# Patient Record
Sex: FEMALE | Race: WHITE | NOT HISPANIC OR LATINO | Employment: OTHER | URBAN - METROPOLITAN AREA
[De-identification: names, ages, dates, MRNs, and addresses within clinical notes are randomized per-mention and may not be internally consistent; named-entity substitution may affect disease eponyms.]

---

## 2018-05-07 ENCOUNTER — HOSPITAL ENCOUNTER (EMERGENCY)
Facility: HOSPITAL | Age: 63
Discharge: HOME | End: 2018-05-07
Attending: EMERGENCY MEDICINE | Admitting: EMERGENCY MEDICINE
Payer: COMMERCIAL

## 2018-05-07 VITALS
RESPIRATION RATE: 18 BRPM | HEART RATE: 88 BPM | DIASTOLIC BLOOD PRESSURE: 83 MMHG | TEMPERATURE: 98.7 F | SYSTOLIC BLOOD PRESSURE: 166 MMHG | WEIGHT: 145 LBS | OXYGEN SATURATION: 98 %
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
## 2018-05-07 DIAGNOSIS — L03.119 CELLULITIS OF WRIST: ICD-10-CM

## 2018-05-07 DIAGNOSIS — S61.551A CAT BITE OF RIGHT WRIST, INITIAL ENCOUNTER: Primary | ICD-10-CM

## 2018-05-07 LAB
ANION GAP SERPL CALCULATED.3IONS-SCNC: 9 MMOL/L (ref 4–13)
BASOPHILS # BLD AUTO: 0 THOUSANDS/ΜL (ref 0–0.1)
BASOPHILS NFR BLD AUTO: 1 % (ref 0–1)
BUN SERPL-MCNC: 15 MG/DL (ref 5–25)
CALCIUM SERPL-MCNC: 9.1 MG/DL (ref 8.3–10.1)
CHLORIDE SERPL-SCNC: 103 MMOL/L (ref 100–108)
CO2 SERPL-SCNC: 28 MMOL/L (ref 21–32)
CREAT SERPL-MCNC: 0.8 MG/DL (ref 0.6–1.3)
EOSINOPHIL # BLD AUTO: 0.2 THOUSAND/ΜL (ref 0–0.61)
EOSINOPHIL NFR BLD AUTO: 3 % (ref 0–6)
ERYTHROCYTE [DISTWIDTH] IN BLOOD BY AUTOMATED COUNT: 13 % (ref 11.6–15.1)
GFR SERPL CREATININE-BSD FRML MDRD: 79 ML/MIN/1.73SQ M
GLUCOSE SERPL-MCNC: 112 MG/DL (ref 65–140)
HCT VFR BLD AUTO: 41.6 % (ref 37–47)
HGB BLD-MCNC: 14.2 G/DL (ref 12–16)
LYMPHOCYTES # BLD AUTO: 1.3 THOUSANDS/ΜL (ref 0.6–4.47)
LYMPHOCYTES NFR BLD AUTO: 22 % (ref 14–44)
MCH RBC QN AUTO: 31 PG (ref 27–31)
MCHC RBC AUTO-ENTMCNC: 34 G/DL (ref 31.4–37.4)
MCV RBC AUTO: 91 FL (ref 82–98)
MONOCYTES # BLD AUTO: 0.3 THOUSAND/ΜL (ref 0.17–1.22)
MONOCYTES NFR BLD AUTO: 5 % (ref 4–12)
NEUTROPHILS # BLD AUTO: 4 THOUSANDS/ΜL (ref 1.85–7.62)
NEUTS SEG NFR BLD AUTO: 70 % (ref 43–75)
NRBC BLD AUTO-RTO: 0 /100 WBCS
PLATELET # BLD AUTO: 221 THOUSANDS/UL (ref 130–400)
PMV BLD AUTO: 7.4 FL (ref 8.9–12.7)
POTASSIUM SERPL-SCNC: 4.1 MMOL/L (ref 3.5–5.3)
RBC # BLD AUTO: 4.57 MILLION/UL (ref 4.2–5.4)
SODIUM SERPL-SCNC: 140 MMOL/L (ref 136–145)
WBC # BLD AUTO: 5.8 THOUSAND/UL (ref 4.8–10.8)

## 2018-05-07 PROCEDURE — 85025 COMPLETE CBC W/AUTO DIFF WBC: CPT | Performed by: PHYSICIAN ASSISTANT

## 2018-05-07 PROCEDURE — 96375 TX/PRO/DX INJ NEW DRUG ADDON: CPT

## 2018-05-07 PROCEDURE — 87077 CULTURE AEROBIC IDENTIFY: CPT | Performed by: PHYSICIAN ASSISTANT

## 2018-05-07 PROCEDURE — 87205 SMEAR GRAM STAIN: CPT | Performed by: PHYSICIAN ASSISTANT

## 2018-05-07 PROCEDURE — 36415 COLL VENOUS BLD VENIPUNCTURE: CPT | Performed by: PHYSICIAN ASSISTANT

## 2018-05-07 PROCEDURE — 96365 THER/PROPH/DIAG IV INF INIT: CPT

## 2018-05-07 PROCEDURE — 99283 EMERGENCY DEPT VISIT LOW MDM: CPT

## 2018-05-07 PROCEDURE — 80048 BASIC METABOLIC PNL TOTAL CA: CPT | Performed by: PHYSICIAN ASSISTANT

## 2018-05-07 PROCEDURE — 87070 CULTURE OTHR SPECIMN AEROBIC: CPT | Performed by: PHYSICIAN ASSISTANT

## 2018-05-07 PROCEDURE — 87040 BLOOD CULTURE FOR BACTERIA: CPT | Performed by: PHYSICIAN ASSISTANT

## 2018-05-07 RX ORDER — KETOROLAC TROMETHAMINE 30 MG/ML
15 INJECTION, SOLUTION INTRAMUSCULAR; INTRAVENOUS ONCE
Status: COMPLETED | OUTPATIENT
Start: 2018-05-07 | End: 2018-05-07

## 2018-05-07 RX ORDER — NAPROXEN 500 MG/1
500 TABLET ORAL 2 TIMES DAILY WITH MEALS
Qty: 20 TABLET | Refills: 0 | Status: SHIPPED | OUTPATIENT
Start: 2018-05-07 | End: 2018-07-25

## 2018-05-07 RX ORDER — AMOXICILLIN AND CLAVULANATE POTASSIUM 875; 125 MG/1; MG/1
1 TABLET, FILM COATED ORAL EVERY 12 HOURS SCHEDULED
Qty: 20 TABLET | Refills: 0 | Status: SHIPPED | OUTPATIENT
Start: 2018-05-07 | End: 2018-05-17

## 2018-05-07 RX ADMIN — SODIUM CHLORIDE 3 G: 9 INJECTION, SOLUTION INTRAVENOUS at 09:51

## 2018-05-07 RX ADMIN — KETOROLAC TROMETHAMINE 15 MG: 30 INJECTION, SOLUTION INTRAMUSCULAR at 10:42

## 2018-05-07 NOTE — ED PROVIDER NOTES
History  Chief Complaint   Patient presents with    Cat Bite     was bit by her cat yesterday  right wrist is reddened , warm and painful     80-year-old female presenting today with a cat bite that occurred last evening and woke this morning with some redness  States that her own cat bit her on her right wrist, she noticed this 2 puncture wounds  States that she thoroughly cleaned out the wound with peroxide  Has had this issue before in the past for she needed antibiotics  Last tetanus vaccination was within 3 years ago  States that she has her own rabies vaccinations and the cat is up-to-date on vaccinations  States that she takes care lesly cats on a regular basis  Notes redness and warmth to the back of her wrist  Took a dose of her cats 500 mg of amoxicillin medication  Denies numbness, paresthesias, fevers  None       Past Medical History:   Diagnosis Date    Hypertension        Past Surgical History:   Procedure Laterality Date    BREAST LUMPECTOMY Right     TONSILLECTOMY         History reviewed  No pertinent family history  I have reviewed and agree with the history as documented  Social History   Substance Use Topics    Smoking status: Never Smoker    Smokeless tobacco: Never Used    Alcohol use 1 8 oz/week     3 Cans of beer per week        Review of Systems   Constitutional: Negative  Negative for activity change and appetite change  HENT: Negative  Eyes: Negative  Respiratory: Negative  Cardiovascular: Negative  Gastrointestinal: Negative  Genitourinary: Negative  Musculoskeletal: Negative  Skin: Positive for color change and wound  Negative for pallor and rash  Neurological: Negative  All other systems reviewed and are negative        Physical Exam  ED Triage Vitals [05/07/18 0905]   Temperature Pulse Respirations Blood Pressure SpO2   98 7 °F (37 1 °C) 88 18 166/83 98 %      Temp src Heart Rate Source Patient Position - Orthostatic VS BP Location FiO2 (%)   -- -- -- -- --      Pain Score       6           Orthostatic Vital Signs  Vitals:    05/07/18 0905   BP: 166/83   Pulse: 88       Physical Exam   Constitutional: She is oriented to person, place, and time  She appears well-developed and well-nourished  HENT:   Head: Normocephalic and atraumatic  Eyes: Conjunctivae and EOM are normal  Pupils are equal, round, and reactive to light  Neck: Normal range of motion  Neck supple  Cardiovascular: Normal rate  Pulmonary/Chest: Effort normal    spo2 is 98% indicating adequate oxygenation    Neurological: She is alert and oriented to person, place, and time  Skin: Skin is warm and dry  Capillary refill takes less than 2 seconds  Rash noted  There is erythema  Nursing note and vitals reviewed  ED Medications  Medications   ketorolac (TORADOL) injection 15 mg (not administered)   ampicillin-sulbactam (UNASYN) 3 g in sodium chloride 0 9 % 100 mL IVPB (3 g Intravenous New Bag 5/7/18 0951)       Diagnostic Studies  Results Reviewed     Procedure Component Value Units Date/Time    Basic metabolic panel [[id number]] Collected:  05/07/18 2495    Lab Status:  Final result Specimen:  Blood from Arm, Left Updated:  05/07/18 1001     Sodium 140 mmol/L      Potassium 4 1 mmol/L      Chloride 103 mmol/L      CO2 28 mmol/L      Anion Gap 9 mmol/L      BUN 15 mg/dL      Creatinine 0 80 mg/dL      Glucose 112 mg/dL      Calcium 9 1 mg/dL      eGFR 79 ml/min/1 73sq m     Narrative:         National Kidney Disease Education Program recommendations are as follows:  GFR calculation is accurate only with a steady state creatinine  Chronic Kidney disease less than 60 ml/min/1 73 sq  meters  Kidney failure less than 15 ml/min/1 73 sq  meters      CBC and differential [[id number]]  (Abnormal) Collected:  05/07/18 0938    Lab Status:  Final result Specimen:  Blood from Arm, Left Updated:  05/07/18 0954     WBC 5 80 Thousand/uL      RBC 4 57 Million/uL Hemoglobin 14 2 g/dL      Hematocrit 41 6 %      MCV 91 fL      MCH 31 0 pg      MCHC 34 0 g/dL      RDW 13 0 %      MPV 7 4 (L) fL      Platelets 326 Thousands/uL      nRBC 0 /100 WBCs      Neutrophils Relative 70 %      Lymphocytes Relative 22 %      Monocytes Relative 5 %      Eosinophils Relative 3 %      Basophils Relative 1 %      Neutrophils Absolute 4 00 Thousands/µL      Lymphocytes Absolute 1 30 Thousands/µL      Monocytes Absolute 0 30 Thousand/µL      Eosinophils Absolute 0 20 Thousand/µL      Basophils Absolute 0 00 Thousands/µL     Blood culture #1 [[id number]] Collected:  05/07/18 0938    Lab Status: In process Specimen:  Blood from Arm, Left Updated:  05/07/18 0949    Blood culture #2 [[id number]] Collected:  05/07/18 0943    Lab Status: In process Specimen:  Blood from Arm, Right Updated:  05/07/18 0949    Wound culture and Gram stain [[id number]] Collected:  05/07/18 6371    Lab Status: In process Specimen:  Wound from Hand, Right Updated:  05/07/18 0948                 No orders to display              Procedures  Procedures       Phone Contacts  ED Phone Contact    ED Course                               MDM  Number of Diagnoses or Management Options  Cat bite of right wrist, initial encounter:   Cellulitis of wrist:   Diagnosis management comments: Encouraged patient to not use her cats medication for personal use and to air out wound  Will prescribe augmentin  No collection noted under the cellulitis however minimal pus expressed  Area demarcated and patient was given very strict return precautions for worsening of symptoms such as fevers, spreading redness, pus like drainage  Patient is informed to return to the emergency department for worsening of symptoms and was given proper education regarding their diagnosis and symptoms  Otherwise the patient is informed to follow up with their primary care doctor for re-evaluation   The patient verbalizes understanding and agrees with above assessment and plan  All questions were answered  Please Note: Fluency Direct voice recognition software may have been used in the creation of this document  Wrong words or sound a like substitutions may have occurred due to the inherent limitations of the voice software  Amount and/or Complexity of Data Reviewed  Clinical lab tests: ordered and reviewed  Review and summarize past medical records: yes  Independent visualization of images, tracings, or specimens: yes      CritCare Time    Disposition  Final diagnoses:   Cat bite of right wrist, initial encounter   Cellulitis of wrist     Time reflects when diagnosis was documented in both MDM as applicable and the Disposition within this note     Time User Action Codes Description Comment    5/7/2018 10:25 AM Vicky Padron [U07 731L,  W55 [id number]] Cat bite of right wrist, initial encounter     5/7/2018 10:25 AM Vicky Padron [[id number]] Cellulitis of wrist       ED Disposition     ED Disposition Condition Comment    Discharge  Bob Ache discharge to home/self care      Condition at discharge: Good        Follow-up Information     Follow up With Specialties Details Why Contact Info Additional [address] Emergency Department Emergency Medicine Go to If symptoms worsen such as spreading redness, swelling, fevers, severe pain etc  [address] [place] [address]  [phone] [facility], [place], [place], [address]    Judd Ramires MD Family Medicine Schedule an appointment as soon as possible for a visit As needed [address]  [id number] [facility] [address] [phone]           Patient's Medications   Discharge Prescriptions    AMOXICILLIN-CLAVULANATE (AUGMENTIN) 875-125 MG PER TABLET    Take 1 tablet by mouth every 12 (twelve) hours for 10 days       Start Date: 5/7/2018  End Date: 5/17/2018       Order Dose: 1 tablet       Quantity: 20 tablet    Refills: 0    NAPROXEN (NAPROSYN) 500 MG TABLET    Take 1 tablet (500 mg total) by mouth 2 (two) times a day with meals for 10 days       Start Date: 5/7/2018  End Date: 5/17/2018       Order Dose: 500 mg       Quantity: 20 tablet    Refills: 0     No discharge procedures on file      ED Provider  Electronically Signed by           Ke Adrian PA-C  05/07/18 1026

## 2018-05-07 NOTE — DISCHARGE INSTRUCTIONS
Animal Bite   WHAT YOU NEED TO KNOW:   Animal bite injuries range from shallow cuts to deep, life-threatening wounds  An animal can cut or puncture the skin when it bites  Your skin may be torn from your body  Your skin may swell or bruise even if the bite does not break the skin  Animal bites occur more often on the hands, arms, legs, and face  Bites from dogs and cats are the most common injuries  DISCHARGE INSTRUCTIONS:   Return to the emergency department if:   · You have a fever  · Your wound is red, swollen, and draining pus  · You see red streaks on the skin around the wound  · You can no longer move the bitten area  · Your heartbeat and breathing are much faster than usual     · You feel dizzy and confused  Contact your healthcare provider if:   · Your pain does not get better, even after you take pain medicine  · You have nightmares or flashbacks about the animal bite  · You have questions or concerns about your condition or care  Medicines: You may need any of the following:  · Antibiotics  prevent or treat a bacterial infection  · Prescription pain medicine  may be given  Ask how to take this medicine safely  · A tetanus vaccine  may be needed to prevent tetanus  Tetanus is a life-threatening bacterial infection that affects the nerves and muscles  The bacteria can be spread through animal bites  · A rabies vaccine  may be needed to prevent rabies  Rabies is a life-threatening viral infection  The virus can be spread through animal bites  · Take your medicine as directed  Contact your healthcare provider if you think your medicine is not helping or if you have side effects  Tell him of her if you are allergic to any medicine  Keep a list of the medicines, vitamins, and herbs you take  Include the amounts, and when and why you take them  Bring the list or the pill bottles to follow-up visits  Carry your medicine list with you in case of an emergency    Follow up with your healthcare provider in 1 to 2 days: You may need to return to have your stitches removed  Write down your questions so you remember to ask them during your visits  Self-care:   · Apply antibiotic ointment as directed  This helps prevent infection in minor skin wounds  It is available without a doctor's order  · Keep the wound clean and covered  Wash the wound every day with soap and water or germ-killing cleanser  Ask your healthcare provider about the kinds of bandages to use  · Apply ice on your wound  Ice helps decrease swelling and pain  Ice may also help prevent tissue damage  Use an ice pack, or put crushed ice in a plastic bag  Cover it with a towel and place it on your wound for 15 to 20 minutes every hour or as directed  · Elevate the wound area  Raise your wound above the level of your heart as often as you can  This will help decrease swelling and pain  Prop your wound on pillows or blankets to keep it elevated comfortably  Prevent another animal bite:   · Learn to recognize the signs of a scared or angry pet  Avoid quick, sudden movements  · Do not step between animals that are fighting  · Do not leave a pet alone with a young child  · Do not disturb an animal while it eats, sleeps, or cares for its young  · Do not approach an animal you do not know, especially one that is tied up or caged  · Stay away from animals that seem sick or act strangely  · Do not feed or capture wild animals  © 2017 [address] Information is for End User's use only and may not be sold, redistributed or otherwise used for commercial purposes  All illustrations and images included in CareNotes® are the copyrighted property of [employer]  or Tremayne Pacheco  The above information is an  only  It is not intended as medical advice for individual conditions or treatments   Talk to your doctor, nurse or pharmacist before following any medical regimen to see if it is safe and effective for you  Cellulitis, Ambulatory Care   GENERAL INFORMATION:   Cellulitis  is a skin infection caused by bacteria  Common symptoms include the following:   · Fever    · A red, warm, swollen area on your skin    · Pain when the area is touched    · Bumps or blisters (abscess) that may drain pus    · Bumpy, raised skin that feels like an orange peel  Seek immediate care for the following symptoms:   · An increase in pain, redness, warmth, and size    · Red streaks coming from the infected area    · A thin, gray-brown discharge coming from your infected skin area    · A crackling under your skin when you touch it    · Purple dots or bumps on your skin, or bleeding under your skin    · New swelling and pain in your legs    · Sudden trouble breathing or chest pain  Treatment for cellulitis  may include medicines to treat the bacterial infection or decrease pain  The infection may need to be cleaned out  Damaged, dead, or infected tissue may need to be cut away to help your wound heal   Manage your symptoms:   · Elevate your wound above the level of your heart  as often as you can  This will help decrease swelling and pain  Prop your wound on pillows or blankets to keep it elevated comfortably  · Clean your wound as directed  You may need to wash the wound with soap and water  Look for signs of infection  · Wear pressure stockings as directed  The stockings are tight and put pressure on your legs  This improves blood flow and decreases swelling  Prevent cellulitis:   · Wash your hands often  Use soap and water  Wash your hands after you use the bathroom, change a child's diapers, or sneeze  Wash your hands before you prepare or eat food  Use lotion to prevent dry, cracked skin  · Do not share personal items, such as towels, clothing, and razors  · Clean exercise equipment  with germ-killing  before and after you use it    Follow up with your healthcare provider as directed:  Write down your questions so you remember to ask them during your visits  CARE AGREEMENT:   You have the right to help plan your care  Learn about your health condition and how it may be treated  Discuss treatment options with your caregivers to decide what care you want to receive  You always have the right to refuse treatment  The above information is an  only  It is not intended as medical advice for individual conditions or treatments  Talk to your doctor, nurse or pharmacist before following any medical regimen to see if it is safe and effective for you  © 2014 [address] is for End User's use only and may not be sold, redistributed or otherwise used for commercial purposes  All illustrations and images included in CareNotes® are the copyrighted property of A D A M [employer]  or Tremayne Pacheco

## 2018-05-12 LAB
BACTERIA BLD CULT: NORMAL
BACTERIA BLD CULT: NORMAL
BACTERIA WND AEROBE CULT: ABNORMAL
BACTERIA WND AEROBE CULT: ABNORMAL
GRAM STN SPEC: ABNORMAL

## 2018-05-22 ENCOUNTER — TRANSCRIBE ORDERS (OUTPATIENT)
Dept: ADMINISTRATIVE | Facility: HOSPITAL | Age: 63
End: 2018-05-22

## 2018-05-22 DIAGNOSIS — Z13.820 SCREENING FOR OSTEOPOROSIS: Primary | ICD-10-CM

## 2018-05-30 ENCOUNTER — HOSPITAL ENCOUNTER (OUTPATIENT)
Dept: RADIOLOGY | Facility: HOSPITAL | Age: 63
Discharge: HOME | End: 2018-05-30
Payer: COMMERCIAL

## 2018-05-30 DIAGNOSIS — Z13.820 SCREENING FOR OSTEOPOROSIS: ICD-10-CM

## 2018-05-30 PROCEDURE — 77080 DXA BONE DENSITY AXIAL: CPT

## 2018-07-25 ENCOUNTER — HOSPITAL ENCOUNTER (EMERGENCY)
Facility: HOSPITAL | Age: 63
Discharge: HOME | End: 2018-07-25
Attending: EMERGENCY MEDICINE
Payer: COMMERCIAL

## 2018-07-25 VITALS
WEIGHT: 145 LBS | TEMPERATURE: 97.8 F | DIASTOLIC BLOOD PRESSURE: 87 MMHG | SYSTOLIC BLOOD PRESSURE: 166 MMHG | BODY MASS INDEX: 24.16 KG/M2 | HEART RATE: 92 BPM | OXYGEN SATURATION: 96 % | HEIGHT: 65 IN | RESPIRATION RATE: 18 BRPM

## 2018-07-25 DIAGNOSIS — T63.441A BEE STING: Primary | ICD-10-CM

## 2018-07-25 PROCEDURE — 99282 EMERGENCY DEPT VISIT SF MDM: CPT

## 2018-07-25 RX ORDER — CARVEDILOL 6.25 MG/1
6.25 TABLET ORAL 2 TIMES DAILY WITH MEALS
COMMUNITY
End: 2020-07-15

## 2018-07-25 RX ORDER — VALSARTAN 160 MG/1
160 TABLET ORAL DAILY
COMMUNITY
End: 2020-08-13

## 2018-07-25 RX ORDER — PREDNISONE 20 MG/1
TABLET ORAL
Qty: 15 TABLET | Refills: 0 | Status: SHIPPED | OUTPATIENT
Start: 2018-07-25 | End: 2020-07-30 | Stop reason: ALTCHOICE

## 2018-07-25 NOTE — ED PROVIDER NOTES
History  Chief Complaint   Patient presents with   Jen Jordan Sting     patient states she was stung last Wednesday (x5) and Thursday (x1) - states the symptoms initially improved, but yesterday she began with increased swelling and itching at the sting sites  Concerned because her left ring finger is swollen and she can't get her wedding ring off  also swelling to right hand, right leg  Patient is a 22-year-old female  She was stung by bees in multiple locations last week  Tetanus vaccination is up-to-date  Patient is complaining of persistent swelling to these locations  The swollen areas are very itchy  Mostly the left leg into the left hand  She has no generalized symptoms  No urticaria  No throat swelling or itching  No facial swelling  No difficulty breathing  No prior history of bee sting allergies  She is mostly concerned because she gets stung in her left hand and her left ring finger is fairly swollen  She does have a ring there  She has been unable to get off  Prior to Admission Medications   Prescriptions Last Dose Informant Patient Reported? Taking?   carvedilol (COREG) 6 25 mg tablet   Yes Yes   Sig: Take 6 25 mg by mouth 2 (two) times a day with meals   timolol (BETIMOL) 0 5 % ophthalmic solution   Yes Yes   Sig: Administer 1 drop to both eyes daily   valsartan (DIOVAN) 160 mg tablet   Yes Yes   Sig: Take 160 mg by mouth daily      Facility-Administered Medications: None       Past Medical History:   Diagnosis Date    Hypertension        Past Surgical History:   Procedure Laterality Date    BREAST LUMPECTOMY Right     TONSILLECTOMY         History reviewed  No pertinent family history  I have reviewed and agree with the history as documented      Social History   Substance Use Topics    Smoking status: Never Smoker    Smokeless tobacco: Never Used    Alcohol use 1 8 oz/week     3 Cans of beer per week        Review of Systems   Constitutional: Negative for chills and fever    Skin: Negative for pallor and rash  All other systems reviewed and are negative  Physical Exam  Physical Exam   Constitutional: She is oriented to person, place, and time  She appears well-developed and well-nourished  No distress  HENT:   Head: Normocephalic and atraumatic  Mouth/Throat: Oropharynx is clear and moist    Eyes: Conjunctivae are normal  Right eye exhibits no discharge  Left eye exhibits no discharge  Neck: Normal range of motion  Neck supple  Cardiovascular: Normal rate, regular rhythm, normal heart sounds and intact distal pulses  Exam reveals no gallop and no friction rub  No murmur heard  Pulmonary/Chest: Effort normal and breath sounds normal  No stridor  No respiratory distress  She has no wheezes  She has no rales  Abdominal: Soft  Bowel sounds are normal  She exhibits no distension  There is no tenderness  Musculoskeletal: Normal range of motion  She exhibits no deformity  There are several areas of redness and swelling to the left leg into the left hand  The left ring finger is fairly swollen  There is a tight ring at the base  Neurological: She is alert and oriented to person, place, and time  No motor or sensory deficits  Skin: Skin is warm and dry  Capillary refill takes less than 2 seconds  No rash noted  Psychiatric: She has a normal mood and affect  Her behavior is normal    Vitals reviewed        Vital Signs  ED Triage Vitals [07/25/18 0849]   Temperature Pulse Respirations Blood Pressure SpO2   97 8 °F (36 6 °C) 92 18 166/87 96 %      Temp Source Heart Rate Source Patient Position - Orthostatic VS BP Location FiO2 (%)   Tympanic Monitor Sitting Right arm --      Pain Score       --           Vitals:    07/25/18 0849   BP: 166/87   Pulse: 92   Patient Position - Orthostatic VS: Sitting       Visual Acuity      ED Medications  Medications - No data to display    Diagnostic Studies  Results Reviewed     None                 No orders to display Procedures  Procedures       Phone Contacts  ED Phone Contact    ED Course                               [facility]  Number of Diagnoses or Management Options  Bee sting:   Diagnosis management comments: These are local reactions  There is no generalized reaction  No anaphylaxis  There is no cellulitis  The should resolve spontaneously  Since it has been weak all likely had steroids  Recommended Benadryl for the itching  The ring was removed by nursing  Appropriate for discharge and outpatient management    CritCare Time    Disposition  Final diagnoses:   Bee sting - Multiple     Time reflects when diagnosis was documented in both MDM as applicable and the Disposition within this note     Time User Action Codes Description Comment    7/25/2018  9:14 AM Maloma Bedoya Add [[id number]] Bee sting     7/25/2018  9:14 AM Maloma Bedoya Modify [R89 680A] Bee sting Multiple      ED Disposition     ED Disposition Condition Comment    Discharge  Onur Tabor discharge to home/self care  Condition at discharge: Good        Follow-up Information     Follow up With Specialties Details Why Contact Info    Bhavik Valente MD Family Medicine  As needed [address]  [address] [place] [address]  [phone]            Patient's Medications   Discharge Prescriptions    PREDNISONE 20 MG TABLET    60 mg p o  q day x5 days       Start Date: 7/25/2018 End Date: --       Order Dose: --       Quantity: 15 tablet    Refills: 0     No discharge procedures on file      ED Provider  Electronically Signed by           Princess Pineda MD  07/25/18 Balwinder Gardner MD  07/25/18 3439

## 2018-07-25 NOTE — ED NOTES
Ring on left 4th digit cut off and given to pt    Pt tolerated well     Gudelia Washington RN  07/25/18 0862

## 2018-07-25 NOTE — DISCHARGE INSTRUCTIONS
Insect Bite or Sting   WHAT YOU NEED TO KNOW:   Most insect bites and stings are not dangerous and go away without treatment  Your symptoms may be mild, or you may develop anaphylaxis  Anaphylaxis is a sudden, life-threatening reaction that needs immediate treatment  Common examples of insects that bite or sting are bees, ticks, mosquitoes, spiders, and ants  Insect bites or stings can lead to diseases such as malaria, West Nile virus, Lyme disease, or [place] Spotted Fever  DISCHARGE INSTRUCTIONS:   Call 911 for signs or symptoms of anaphylaxis,  such as trouble breathing, swelling in your mouth or throat, or wheezing  You may also have itching, a rash, hives, or feel like you are going to faint  Return to the emergency department if:   · You are stung on your tongue or in your throat  · A white area forms around the bite  · You are sweating badly or have body pain  · You think you were bitten or stung by a poisonous insect  Contact your healthcare provider if:   · You have a fever  · The area becomes red, warm, tender, and swollen beyond the area of the bite or sting  · You have questions or concerns about your condition or care  Medicines:   · Antihistamines  decrease itching and rash  · Epinephrine  is used to treat severe allergic reactions such as anaphylaxis  · Take your medicine as directed  Contact your healthcare provider if you think your medicine is not helping or if you have side effects  Tell him of her if you are allergic to any medicine  Keep a list of the medicines, vitamins, and herbs you take  Include the amounts, and when and why you take them  Bring the list or the pill bottles to follow-up visits  Carry your medicine list with you in case of an emergency  Steps to take for signs or symptoms of anaphylaxis:   · Immediately  give 1 shot of epinephrine only into the outer thigh muscle  · Leave the shot in place  as directed   Your healthcare provider may recommend you leave it in place for up to 10 seconds before you remove it  This helps make sure all of the epinephrine is delivered  · Call 911 and go to the emergency department,  even if the shot improved symptoms  Do not drive yourself  Bring the used epinephrine shot with you  Safety precautions to take if you are at risk for anaphylaxis:   · Keep 2 shots of epinephrine with you at all times  You may need a second shot, because epinephrine only works for about 20 minutes and symptoms may return  Your healthcare provider can show you and family members how to give the shot  Check the expiration date every month and replace it before it expires  · Create an action plan  Your healthcare provider can help you create a written plan that explains the allergy and an emergency plan to treat a reaction  The plan explains when to give a second epinephrine shot if symptoms return or do not improve after the first  Give copies of the action plan and emergency instructions to family members, work and school staff, and  providers  Show them how to give a shot of epinephrine  · Carry medical alert identification  Wear medical alert jewelry or carry a card that says you have an insect allergy  Ask your healthcare provider where to get these items  If an insect bites or stings you:   · Remove the stinger  Scrape the stinger out with your fingernail, edge of a credit card, or a knife blade  Do not squeeze the wound  Gently wash the area with soap and water  · Remove the tick  Ticks must be removed as soon as possible so you do not get diseases passed through tick bites  Ask your healthcare provider for more information on tick bites and how to remove ticks  Care for a bite or sting wound:   · Elevate the affected area  Prop the wound above the level of your heart, if possible  Elevate the area for 10 to 20 minutes each hour or as directed by your healthcare provider  · Use compresses    Soak a clean washcloth in cold water, wring it out, and put it on the bite or sting  Use the compress for 10 to 20 minutes each hour or as directed by your healthcare provider  After 24 to 48 hours, change to warm compresses  · Apply a paste  Add water to baking soda to make a thick paste  Put the paste on the area for 5 minutes  Rinse gently to remove the paste  Prevent another insect bite or sting:   · Do not wear bright-colored or flower-print clothing when you plan to spend time outdoors  Do not use hairspray, perfumes, or aftershave  · Do not leave food out  · Empty any standing water and wash container with soap and water every 2 days  · Put screens on all open windows and doors  · Put insect repellent that contains DEET on skin that is showing when you go outside  Put insect repellent at the top of your boots, bottom of pant legs, and sleeve cuffs  Wear long sleeves, pants, and shoes  · Use citronella candles outdoors to help keep mosquitoes away  Put a tick and flea collar on pets  Follow up with your healthcare provider as directed:  Write down your questions so you remember to ask them during your visits  © 2017 [address] [facility] Information is for End User's use only and may not be sold, redistributed or otherwise used for commercial purposes  All illustrations and images included in CareNotes® are the copyrighted property of [employer]  or Tremayne Pacheco  The above information is an  only  It is not intended as medical advice for individual conditions or treatments  Talk to your doctor, nurse or pharmacist before following any medical regimen to see if it is safe and effective for you        Over-the-counter Benadryl

## 2019-04-28 ENCOUNTER — APPOINTMENT (EMERGENCY)
Dept: RADIOLOGY | Facility: HOSPITAL | Age: 64
End: 2019-04-28
Payer: COMMERCIAL

## 2019-04-28 ENCOUNTER — HOSPITAL ENCOUNTER (EMERGENCY)
Facility: HOSPITAL | Age: 64
Discharge: HOME | End: 2019-04-28
Attending: EMERGENCY MEDICINE | Admitting: EMERGENCY MEDICINE
Payer: COMMERCIAL

## 2019-04-28 VITALS
DIASTOLIC BLOOD PRESSURE: 89 MMHG | SYSTOLIC BLOOD PRESSURE: 159 MMHG | OXYGEN SATURATION: 99 % | TEMPERATURE: 97.7 F | WEIGHT: 145 LBS | RESPIRATION RATE: 18 BRPM | BODY MASS INDEX: 24.5 KG/M2 | HEART RATE: 88 BPM

## 2019-04-28 DIAGNOSIS — S62.101A CLOSED FRACTURE OF RIGHT WRIST, INITIAL ENCOUNTER: Primary | ICD-10-CM

## 2019-04-28 PROCEDURE — 99283 EMERGENCY DEPT VISIT LOW MDM: CPT

## 2019-04-28 PROCEDURE — 73130 X-RAY EXAM OF HAND: CPT

## 2019-04-28 PROCEDURE — 73110 X-RAY EXAM OF WRIST: CPT

## 2019-04-28 RX ORDER — TRAMADOL HYDROCHLORIDE 50 MG/1
50 TABLET ORAL EVERY 8 HOURS PRN
Qty: 6 TABLET | Refills: 0 | Status: SHIPPED | OUTPATIENT
Start: 2019-04-28 | End: 2019-04-30

## 2019-04-28 RX ORDER — NAPROXEN 250 MG/1
250 TABLET ORAL ONCE
Status: DISCONTINUED | OUTPATIENT
Start: 2019-04-28 | End: 2019-04-28

## 2019-04-28 RX ORDER — NAPROXEN 500 MG/1
500 TABLET ORAL 2 TIMES DAILY WITH MEALS
Qty: 20 TABLET | Refills: 0 | Status: SHIPPED | OUTPATIENT
Start: 2019-04-28 | End: 2019-06-09

## 2019-04-28 RX ORDER — NAPROXEN 500 MG/1
500 TABLET ORAL ONCE
Status: COMPLETED | OUTPATIENT
Start: 2019-04-28 | End: 2019-04-28

## 2019-04-28 RX ADMIN — NAPROXEN 500 MG: 500 TABLET ORAL at 15:33

## 2019-05-02 ENCOUNTER — OFFICE VISIT (OUTPATIENT)
Dept: OBGYN CLINIC | Facility: CLINIC | Age: 64
End: 2019-05-02
Payer: COMMERCIAL

## 2019-05-02 VITALS
SYSTOLIC BLOOD PRESSURE: 128 MMHG | HEIGHT: 65 IN | BODY MASS INDEX: 24.16 KG/M2 | WEIGHT: 145 LBS | DIASTOLIC BLOOD PRESSURE: 84 MMHG | HEART RATE: 69 BPM

## 2019-05-02 DIAGNOSIS — S52.501A NONDISPLACED FRACTURE OF DISTAL END OF RIGHT RADIUS: Primary | ICD-10-CM

## 2019-05-02 PROCEDURE — 99203 OFFICE O/P NEW LOW 30 MIN: CPT | Performed by: ORTHOPAEDIC SURGERY

## 2019-05-02 PROCEDURE — 25600 CLTX DST RDL FX/EPHYS SEP WO: CPT | Performed by: ORTHOPAEDIC SURGERY

## 2019-05-03 ENCOUNTER — OFFICE VISIT (OUTPATIENT)
Dept: OBGYN CLINIC | Facility: CLINIC | Age: 64
End: 2019-05-03

## 2019-05-03 DIAGNOSIS — S52.501A NONDISPLACED FRACTURE OF DISTAL END OF RIGHT RADIUS: Primary | ICD-10-CM

## 2019-05-03 PROCEDURE — 99024 POSTOP FOLLOW-UP VISIT: CPT | Performed by: ORTHOPAEDIC SURGERY

## 2019-05-09 ENCOUNTER — OFFICE VISIT (OUTPATIENT)
Dept: OBGYN CLINIC | Facility: CLINIC | Age: 64
End: 2019-05-09

## 2019-05-09 ENCOUNTER — APPOINTMENT (OUTPATIENT)
Dept: RADIOLOGY | Facility: CLINIC | Age: 64
End: 2019-05-09
Payer: COMMERCIAL

## 2019-05-09 VITALS
HEART RATE: 69 BPM | BODY MASS INDEX: 24.75 KG/M2 | WEIGHT: 145 LBS | HEIGHT: 64 IN | SYSTOLIC BLOOD PRESSURE: 121 MMHG | DIASTOLIC BLOOD PRESSURE: 79 MMHG

## 2019-05-09 DIAGNOSIS — S52.501A NONDISPLACED FRACTURE OF DISTAL END OF RIGHT RADIUS: ICD-10-CM

## 2019-05-09 DIAGNOSIS — S52.501A NONDISPLACED FRACTURE OF DISTAL END OF RIGHT RADIUS: Primary | ICD-10-CM

## 2019-05-09 PROCEDURE — 99024 POSTOP FOLLOW-UP VISIT: CPT | Performed by: ORTHOPAEDIC SURGERY

## 2019-05-09 PROCEDURE — 73100 X-RAY EXAM OF WRIST: CPT

## 2019-05-30 ENCOUNTER — APPOINTMENT (OUTPATIENT)
Dept: RADIOLOGY | Facility: CLINIC | Age: 64
End: 2019-05-30
Payer: COMMERCIAL

## 2019-05-30 ENCOUNTER — OFFICE VISIT (OUTPATIENT)
Dept: OBGYN CLINIC | Facility: CLINIC | Age: 64
End: 2019-05-30

## 2019-05-30 VITALS
SYSTOLIC BLOOD PRESSURE: 135 MMHG | HEIGHT: 65 IN | WEIGHT: 145 LBS | DIASTOLIC BLOOD PRESSURE: 86 MMHG | BODY MASS INDEX: 24.16 KG/M2 | HEART RATE: 62 BPM

## 2019-05-30 DIAGNOSIS — S52.501A NONDISPLACED FRACTURE OF DISTAL END OF RIGHT RADIUS: Primary | ICD-10-CM

## 2019-05-30 DIAGNOSIS — S52.501A NONDISPLACED FRACTURE OF DISTAL END OF RIGHT RADIUS: ICD-10-CM

## 2019-05-30 PROCEDURE — 99024 POSTOP FOLLOW-UP VISIT: CPT | Performed by: ORTHOPAEDIC SURGERY

## 2019-05-30 PROCEDURE — 73100 X-RAY EXAM OF WRIST: CPT

## 2019-06-09 ENCOUNTER — APPOINTMENT (EMERGENCY)
Dept: RADIOLOGY | Facility: HOSPITAL | Age: 64
End: 2019-06-09
Payer: COMMERCIAL

## 2019-06-09 ENCOUNTER — HOSPITAL ENCOUNTER (EMERGENCY)
Facility: HOSPITAL | Age: 64
Discharge: HOME | End: 2019-06-09
Attending: EMERGENCY MEDICINE | Admitting: EMERGENCY MEDICINE
Payer: COMMERCIAL

## 2019-06-09 VITALS
RESPIRATION RATE: 18 BRPM | SYSTOLIC BLOOD PRESSURE: 163 MMHG | DIASTOLIC BLOOD PRESSURE: 82 MMHG | OXYGEN SATURATION: 98 % | TEMPERATURE: 97.2 F | HEART RATE: 94 BPM

## 2019-06-09 DIAGNOSIS — K57.92 DIVERTICULITIS: Primary | ICD-10-CM

## 2019-06-09 LAB
ALBUMIN SERPL BCP-MCNC: 3.5 G/DL (ref 3.5–5)
ALP SERPL-CCNC: 69 U/L (ref 46–116)
ALT SERPL W P-5'-P-CCNC: 13 U/L (ref 12–78)
ANION GAP SERPL CALCULATED.3IONS-SCNC: 7 MMOL/L (ref 4–13)
AST SERPL W P-5'-P-CCNC: 11 U/L (ref 5–45)
BACTERIA UR QL AUTO: ABNORMAL /HPF
BASOPHILS # BLD AUTO: 0.06 THOUSANDS/ΜL (ref 0–0.1)
BASOPHILS NFR BLD AUTO: 1 % (ref 0–1)
BILIRUB SERPL-MCNC: 0.8 MG/DL (ref 0.2–1)
BILIRUB UR QL STRIP: ABNORMAL
BUN SERPL-MCNC: 14 MG/DL (ref 5–25)
CALCIUM SERPL-MCNC: 8.6 MG/DL (ref 8.3–10.1)
CHLORIDE SERPL-SCNC: 103 MMOL/L (ref 100–108)
CLARITY UR: CLEAR
CO2 SERPL-SCNC: 29 MMOL/L (ref 21–32)
COLOR UR: YELLOW
CREAT SERPL-MCNC: 0.65 MG/DL (ref 0.6–1.3)
EOSINOPHIL # BLD AUTO: 0.11 THOUSAND/ΜL (ref 0–0.61)
EOSINOPHIL NFR BLD AUTO: 1 % (ref 0–6)
ERYTHROCYTE [DISTWIDTH] IN BLOOD BY AUTOMATED COUNT: 12.4 % (ref 11.6–15.1)
GFR SERPL CREATININE-BSD FRML MDRD: 95 ML/MIN/1.73SQ M
GLUCOSE SERPL-MCNC: 117 MG/DL (ref 65–140)
GLUCOSE UR STRIP-MCNC: NEGATIVE MG/DL
HCT VFR BLD AUTO: 40.7 % (ref 34.8–46.1)
HGB BLD-MCNC: 13.5 G/DL (ref 11.5–15.4)
HGB UR QL STRIP.AUTO: ABNORMAL
IMM GRANULOCYTES # BLD AUTO: 0.04 THOUSAND/UL (ref 0–0.2)
IMM GRANULOCYTES NFR BLD AUTO: 0 % (ref 0–2)
KETONES UR STRIP-MCNC: ABNORMAL MG/DL
LEUKOCYTE ESTERASE UR QL STRIP: NEGATIVE
LIPASE SERPL-CCNC: 58 U/L (ref 73–393)
LYMPHOCYTES # BLD AUTO: 1.18 THOUSANDS/ΜL (ref 0.6–4.47)
LYMPHOCYTES NFR BLD AUTO: 11 % (ref 14–44)
MCH RBC QN AUTO: 30.9 PG (ref 26.8–34.3)
MCHC RBC AUTO-ENTMCNC: 33.2 G/DL (ref 31.4–37.4)
MCV RBC AUTO: 93 FL (ref 82–98)
MONOCYTES # BLD AUTO: 0.82 THOUSAND/ΜL (ref 0.17–1.22)
MONOCYTES NFR BLD AUTO: 8 % (ref 4–12)
MUCOUS THREADS UR QL AUTO: ABNORMAL
NEUTROPHILS # BLD AUTO: 8.28 THOUSANDS/ΜL (ref 1.85–7.62)
NEUTS SEG NFR BLD AUTO: 79 % (ref 43–75)
NITRITE UR QL STRIP: NEGATIVE
NON-SQ EPI CELLS URNS QL MICRO: ABNORMAL /HPF
NRBC BLD AUTO-RTO: 0 /100 WBCS
PH UR STRIP.AUTO: 6 [PH]
PLATELET # BLD AUTO: 201 THOUSANDS/UL (ref 149–390)
PMV BLD AUTO: 9.1 FL (ref 8.9–12.7)
POTASSIUM SERPL-SCNC: 3.9 MMOL/L (ref 3.5–5.3)
PROT SERPL-MCNC: 7.6 G/DL (ref 6.4–8.2)
PROT UR STRIP-MCNC: NEGATIVE MG/DL
RBC # BLD AUTO: 4.37 MILLION/UL (ref 3.81–5.12)
RBC #/AREA URNS AUTO: ABNORMAL /HPF
SODIUM SERPL-SCNC: 139 MMOL/L (ref 136–145)
SP GR UR STRIP.AUTO: 1.02 (ref 1–1.03)
UROBILINOGEN UR QL STRIP.AUTO: 1 E.U./DL
WBC # BLD AUTO: 10.49 THOUSAND/UL (ref 4.31–10.16)
WBC #/AREA URNS AUTO: ABNORMAL /HPF

## 2019-06-09 PROCEDURE — 36415 COLL VENOUS BLD VENIPUNCTURE: CPT | Performed by: EMERGENCY MEDICINE

## 2019-06-09 PROCEDURE — 96361 HYDRATE IV INFUSION ADD-ON: CPT

## 2019-06-09 PROCEDURE — 99284 EMERGENCY DEPT VISIT MOD MDM: CPT

## 2019-06-09 PROCEDURE — 74177 CT ABD & PELVIS W/CONTRAST: CPT

## 2019-06-09 PROCEDURE — 85025 COMPLETE CBC W/AUTO DIFF WBC: CPT | Performed by: EMERGENCY MEDICINE

## 2019-06-09 PROCEDURE — 81001 URINALYSIS AUTO W/SCOPE: CPT | Performed by: EMERGENCY MEDICINE

## 2019-06-09 PROCEDURE — 80053 COMPREHEN METABOLIC PANEL: CPT | Performed by: EMERGENCY MEDICINE

## 2019-06-09 PROCEDURE — 83690 ASSAY OF LIPASE: CPT | Performed by: EMERGENCY MEDICINE

## 2019-06-09 PROCEDURE — 96374 THER/PROPH/DIAG INJ IV PUSH: CPT

## 2019-06-09 RX ORDER — KETOROLAC TROMETHAMINE 30 MG/ML
15 INJECTION, SOLUTION INTRAMUSCULAR; INTRAVENOUS ONCE
Status: COMPLETED | OUTPATIENT
Start: 2019-06-09 | End: 2019-06-09

## 2019-06-09 RX ORDER — CIPROFLOXACIN 500 MG/1
500 TABLET, FILM COATED ORAL ONCE
Status: COMPLETED | OUTPATIENT
Start: 2019-06-09 | End: 2019-06-09

## 2019-06-09 RX ORDER — TRAMADOL HYDROCHLORIDE 50 MG/1
50 TABLET ORAL EVERY 6 HOURS PRN
Qty: 20 TABLET | Refills: 0 | Status: SHIPPED | OUTPATIENT
Start: 2019-06-09 | End: 2019-06-19

## 2019-06-09 RX ORDER — METRONIDAZOLE 500 MG/1
500 TABLET ORAL EVERY 8 HOURS SCHEDULED
Qty: 21 TABLET | Refills: 0 | Status: SHIPPED | OUTPATIENT
Start: 2019-06-09 | End: 2019-06-16

## 2019-06-09 RX ORDER — CIPROFLOXACIN 500 MG/1
500 TABLET, FILM COATED ORAL 2 TIMES DAILY
Qty: 14 TABLET | Refills: 0 | Status: SHIPPED | OUTPATIENT
Start: 2019-06-09 | End: 2019-06-16

## 2019-06-09 RX ORDER — METRONIDAZOLE 500 MG/1
500 TABLET ORAL ONCE
Status: COMPLETED | OUTPATIENT
Start: 2019-06-09 | End: 2019-06-09

## 2019-06-09 RX ADMIN — METRONIDAZOLE 500 MG: 500 TABLET, FILM COATED ORAL at 14:49

## 2019-06-09 RX ADMIN — IOHEXOL 100 ML: 350 INJECTION, SOLUTION INTRAVENOUS at 14:34

## 2019-06-09 RX ADMIN — CIPROFLOXACIN HYDROCHLORIDE 500 MG: 500 TABLET, FILM COATED ORAL at 14:49

## 2019-06-09 RX ADMIN — KETOROLAC TROMETHAMINE 15 MG: 30 INJECTION, SOLUTION INTRAMUSCULAR at 13:59

## 2019-06-09 RX ADMIN — SODIUM CHLORIDE 1000 ML: 0.9 INJECTION, SOLUTION INTRAVENOUS at 13:57

## 2019-06-13 ENCOUNTER — APPOINTMENT (OUTPATIENT)
Dept: RADIOLOGY | Facility: CLINIC | Age: 64
End: 2019-06-13
Payer: COMMERCIAL

## 2019-06-13 ENCOUNTER — OFFICE VISIT (OUTPATIENT)
Dept: OBGYN CLINIC | Facility: CLINIC | Age: 64
End: 2019-06-13

## 2019-06-13 VITALS
HEIGHT: 65 IN | BODY MASS INDEX: 23.82 KG/M2 | DIASTOLIC BLOOD PRESSURE: 93 MMHG | HEART RATE: 68 BPM | WEIGHT: 143 LBS | SYSTOLIC BLOOD PRESSURE: 155 MMHG

## 2019-06-13 DIAGNOSIS — S52.501A NONDISPLACED FRACTURE OF DISTAL END OF RIGHT RADIUS: ICD-10-CM

## 2019-06-13 DIAGNOSIS — S52.501A NONDISPLACED FRACTURE OF DISTAL END OF RIGHT RADIUS: Primary | ICD-10-CM

## 2019-06-13 PROCEDURE — 73100 X-RAY EXAM OF WRIST: CPT

## 2019-06-13 PROCEDURE — 99024 POSTOP FOLLOW-UP VISIT: CPT | Performed by: ORTHOPAEDIC SURGERY

## 2019-06-18 ENCOUNTER — EVALUATION (OUTPATIENT)
Dept: PHYSICAL THERAPY | Facility: CLINIC | Age: 64
End: 2019-06-18
Payer: COMMERCIAL

## 2019-06-18 DIAGNOSIS — S52.501D CLOSED FRACTURE OF DISTAL END OF RIGHT RADIUS WITH ROUTINE HEALING, UNSPECIFIED FRACTURE MORPHOLOGY, SUBSEQUENT ENCOUNTER: Primary | ICD-10-CM

## 2019-06-18 DIAGNOSIS — S52.501A NONDISPLACED FRACTURE OF DISTAL END OF RIGHT RADIUS: ICD-10-CM

## 2019-06-18 PROCEDURE — 97140 MANUAL THERAPY 1/> REGIONS: CPT

## 2019-06-18 PROCEDURE — G8985 CARRY GOAL STATUS: HCPCS

## 2019-06-18 PROCEDURE — 97161 PT EVAL LOW COMPLEX 20 MIN: CPT

## 2019-06-18 PROCEDURE — G8984 CARRY CURRENT STATUS: HCPCS

## 2019-06-18 PROCEDURE — 97110 THERAPEUTIC EXERCISES: CPT

## 2019-06-20 ENCOUNTER — OFFICE VISIT (OUTPATIENT)
Dept: PHYSICAL THERAPY | Facility: CLINIC | Age: 64
End: 2019-06-20
Payer: COMMERCIAL

## 2019-06-20 DIAGNOSIS — S52.501D CLOSED FRACTURE OF DISTAL END OF RIGHT RADIUS WITH ROUTINE HEALING, UNSPECIFIED FRACTURE MORPHOLOGY, SUBSEQUENT ENCOUNTER: Primary | ICD-10-CM

## 2019-06-20 DIAGNOSIS — S52.501A NONDISPLACED FRACTURE OF DISTAL END OF RIGHT RADIUS: ICD-10-CM

## 2019-06-20 PROCEDURE — 97110 THERAPEUTIC EXERCISES: CPT

## 2019-06-20 PROCEDURE — 97140 MANUAL THERAPY 1/> REGIONS: CPT

## 2019-06-26 ENCOUNTER — OFFICE VISIT (OUTPATIENT)
Dept: PHYSICAL THERAPY | Facility: CLINIC | Age: 64
End: 2019-06-26
Payer: COMMERCIAL

## 2019-06-26 DIAGNOSIS — S52.501D CLOSED FRACTURE OF DISTAL END OF RIGHT RADIUS WITH ROUTINE HEALING, UNSPECIFIED FRACTURE MORPHOLOGY, SUBSEQUENT ENCOUNTER: Primary | ICD-10-CM

## 2019-06-26 DIAGNOSIS — S52.501A NONDISPLACED FRACTURE OF DISTAL END OF RIGHT RADIUS: ICD-10-CM

## 2019-06-26 PROCEDURE — 97110 THERAPEUTIC EXERCISES: CPT

## 2019-06-26 PROCEDURE — 97140 MANUAL THERAPY 1/> REGIONS: CPT

## 2019-06-28 ENCOUNTER — OFFICE VISIT (OUTPATIENT)
Dept: PHYSICAL THERAPY | Facility: CLINIC | Age: 64
End: 2019-06-28
Payer: COMMERCIAL

## 2019-06-28 DIAGNOSIS — S52.501A NONDISPLACED FRACTURE OF DISTAL END OF RIGHT RADIUS: ICD-10-CM

## 2019-06-28 DIAGNOSIS — S52.501D CLOSED FRACTURE OF DISTAL END OF RIGHT RADIUS WITH ROUTINE HEALING, UNSPECIFIED FRACTURE MORPHOLOGY, SUBSEQUENT ENCOUNTER: Primary | ICD-10-CM

## 2019-06-28 PROCEDURE — 97110 THERAPEUTIC EXERCISES: CPT

## 2019-06-28 PROCEDURE — 97140 MANUAL THERAPY 1/> REGIONS: CPT

## 2019-07-03 ENCOUNTER — OFFICE VISIT (OUTPATIENT)
Dept: PHYSICAL THERAPY | Facility: CLINIC | Age: 64
End: 2019-07-03
Payer: COMMERCIAL

## 2019-07-03 DIAGNOSIS — S52.501D CLOSED FRACTURE OF DISTAL END OF RIGHT RADIUS WITH ROUTINE HEALING, UNSPECIFIED FRACTURE MORPHOLOGY, SUBSEQUENT ENCOUNTER: Primary | ICD-10-CM

## 2019-07-03 DIAGNOSIS — S52.501A NONDISPLACED FRACTURE OF DISTAL END OF RIGHT RADIUS: ICD-10-CM

## 2019-07-03 PROCEDURE — 97140 MANUAL THERAPY 1/> REGIONS: CPT

## 2019-07-03 PROCEDURE — 97110 THERAPEUTIC EXERCISES: CPT

## 2019-07-03 NOTE — PROGRESS NOTES
Daily Note     Today's date: 7/3/2019  Patient name: Jaylyn Pretty  : 1955  MRN: [id number]  Referring provider: Deja Sharp DO  Dx:   Encounter Diagnosis     ICD-10-CM    1  Closed fracture of distal end of right radius with routine healing, unspecified fracture morphology, subsequent encounter S52 501D    2  Nondisplaced fracture of distal end of right radius S52 501A        Start Time: 1500  Stop Time: 1555  Total time in clinic (min): 55 minutes    Subjective: Patient reports no changes since last visit       Objective: See treatment diary below      Precautions:   Hypertension, diverticulitis      Manual  6/18/19 6/20/19 6/26/19 6/28/19 7/3/19        STM forearm musculature, hand intrinsics forearm musculature, hand intrinsics forearm musculature, hand intrinsics forearm musculature, hand intrinsics forearm musculature, hand intrinsics        Retrograde massage fingers, wrist, forearm fingers, wrist, forearm fingers, wrist, forearm fingers, wrist, forearm fingers, wrist, forearm        IASTM Hand intrinsics, digits Hand intrinsics, digits Hand intrinsics, digits Hand intrinsics, digits Hand intrinsics, digits        Joint Mobs  RC, MP, PIP, DIP Grade II-III RC, MP, PIP, DIP Grade II-III RC, MP, PIP, DIP Grade II-III RC, MP, PIP, DIP Grade II-III        Finger blocking  1 x 10 each digit( MP , PIP's DIP's)  1 x 10  thumb IP and MP                            Exercise Diary  6/18/19 6/20/19 6/26/19 6/28/19 7/3/19        Patient education Wrist anatomy, purpose of testing ,HEP, tubigrip            Wrist AROM Pain free all planes 2 x 10-HEP            Forearm AROM Pain free all planes 2 x 10-HEP            Towel scrunches  3'           Finger blocking 2 x 10 each joint-HEP            Tendon glides 2 x 10 each-HEP            Peg board  2x   2 boards        Hand exerciser  flexion/extension ROM  20x flexion/extension ROM with resistance  20x  flexion/extension ROM with resistance  20x Supination/pronation cone   20x           Wrist Eccentric Extension  15x  1# 20x  1# Red TB  2  X  10 2 x 10  2#        Wrist Eccentric Flexion  15x 1# 20x  1# Red TB  2  X  10 2 x 10  2#        Ball Roll flex/exten  20x   3" hold 20x   3" hold          Hammer supination/pronation  20x   Pain free 20x   Pain free 20x   Pain free 20x   Pain free        Rubber band finger intrinsics             Clip Board : Key pinch   1 board take off/put on  yellow,red,green 1 board take off/put on  yellow,red,green 1 board take off/put on  yellow,red,green        Wrist Rad / Ulnar deviation   1#  2  X  10 Red TB  2  X  10 2 x 10  2#        Putty grasp, roll & pinch   Yellow  3' Yellow  3' HEP        Putty pinch and pull   Yellow  3' Yellow  3' HEP        Hand gripper    Red  20x          Sup/Pro Ball  and put down   10x  20x          3 point pinch Clip Board   1 board put on/take off    Yellow, Red green 1 board put on/take off    Yellow, Red green         Rhythmic Stabilization    therabar red  20x  therabar red  20x         Putty Press    Yellow  3'         Digi Flex    Green 2 x 1 5         Finger web     3 x 10        Crate push and pull     20#  2"        Crate              Farmer's Carry     10# BL  4 laps                                       Modalities   6/20/19  6/26/19 6/28/19 7/3/19       MHP  6' pre tx- to R forearm skin intact pre and post  5' pre tx- to R forearm skin intact pre and post 5' pre tx- to R forearm skin intact pre and pos 5' pre tx- to R forearm skin intact pre and pos       CP      Post tx to R wrist 5' skin in tact pre and post                         Assessment: Tolerated treatment well  Continue to progress UE strengthening with no increase in pain  Plan: Continue per plan of care

## 2019-07-05 ENCOUNTER — OFFICE VISIT (OUTPATIENT)
Dept: PHYSICAL THERAPY | Facility: CLINIC | Age: 64
End: 2019-07-05
Payer: COMMERCIAL

## 2019-07-05 DIAGNOSIS — S52.501A NONDISPLACED FRACTURE OF DISTAL END OF RIGHT RADIUS: ICD-10-CM

## 2019-07-05 DIAGNOSIS — S52.501D CLOSED FRACTURE OF DISTAL END OF RIGHT RADIUS WITH ROUTINE HEALING, UNSPECIFIED FRACTURE MORPHOLOGY, SUBSEQUENT ENCOUNTER: Primary | ICD-10-CM

## 2019-07-05 PROCEDURE — 97110 THERAPEUTIC EXERCISES: CPT

## 2019-07-05 PROCEDURE — 97140 MANUAL THERAPY 1/> REGIONS: CPT

## 2019-07-05 NOTE — PROGRESS NOTES
Daily Note     Today's date: 2019  Patient name: Mckayla Pearl  : 1955  MRN: [id number]  Referring provider: Stephany Griffin DO  Dx:   Encounter Diagnosis     ICD-10-CM    1  Closed fracture of distal end of right radius with routine healing, unspecified fracture morphology, subsequent encounter S52 501D    2  Nondisplaced fracture of distal end of right radius S52 501A        Start Time: 1200  Stop Time: 1300  Total time in clinic (min): 60 minutes    Subjective: patient reports no changes since last visit        Objective: See treatment diary below      Precautions:   Hypertension, diverticulitis      Manual  6/18/19 6/20/19 6/26/19 6/28/19 7/3/19        STM forearm musculature, hand intrinsics forearm musculature, hand intrinsics forearm musculature, hand intrinsics forearm musculature, hand intrinsics forearm musculature, hand intrinsics        Retrograde massage fingers, wrist, forearm fingers, wrist, forearm fingers, wrist, forearm fingers, wrist, forearm fingers, wrist, forearm        IASTM Hand intrinsics, digits Hand intrinsics, digits Hand intrinsics, digits Hand intrinsics, digits Hand intrinsics, digits        Joint Mobs  RC, MP, PIP, DIP Grade II-III RC, MP, PIP, DIP Grade II-III RC, MP, PIP, DIP Grade II-III RC, MP, PIP, DIP Grade II-III        Finger blocking  1 x 10 each digit( MP , PIP's DIP's)  1 x 10  thumb IP and MP                            Exercise Diary  6/18/19 6/20/19 6/26/19 6/28/19 7/3/19 7/5/19       Patient education Wrist anatomy, purpose of testing ,HEP, tubigrip            Wrist AROM Pain free all planes 2 x 10-HEP            Forearm AROM Pain free all planes 2 x 10-HEP            Towel scrunches  3'           Finger blocking 2 x 10 each joint-HEP            Tendon glides 2 x 10 each-HEP            Peg board  2x   2 boards 2 boards       Hand exerciser  flexion/extension ROM  20x flexion/extension ROM with resistance  20x  flexion/extension ROM with resistance  20x Supination/pronation cone   20x           Wrist Eccentric Extension  15x  1# 20x  1# Red TB  2  X  10 2 x 10  2# 2 x 10  3#       Wrist Eccentric Flexion  15x 1# 20x  1# Red TB  2  X  10 2 x 10  2# 2 x 10  3#       Ball Roll flex/exten  20x   3" hold 20x   3" hold          Hammer supination/pronation  20x   Pain free 20x   Pain free 20x   Pain free 20x   Pain free 20x   Pain free       Rubber band finger intrinsics             Clip Board : Key pinch   1 board take off/put on  yellow,red,green 1 board take off/put on  yellow,red,green 1 board take off/put on  yellow,red,green 1 board take off/put on  yellow,red,green       Wrist Rad / Ulnar deviation   1#  2  X  10 Red TB  2  X  10 2 x 10  2# 2 x 10  3#       Putty grasp, roll & pinch   Yellow  3' Yellow  3' HEP        Putty pinch and pull   Yellow  3' Yellow  3' HEP        Hand gripper    Red  20x          Sup/Pro Ball  and put down   10x  20x          3 point pinch Clip Board   1 board put on/take off    Yellow, Red green 1 board put on/take off    Yellow, Red green  1 board put on/take off    Yellow, Red green       Rhythmic Stabilization    therabar red  20x  therabar red  20x         Putty Press    Yellow  3'         Digi Flex    Green 2 x 1 5         Finger web     3 x 10        Crate push and pull     20#  2" 20#  2"       Crate              Farmer's Carry     10# BL  4 laps  10# BL  4 laps                                      Modalities   6/20/19  6/26/19 6/28/19 7/3/19 7/5/19      MHP  6' pre tx- to R forearm skin intact pre and post  5' pre tx- to R forearm skin intact pre and post 5' pre tx- to R forearm skin intact pre and pos 5' pre tx- to R forearm skin intact pre and pos 5' pre tx- to R forearm skin intact pre and pos      CP      Post tx to R wrist 5' skin in tact pre and post  Post tx to R wrist 5' skin in tact pre and post                        Assessment: Tolerated treatment well   No increase in pain with progressions in UE strengthening exercises  Plan: Continue per plan of care

## 2019-07-08 ENCOUNTER — OFFICE VISIT (OUTPATIENT)
Dept: PHYSICAL THERAPY | Facility: CLINIC | Age: 64
End: 2019-07-08
Payer: COMMERCIAL

## 2019-07-08 DIAGNOSIS — S52.501A NONDISPLACED FRACTURE OF DISTAL END OF RIGHT RADIUS: Primary | ICD-10-CM

## 2019-07-08 DIAGNOSIS — S52.501D CLOSED FRACTURE OF DISTAL END OF RIGHT RADIUS WITH ROUTINE HEALING, UNSPECIFIED FRACTURE MORPHOLOGY, SUBSEQUENT ENCOUNTER: ICD-10-CM

## 2019-07-08 PROCEDURE — 97140 MANUAL THERAPY 1/> REGIONS: CPT

## 2019-07-08 NOTE — PROGRESS NOTES
Daily Note     Today's date: 2019  Patient name: Jaylyn Pretty  : 1955  MRN: [id number]  Referring provider: Deja Sharp DO  Dx:   Encounter Diagnosis     ICD-10-CM    1  Nondisplaced fracture of distal end of right radius S52 501A    2  Closed fracture of distal end of right radius with routine healing, unspecified fracture morphology, subsequent encounter S52 501D        Start Time: 1200  Stop Time: 1250  Total time in clinic (min): 50 minutes    Subjective: Patient reports that she has had soreness recently         Objective: See treatment diary below      Precautions:   Hypertension, diverticulitis      Manual  6/18/19 6/20/19 6/26/19 6/28/19 7/3/19 7/8/19       STM forearm musculature, hand intrinsics forearm musculature, hand intrinsics forearm musculature, hand intrinsics forearm musculature, hand intrinsics forearm musculature, hand intrinsics forearm musculature, hand intrinsics       Retrograde massage fingers, wrist, forearm fingers, wrist, forearm fingers, wrist, forearm fingers, wrist, forearm fingers, wrist, forearm fingers, wrist, forearm       IASTM Hand intrinsics, digits Hand intrinsics, digits Hand intrinsics, digits Hand intrinsics, digits Hand intrinsics, digits Hand intrinsics, digits       Joint Mobs  RC, MP, PIP, DIP Grade II-III RC, MP, PIP, DIP Grade II-III RC, MP, PIP, DIP Grade II-III RC, MP, PIP, DIP Grade II-III RC, MP, PIP, DIP Grade II-III       Finger blocking  1 x 10 each digit( MP , PIP's DIP's)  1 x 10  thumb IP and MP                            Exercise Diary  6/18/19 6/20/19 6/26/19 6/28/19 7/3/19 7/5/19 7/8/19      Patient education Wrist anatomy, purpose of testing ,HEP, tubigrip            Wrist AROM Pain free all planes 2 x 10-HEP            Forearm AROM Pain free all planes 2 x 10-HEP            Towel scrunches  3'           Finger blocking 2 x 10 each joint-HEP            Tendon glides 2 x 10 each-HEP            Peg board  2x   2 boards 2 boards Hand exerciser  flexion/extension ROM  20x flexion/extension ROM with resistance  20x  flexion/extension ROM with resistance  20x        Supination/pronation cone   20x           Wrist Eccentric Extension  15x  1# 20x  1# Red TB  2  X  10 2 x 10  2# 2 x 10  3#       Wrist Eccentric Flexion  15x 1# 20x  1# Red TB  2  X  10 2 x 10  2# 2 x 10  3#       Ball Roll flex/exten  20x   3" hold 20x   3" hold          Hammer supination/pronation  20x   Pain free 20x   Pain free 20x   Pain free 20x   Pain free 20x   Pain free       Rubber band finger intrinsics             Clip Board : Key pinch   1 board take off/put on  yellow,red,green 1 board take off/put on  yellow,red,green 1 board take off/put on  yellow,red,green 1 board take off/put on  yellow,red,green 2 board take off/put blue black & green      Wrist Rad / Ulnar deviation   1#  2  X  10 Red TB  2  X  10 2 x 10  2# 2 x 10  3#       Putty grasp, roll & pinch   Yellow  3' Yellow  3' HEP        Putty pinch and pull   Yellow  3' Yellow  3' HEP        Hand gripper    Red  20x          Sup/Pro Ball  and put down   10x  20x          3 point pinch Clip Board   1 board put on/take off    Yellow, Red green 1 board put on/take off    Yellow, Red green  1 board put on/take off    Yellow, Red green 2 board take off/put blue black & green      Rhythmic Stabilization    therabar red  20x  therabar red  20x         Putty Press    Yellow  3'         Digi Flex    Green 2 x 1 5         Finger web     3 x 10  3 x 10      Crate push and pull     20#  2" 20#  2" 20#  2'      Crate              Farmer's Carry     10# BL  4 laps  10# BL  4 laps  10# BL  4 laps       therabar         supination  Red                       Modalities   6/20/19  6/26/19 6/28/19 7/3/19 7/5/19 7/8/19     [facility]  6' pre tx- to R forearm skin intact pre and post  5' pre tx- to R forearm skin intact pre and post 5' pre tx- to R forearm skin intact pre and pos 5' pre tx- to R forearm skin intact pre and pos 5' pre tx- to R forearm skin intact pre and pos 5' pre tx- to R forearm skin intact pre and pos     CP      Post tx to R wrist 5' skin in tact pre and post  Post tx to R wrist 5' skin in tact pre and post                      Assessment: Tolerated treatment well  Patient would benefit from continued PT  Independently completed exercises today as she was doubled with another patient; required 5' of PT education for exercises  Plan: Progress note during next visit

## 2019-07-11 ENCOUNTER — APPOINTMENT (OUTPATIENT)
Dept: PHYSICAL THERAPY | Facility: CLINIC | Age: 64
End: 2019-07-11
Payer: COMMERCIAL

## 2019-07-12 ENCOUNTER — OFFICE VISIT (OUTPATIENT)
Dept: OBGYN CLINIC | Facility: CLINIC | Age: 64
End: 2019-07-12

## 2019-07-12 VITALS
SYSTOLIC BLOOD PRESSURE: 122 MMHG | BODY MASS INDEX: 23.66 KG/M2 | DIASTOLIC BLOOD PRESSURE: 79 MMHG | HEART RATE: 78 BPM | HEIGHT: 65 IN | WEIGHT: 142 LBS

## 2019-07-12 DIAGNOSIS — S52.501A NONDISPLACED FRACTURE OF DISTAL END OF RIGHT RADIUS: Primary | ICD-10-CM

## 2019-07-12 PROCEDURE — 99024 POSTOP FOLLOW-UP VISIT: CPT | Performed by: ORTHOPAEDIC SURGERY

## 2019-07-12 RX ORDER — NAPROXEN 250 MG/1
250 TABLET ORAL 2 TIMES DAILY WITH MEALS
COMMUNITY
End: 2020-07-30 | Stop reason: ALTCHOICE

## 2019-07-12 NOTE — PROGRESS NOTES
Assessment/Plan:  1  Nondisplaced fracture of distal end of right radius         Scribe Attestation    I,:   Radhika Ceja MA am acting as a scribe while in the presence of the attending physician :        I,:   Tom Capone, DO personally performed the services described in this documentation    as scribed in my presence :              Luz Elena Castellon is doing well  She is non tender over the fracture site on exam  She was instructed to discontinue with formal physical therapy and continue with HEP to work on range of motion  She has no restrictions at this time  She was instructed to discontinue the use of the cock-up wrist brace  She may take over the counter medication as needed for pain  She will follow up in 4 weeks for repeat evaluation  Subjective:   Shala Montalvo is a 61 y o  female who presents to the office today for follow up evaluation 10 weeks s/p closed treatment for a right distal radius fracture  Patient states she is doing well  Patient states she has been compliant with formal physical therapy  She notes some soreness after therapy as well as with supination  She denies any numbness or tingling  Review of Systems   Constitutional: Negative for chills and fever  HENT: Negative for drooling and sneezing  Eyes: Negative for redness  Respiratory: Negative for cough and wheezing  Gastrointestinal: Negative for nausea and vomiting  Musculoskeletal: Negative for arthralgias, joint swelling and myalgias  Neurological: Negative for weakness and numbness  Psychiatric/Behavioral: Negative for behavioral problems  The patient is not nervous/anxious            Past Medical History:   Diagnosis Date    Diverticulitis of colon     Hypertension        Past Surgical History:   Procedure Laterality Date    BREAST LUMPECTOMY Right     TONSILLECTOMY         Family History   Problem Relation Age of Onset    No Known Problems Mother     No Known Problems Father     No Known Problems Sister     No Known Problems Brother     No Known Problems Maternal Aunt     No Known Problems Maternal Uncle     No Known Problems Paternal Aunt     No Known Problems Paternal Uncle     No Known Problems Maternal Grandmother     No Known Problems Maternal Grandfather     No Known Problems Paternal Grandmother     No Known Problems Paternal Grandfather     ADD / ADHD Neg Hx     Anesthesia problems Neg Hx     Cancer Neg Hx     Clotting disorder Neg Hx     Collagen disease Neg Hx     Diabetes Neg Hx     Dislocations Neg Hx     Learning disabilities Neg Hx     Neurological problems Neg Hx     Osteoporosis Neg Hx     Rheumatologic disease Neg Hx     Scoliosis Neg Hx     Vascular Disease Neg Hx        Social History     Occupational History    Not on file   Tobacco Use    Smoking status: Never Smoker    Smokeless tobacco: Never Used   Substance and Sexual Activity    Alcohol use: Yes     Alcohol/week: 3 0 standard drinks     Types: 3 Cans of beer per week    Drug use: No    Sexual activity: Not on file         Current Outpatient Medications:     carvedilol (COREG) 6 25 mg tablet, Take 6 25 mg by mouth 2 (two) times a day with meals, Disp: , Rfl:     naproxen (NAPROSYN) 250 mg tablet, Take 250 mg by mouth 2 (two) times a day with meals, Disp: , Rfl:     predniSONE 20 mg tablet, 60 mg p o  q day x5 days, Disp: 15 tablet, Rfl: 0    timolol (BETIMOL) 0 5 % ophthalmic solution, Administer 1 drop to both eyes daily, Disp: , Rfl:     valsartan (DIOVAN) 160 mg tablet, Take 160 mg by mouth daily, Disp: , Rfl:     No Known Allergies    Objective: There were no vitals filed for this visit  Ortho Exam     Right wrist    80 supination  65 ext  55 flex  15 ulnar deviation  10 radial deviation   Compartments soft  NTTP fx site  Brisk capillary refill  Sensation intact median, radial, and ulnar nerve     Physical Exam   Constitutional: She is oriented to person, place, and time   She appears well-developed and well-nourished  HENT:   Head: Normocephalic and atraumatic  Eyes: Conjunctivae are normal  Right eye exhibits no discharge  Left eye exhibits no discharge  Neck: Normal range of motion  Neck supple  Cardiovascular: Normal rate and intact distal pulses  Pulmonary/Chest: Effort normal  No respiratory distress  Musculoskeletal:   As noted in HPI   Neurological: She is alert and oriented to person, place, and time  Skin: Skin is warm and dry  Psychiatric: She has a normal mood and affect   Her behavior is normal  Judgment and thought content normal

## 2019-07-16 ENCOUNTER — OFFICE VISIT (OUTPATIENT)
Dept: PHYSICAL THERAPY | Facility: CLINIC | Age: 64
End: 2019-07-16
Payer: COMMERCIAL

## 2019-07-16 DIAGNOSIS — S52.501A NONDISPLACED FRACTURE OF DISTAL END OF RIGHT RADIUS: Primary | ICD-10-CM

## 2019-07-16 DIAGNOSIS — S52.501D CLOSED FRACTURE OF DISTAL END OF RIGHT RADIUS WITH ROUTINE HEALING, UNSPECIFIED FRACTURE MORPHOLOGY, SUBSEQUENT ENCOUNTER: ICD-10-CM

## 2019-07-16 PROCEDURE — G8986 CARRY D/C STATUS: HCPCS

## 2019-07-16 PROCEDURE — 97110 THERAPEUTIC EXERCISES: CPT

## 2019-07-16 PROCEDURE — G8985 CARRY GOAL STATUS: HCPCS

## 2019-07-16 NOTE — PROGRESS NOTES
PT Evaluation     Today's date: 2019  Patient name: Jaylyn Pretty  : 1955  MRN: [id number]  Referring provider: Deja Sharp DO  Dx:   Encounter Diagnosis     ICD-10-CM    1  Nondisplaced fracture of distal end of right radius S52 501A    2  Closed fracture of distal end of right radius with routine healing, unspecified fracture morphology, subsequent encounter S52 501D        Start Time: 1200  Stop Time: 1245  Total time in clinic (min): 45 minutes    Assessment  Assessment details:  Patient presents today with non surgical  DRF which occurred on 19  Girth measurements indicate swelling present over wrist  AROM measurements indicate severe limitations in all planes  Strength testing on R UE held; /pinch measurements taken on L  Full composite fist and opposition BL  Moderate intrinsic tightness present in the R UE  Mild TTP present over the distal ulna  Patient was provided Tubi  to manage swelling in which she was thoroughly educated on wear time; to begin wearing for 1 hour a day and gradually increase wear time during the day  She was provided with HEP which included: wrist AROM, forearm AROM, intrinsic finger stretching, finger blocking, and tendon glides which were all completed pain free and demonstrated correctly  She verbalized understanding  Patient would benefit from skilled PT in order to reduce swelling, improve wrist/forearm AROM, begin /pinch strength once cleared by MD, improve composite fist, and reduce TTP so she can carry objects, complete ADL's and household chores, drive, sleep, and help take care of her elderly mother  19  After seeing MD last week, patient reports that she was cleared to discontinue formal PT  She would like to continue with comprehensive HEP which she was thoroughly educated on completing and verbalized understanding  Improvements achieved in wrist AROM to [facility] and non painful end ranges   Improvements in UE strength BL but still  strength limitations  She is nontender over the fracture site  As per patient request she will be DC from formal therapy today  Impairments: abnormal muscle tone, abnormal or restricted ROM, impaired physical strength, lacks appropriate home exercise program, pain with function and poor posture     Symptom irritability: lowUnderstanding of Dx/Px/POC: good   Prognosis: good    Goals  STG's (4-6 weeks)  1  Reduce TTP over fracture site to 0/10 80% of the time so she can complete household chores and 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 -met  2  Improve pain free AROM of R wrist  she can complete all ADLs and household chores as she lives alone -met  3  Improve  strength by 10 lbs to improve her ability to open jars and drive -met    LTGs (6-8 weeks )  1  Patient will improve  strength to WNL for woman in her age group (55 1  lb)  to safely ensure she can carry and  heavy food trays at work without risk of re-injury  - once cleared by MD protocol-not met  2  Patient will be able to open a jar pain free  - mostly met  3   Patient will be able to carry objects greater than 10lb and cut food with a knife symptom free  -mostly met    Plan  Plan details: DC to HEP   Patient would benefit from: skilled physical therapy and PT eval  Planned modality interventions: TENS, thermotherapy: hydrocollator packs and cryotherapy  Planned therapy interventions: joint mobilization, manual therapy, neuromuscular re-education, orthotic fitting/training, patient education, strengthening, therapeutic activities, stretching, fine motor coordination training, flexibility, therapeutic exercise, home exercise program, graded motor, functional ROM exercises and graded exercise  Frequency: 2x week  Duration in weeks: 8  Plan of Care beginning date: 6/11/2019  Plan of Care expiration date: 8/13/2019  Treatment plan discussed with: patient        Subjective Evaluation    History of Present Illness  Mechanism of injury: 19 patient was volunteering for town and [employer]  She was carrying a bag and slipped on a slope  She felt a snap and the next day she went to ER and did x rays  She saw Dr Sergei Neves on 19 and was put in a pre castro brace  Since then she has been DC from brace  She reports 2/10 pain today since she did not sleep with brace last night and she was pushing  Her mom in [facility]  Recently pain is at worst 2 5/10 if she tries to do too much with the hand  Pain located on the distal radius  No NT present  hx of carpal tunnel  19  Since beginning PT patient reports that she would like to be DC at this time  She saw MD who told her she can be DC from formal PT  She will continue with exercises at home  Pain is at worst if she turns her wrist up too quickly (supination) reaching 3/10  Pain  Current pain ratin  At best pain ratin  At worst pain rating: 3  Location: distal R radius   Quality: dull ache  Aggravating factors: lifting    Social Support  Steps to enter house: yes  Stairs in house: yes   Lives in: multiple-level home  Lives with: spouse    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 )  Hand dominance: left      Diagnostic Tests  X-ray: abnormal    FCE comments: X ray on 19:   Fracture healing wellTreatments  Current treatment: physical therapy  Patient Goals  Patient goals for therapy: increased strength, return to sport/leisure activities, independence with ADLs/IADLs, increased motion, decreased edema and decreased pain  Patient goal: help her mother get into/out of bed, computer work,  trapping animal rescue   Objective     Palpation     Additional Palpation Details  Non tender over distal radius on R, but mild TTP over distal ulna at styloid process      Active Range of Motion     Left Wrist   Wrist flexion: 60 degrees   Wrist extension: 65 degrees   Radial deviation: 20 degrees   Ulnar deviation: 35 degrees     Right Wrist   Wrist flexion: 60 degrees   Wrist extension: 60 degrees   Radial deviation: 20 degrees   Ulnar deviation: 30 degrees     Additional Active Range of Motion Details  Forearm AROM:  R Supination:75degrees  R Pronation: 85 degrees    L supination: 80 degrees   L Pronation: 75 degrees  Full composite fist BL    Opposition to each digit BL  Mild intrinsic tightness on the R      Passive Range of Motion     Left Wrist   Wrist flexion: 75 degrees   Wrist extension: 75 degrees   Radial deviation: 20 degrees   Ulnar deviation: 35 degrees     Right Wrist   Wrist flexion: 65 degrees   Wrist extension: 65 degrees     Strength/Myotome Testing     Left Wrist/Hand      (2nd hand position)     Trial 1: 60    Thumb Strength  Key/Lateral Pinch     [place] 1: 13  Palmar/Three-Point Pinch     Trial 1: 14    Right Wrist/Hand   Wrist extension: 4+  Wrist flexion: 4+     (2nd hand position)     Trial 1: 40    Thumb Strength   Key/Lateral Pinch     Trial 1: 15  Palmar/Three-Point Pinch     Trial 1: 10    Swelling     Left Wrist/Hand   Circumference MCP: 19 3 cm  Circumference wrist: 16 cm    Right Wrist/Hand   Circumference MCP: 19 3 cm  Circumference wrist: 16 cm      Flowsheet Rows      Most Recent Value   PT/OT G-Codes   Current Score  76   Projected Score  76   FOTO information reviewed  Yes   Assessment Type  Discharge   G code set  Carrying, Moving & Handling Objects   Carrying, Moving and Handling Objects Goal Status ()  CI   Carrying, Moving and Handling Objects Discharge Status ()  CI             Precautions:   Hypertension, diverticulitis      Precautions:   Hypertension, diverticulitis      Manual  6/18/19 6/20/19 6/26/19 6/28/19 7/3/19 7/8/19       STM forearm musculature, hand intrinsics forearm musculature, hand intrinsics forearm musculature, hand intrinsics forearm musculature, hand intrinsics forearm musculature, hand intrinsics forearm musculature, hand intrinsics       Retrograde massage fingers, wrist, forearm fingers, wrist, forearm fingers, wrist, forearm fingers, wrist, forearm fingers, wrist, forearm fingers, wrist, forearm       IASTM Hand intrinsics, digits Hand intrinsics, digits Hand intrinsics, digits Hand intrinsics, digits Hand intrinsics, digits Hand intrinsics, digits       Joint Mobs  RC, MP, PIP, DIP Grade II-III RC, MP, PIP, DIP Grade II-III RC, MP, PIP, DIP Grade II-III RC, MP, PIP, DIP Grade II-III RC, MP, PIP, DIP Grade II-III       Finger blocking  1 x 10 each digit( MP , PIP's DIP's)  1 x 10  thumb IP and MP                            Exercise Diary  6/18/19 6/20/19 6/26/19 6/28/19 7/3/19 7/5/19 7/8/19 7/16/19     Strength/ROM testing        20'     Patient education Wrist anatomy, purpose of testing ,HEP, tubigrip       HEP review,     Wrist AROM Pain free all planes 2 x 10-HEP            Forearm AROM Pain free all planes 2 x 10-HEP            Towel scrunches  3'           Finger blocking 2 x 10 each joint-HEP            Tendon glides 2 x 10 each-HEP            Peg board  2x   2 boards 2 boards       Hand exerciser  flexion/extension ROM  20x flexion/extension ROM with resistance  20x  flexion/extension ROM with resistance  20x        Supination/pronation cone   20x           Wrist Eccentric Extension  15x  1# 20x  1# Red TB  2  X  10 2 x 10  2# 2 x 10  3#  2 x 10  3#     Wrist Eccentric Flexion  15x 1# 20x  1# Red TB  2  X  10 2 x 10  2# 2 x 10  3#  2 x 10  3#     Ball Roll flex/exten  20x   3" hold 20x   3" hold          Hammer supination/pronation  20x   Pain free 20x   Pain free 20x   Pain free 20x   Pain free 20x   Pain free       Rubber band finger intrinsics             Clip Board : Key pinch   1 board take off/put on  yellow,red,green 1 board take off/put on  yellow,red,green 1 board take off/put on  yellow,red,green 1 board take off/put on  yellow,red,green 2 board take off/put blue black & green      Wrist Rad / Ulnar deviation   1#  2  X  10 Red TB  2  X  10 2 x 10  2# 2 x 10  3#  2 x 10  3# Putty grasp, roll & pinch   Yellow  3' Yellow  3' HEP        Putty pinch and pull   Yellow  3' Yellow  3' HEP        Hand gripper    Red  20x          Sup/Pro Ball  and put down   10x  20x          3 point pinch Clip Board   1 board put on/take off    Yellow, Red green 1 board put on/take off    Yellow, Red green  1 board put on/take off    Yellow, Red green 2 board take off/put blue black & green      Rhythmic Stabilization    therabar red  20x  therabar red  20x         Putty Press    Yellow  3'         Digi Flex    Green 2 x 1 5         Finger web     3 x 10  3 x 10      Crate push and pull     20#  2" 20#  2" 20#  2' 20#  3'     Crate              Farmer's Carry     10# BL  4 laps  10# BL  4 laps  10# BL  4 laps  15# BL  4 laps      therabar         supination  Red      Bicep curl        5# each   3  X 10     Tricep extension        10#  marla  3 x 10          Modalities   6/20/19  6/26/19 6/28/19 7/3/19 7/5/19 7/8/19     MHP  6' pre tx- to R forearm skin intact pre and post  5' pre tx- to R forearm skin intact pre and post 5' pre tx- to R forearm skin intact pre and pos 5' pre tx- to R forearm skin intact pre and pos 5' pre tx- to R forearm skin intact pre and pos 5' pre tx- to R forearm skin intact pre and pos     CP      Post tx to R wrist 5' skin in tact pre and post  Post tx to R wrist 5' skin in tact pre and post

## 2020-06-15 LAB — HCV AB SER-ACNC: NORMAL

## 2020-07-15 DIAGNOSIS — I10 ESSENTIAL HYPERTENSION: Primary | ICD-10-CM

## 2020-07-15 RX ORDER — CARVEDILOL 6.25 MG/1
TABLET ORAL
Qty: 60 TABLET | Refills: 3 | Status: SHIPPED | OUTPATIENT
Start: 2020-07-15 | End: 2020-11-04

## 2020-07-30 ENCOUNTER — TELEMEDICINE (OUTPATIENT)
Dept: FAMILY MEDICINE CLINIC | Facility: CLINIC | Age: 65
End: 2020-07-30
Payer: COMMERCIAL

## 2020-07-30 VITALS
RESPIRATION RATE: 16 BRPM | TEMPERATURE: 98.1 F | WEIGHT: 148 LBS | BODY MASS INDEX: 24.66 KG/M2 | HEIGHT: 65 IN | HEART RATE: 98 BPM | DIASTOLIC BLOOD PRESSURE: 70 MMHG | OXYGEN SATURATION: 98 % | SYSTOLIC BLOOD PRESSURE: 110 MMHG

## 2020-07-30 DIAGNOSIS — R59.0 ENLARGED LYMPH NODE IN NECK: ICD-10-CM

## 2020-07-30 DIAGNOSIS — H40.89 OTHER GLAUCOMA OF BOTH EYES: ICD-10-CM

## 2020-07-30 DIAGNOSIS — I10 ESSENTIAL HYPERTENSION: Primary | ICD-10-CM

## 2020-07-30 DIAGNOSIS — M72.2 PLANTAR FASCIITIS, BILATERAL: ICD-10-CM

## 2020-07-30 PROCEDURE — 99214 OFFICE O/P EST MOD 30 MIN: CPT | Performed by: FAMILY MEDICINE

## 2020-07-30 PROCEDURE — 1036F TOBACCO NON-USER: CPT | Performed by: FAMILY MEDICINE

## 2020-07-30 PROCEDURE — 3008F BODY MASS INDEX DOCD: CPT | Performed by: FAMILY MEDICINE

## 2020-07-30 NOTE — PROGRESS NOTES
Assessment/Plan:         Problem List Items Addressed This Visit        Cardiovascular and Mediastinum    Essential hypertension - Primary     Well controlled              Musculoskeletal and Integument    Plantar fasciitis, bilateral     Conservative manageme t no flat shoes ice prn if no improvement 1 month to podiatry             Immune and Lymphatic    Enlarged lymph node in neck     Right upper cervical by history not  Enlarged at present likely on off reactive call if gets large and persists for more than 2 weeks             Other    Other specified glaucoma     On drops                    pt with right and left pain for 1 month worse in am  Pt wears flat shoes also has "gland "  Right neck  Comes and goes non tender     Patient ID: Maryanne Dia is a 59 y o  female  HPI    The following portions of the patient's history were reviewed and updated as appropriate:   She has a past medical history of Diverticulitis of colon and Hypertension  ,  does not have any pertinent problems on file  ,   has a past surgical history that includes Tonsillectomy; Breast lumpectomy (Right); Colonoscopy (10/11/2016); and Mammo (historical) (12/14/2018,11/10/17)  ,  family history includes Hyperlipidemia in her mother; Hypertension in her mother; No Known Problems in her brother, father, maternal aunt, maternal grandfather, maternal grandmother, maternal uncle, paternal aunt, paternal grandfather, paternal grandmother, paternal uncle, and sister; Other in her mother; Stroke in her mother  ,   reports that she has never smoked  She has never used smokeless tobacco  She reports that she drinks about 3 0 standard drinks of alcohol per week  She reports that she does not use drugs  ,  has No Known Allergies     Current Outpatient Medications   Medication Sig Dispense Refill    carvedilol (COREG) 6 25 mg tablet take 1 tablet by mouth twice a day 60 tablet 3    timolol (BETIMOL) 0 5 % ophthalmic solution Administer 1 drop to both eyes daily      valsartan (DIOVAN) 160 mg tablet Take 160 mg by mouth daily       No current facility-administered medications for this visit  Review of Systems   HENT:        "swollen gland right neck"   Respiratory: Negative for cough, chest tightness and shortness of breath  Cardiovascular: Negative for chest pain, palpitations and leg swelling  Musculoskeletal:        Right and left heel to arch pain   Neurological: Negative for dizziness, weakness, light-headedness and headaches  Psychiatric/Behavioral: Negative for dysphoric mood  The patient is not nervous/anxious  Objective:  Vitals:    07/30/20 0902   BP: 110/70   BP Location: Right arm   Patient Position: Sitting   Cuff Size: Adult   Pulse: 98   Resp: 16   Temp: 98 1 °F (36 7 °C)   SpO2: 98%   Weight: 67 1 kg (148 lb)   Height: 5' 4 5" (1 638 m)     Body mass index is 25 01 kg/m²  Physical Exam   Neck: Normal range of motion  No thyromegaly present  Musculoskeletal: She exhibits tenderness (heel to arch both feet)  Lymphadenopathy:     She has no cervical adenopathy

## 2020-07-30 NOTE — ASSESSMENT & PLAN NOTE
Right upper cervical by history not  Enlarged at present likely on off reactive call if gets large and persists for more than 2 weeks

## 2020-08-13 ENCOUNTER — OFFICE VISIT (OUTPATIENT)
Dept: FAMILY MEDICINE CLINIC | Facility: CLINIC | Age: 65
End: 2020-08-13
Payer: COMMERCIAL

## 2020-08-13 VITALS
HEIGHT: 65 IN | OXYGEN SATURATION: 98 % | RESPIRATION RATE: 16 BRPM | BODY MASS INDEX: 24.32 KG/M2 | DIASTOLIC BLOOD PRESSURE: 80 MMHG | TEMPERATURE: 98.6 F | HEART RATE: 80 BPM | SYSTOLIC BLOOD PRESSURE: 136 MMHG | WEIGHT: 146 LBS

## 2020-08-13 DIAGNOSIS — Z00.00 ANNUAL PHYSICAL EXAM: Primary | ICD-10-CM

## 2020-08-13 DIAGNOSIS — E78.5 DYSLIPIDEMIA: ICD-10-CM

## 2020-08-13 DIAGNOSIS — I10 ESSENTIAL HYPERTENSION: ICD-10-CM

## 2020-08-13 PROBLEM — R59.0 ENLARGED LYMPH NODE IN NECK: Status: RESOLVED | Noted: 2020-07-30 | Resolved: 2020-08-13

## 2020-08-13 PROBLEM — M85.89 OSTEOPENIA OF MULTIPLE SITES: Status: ACTIVE | Noted: 2020-08-13

## 2020-08-13 PROCEDURE — 3075F SYST BP GE 130 - 139MM HG: CPT | Performed by: FAMILY MEDICINE

## 2020-08-13 PROCEDURE — 99396 PREV VISIT EST AGE 40-64: CPT | Performed by: FAMILY MEDICINE

## 2020-08-13 PROCEDURE — 3008F BODY MASS INDEX DOCD: CPT | Performed by: FAMILY MEDICINE

## 2020-08-13 PROCEDURE — 3079F DIAST BP 80-89 MM HG: CPT | Performed by: FAMILY MEDICINE

## 2020-08-13 PROCEDURE — 1036F TOBACCO NON-USER: CPT | Performed by: FAMILY MEDICINE

## 2020-08-13 PROCEDURE — 3725F SCREEN DEPRESSION PERFORMED: CPT | Performed by: FAMILY MEDICINE

## 2020-08-13 RX ORDER — TIMOLOL MALEATE 5 MG/ML
SOLUTION/ DROPS OPHTHALMIC
COMMUNITY
Start: 2020-07-15 | End: 2020-08-13

## 2020-08-13 RX ORDER — VALSARTAN 80 MG/1
160 TABLET ORAL DAILY
COMMUNITY
Start: 2020-07-20 | End: 2020-10-11

## 2020-08-13 NOTE — PROGRESS NOTES
Assessment/Plan:         Problem List Items Addressed This Visit        Cardiovascular and Mediastinum    Essential hypertension     Ok on meds           Relevant Medications    valsartan (DIOVAN) 80 mg tablet       Other    Annual physical exam - Primary     Well visit          Relevant Orders    Comprehensive metabolic panel    CBC and differential    Lipid panel    Urinalysis with microscopic    Dyslipidemia     Pt to have repeat lab                 Subjective:  Pt here for physical pt with HTN on meds mammo and colonoscopy up to date dexa up to  Date pt had enlarged lymph node right neck now resolved     Patient ID: Tom Osullivan is a 59 y o  female  HPI    The following portions of the patient's history were reviewed and updated as appropriate:   She has a past medical history of Diverticulitis of colon and Hypertension  ,  does not have any pertinent problems on file  ,   has a past surgical history that includes Tonsillectomy; Breast lumpectomy (Right); Colonoscopy (10/11/2016); and Mammo (historical) (12/14/2018,11/10/17)  ,  family history includes Hyperlipidemia in her mother; Hypertension in her mother; Lung cancer in her father; No Known Problems in her brother, maternal aunt, maternal grandfather, maternal grandmother, maternal uncle, paternal aunt, paternal grandfather, paternal grandmother, paternal uncle, and sister; Other in her mother; Stroke in her mother  ,   reports that she has never smoked  She has never used smokeless tobacco  She reports current alcohol use of about 3 0 standard drinks of alcohol per week  She reports that she does not use drugs  ,  has No Known Allergies     Current Outpatient Medications   Medication Sig Dispense Refill    carvedilol (COREG) 6 25 mg tablet take 1 tablet by mouth twice a day 60 tablet 3    timolol (BETIMOL) 0 5 % ophthalmic solution Administer 1 drop to both eyes daily      valsartan (DIOVAN) 80 mg tablet Take 160 mg by mouth daily       No current facility-administered medications for this visit  Review of Systems   Constitutional: Negative for appetite change, chills, fatigue and fever  Respiratory: Negative for cough, chest tightness and shortness of breath  Cardiovascular: Negative for chest pain, palpitations and leg swelling  Gastrointestinal: Negative for abdominal pain, constipation, diarrhea, nausea and vomiting  Genitourinary: Negative for difficulty urinating and frequency  Musculoskeletal: Negative for arthralgias, back pain and neck pain  Skin: Negative for rash  Neurological: Negative for dizziness, weakness, light-headedness, numbness and headaches  Hematological: Does not bruise/bleed easily  Psychiatric/Behavioral: Negative for dysphoric mood and sleep disturbance  The patient is not nervous/anxious  Objective:  Vitals:    08/13/20 1329 08/13/20 1349   BP: 140/90 136/80   BP Location: Left arm    Patient Position: Sitting    Cuff Size: Adult    Pulse: 80    Resp: 16    Temp: 98 6 °F (37 °C)    SpO2: 98%    Weight: 66 2 kg (146 lb)    Height: 5' 4 5" (1 638 m)      Body mass index is 24 67 kg/m²  Physical Exam  Vitals signs reviewed  Constitutional:       General: She is not in acute distress  Appearance: Normal appearance  She is well-developed  HENT:      Head: Normocephalic  Right Ear: Tympanic membrane, ear canal and external ear normal       Left Ear: Tympanic membrane, ear canal and external ear normal       Nose: Nose normal       Mouth/Throat:      Pharynx: No oropharyngeal exudate  Eyes:      General: Lids are normal       Conjunctiva/sclera: Conjunctivae normal       Pupils: Pupils are equal, round, and reactive to light  Neck:      Musculoskeletal: Normal range of motion and neck supple  Thyroid: No thyromegaly  Vascular: No carotid bruit  Cardiovascular:      Rate and Rhythm: Normal rate and regular rhythm  Pulses: Normal pulses        Heart sounds: Normal heart sounds  No murmur  No friction rub  Pulmonary:      Effort: Pulmonary effort is normal  No respiratory distress  Breath sounds: Normal breath sounds  No stridor  No wheezing, rhonchi or rales  Abdominal:      General: Bowel sounds are normal  There is no distension  Palpations: Abdomen is soft  There is no mass  Tenderness: There is no abdominal tenderness  There is no guarding  Hernia: No hernia is present  Musculoskeletal: Normal range of motion  Lymphadenopathy:      Cervical: No cervical adenopathy  Skin:     General: Skin is warm and dry  Comments: No abnormal appearing moles   Neurological:      Mental Status: She is alert and oriented to person, place, and time  Cranial Nerves: No cranial nerve deficit  Sensory: No sensory deficit  Motor: No tremor or abnormal muscle tone  Coordination: Coordination normal       Gait: Gait normal       Deep Tendon Reflexes: Reflexes normal    Psychiatric:         Speech: Speech normal          Behavior: Behavior normal          Thought Content:  Thought content normal          Judgment: Judgment normal

## 2020-08-25 LAB
ALBUMIN SERPL-MCNC: 4.3 G/DL (ref 3.6–5.1)
ALBUMIN/GLOB SERPL: 1.7 (CALC) (ref 1–2.5)
ALP SERPL-CCNC: 45 U/L (ref 37–153)
ALT SERPL-CCNC: 21 U/L (ref 6–29)
AST SERPL-CCNC: 20 U/L (ref 10–35)
BASOPHILS # BLD AUTO: 59 CELLS/UL (ref 0–200)
BASOPHILS NFR BLD AUTO: 1.1 %
BILIRUB SERPL-MCNC: 0.4 MG/DL (ref 0.2–1.2)
BUN SERPL-MCNC: 17 MG/DL (ref 7–25)
BUN/CREAT SERPL: ABNORMAL (CALC) (ref 6–22)
CALCIUM SERPL-MCNC: 9.2 MG/DL (ref 8.6–10.4)
CHLORIDE SERPL-SCNC: 101 MMOL/L (ref 98–110)
CHOLEST SERPL-MCNC: 226 MG/DL
CHOLEST/HDLC SERPL: 2.9 (CALC)
CO2 SERPL-SCNC: 30 MMOL/L (ref 20–32)
CREAT SERPL-MCNC: 0.77 MG/DL (ref 0.5–0.99)
EOSINOPHIL # BLD AUTO: 151 CELLS/UL (ref 15–500)
EOSINOPHIL NFR BLD AUTO: 2.8 %
ERYTHROCYTE [DISTWIDTH] IN BLOOD BY AUTOMATED COUNT: 12.1 % (ref 11–15)
GLOBULIN SER CALC-MCNC: 2.6 G/DL (CALC) (ref 1.9–3.7)
GLUCOSE SERPL-MCNC: 104 MG/DL (ref 65–99)
HCT VFR BLD AUTO: 40.2 % (ref 35–45)
HDLC SERPL-MCNC: 77 MG/DL
HGB BLD-MCNC: 13.3 G/DL (ref 11.7–15.5)
LDLC SERPL CALC-MCNC: 134 MG/DL (CALC)
LYMPHOCYTES # BLD AUTO: 2009 CELLS/UL (ref 850–3900)
LYMPHOCYTES NFR BLD AUTO: 37.2 %
MCH RBC QN AUTO: 30.6 PG (ref 27–33)
MCHC RBC AUTO-ENTMCNC: 33.1 G/DL (ref 32–36)
MCV RBC AUTO: 92.6 FL (ref 80–100)
MONOCYTES # BLD AUTO: 340 CELLS/UL (ref 200–950)
MONOCYTES NFR BLD AUTO: 6.3 %
NEUTROPHILS # BLD AUTO: 2840 CELLS/UL (ref 1500–7800)
NEUTROPHILS NFR BLD AUTO: 52.6 %
NONHDLC SERPL-MCNC: 149 MG/DL (CALC)
PLATELET # BLD AUTO: 211 THOUSAND/UL (ref 140–400)
PMV BLD REES-ECKER: 10.4 FL (ref 7.5–12.5)
POTASSIUM SERPL-SCNC: 4 MMOL/L (ref 3.5–5.3)
PROT SERPL-MCNC: 6.9 G/DL (ref 6.1–8.1)
RBC # BLD AUTO: 4.34 MILLION/UL (ref 3.8–5.1)
SL AMB EGFR AFRICAN AMERICAN: 95 ML/MIN/1.73M2
SL AMB EGFR NON AFRICAN AMERICAN: 82 ML/MIN/1.73M2
SODIUM SERPL-SCNC: 136 MMOL/L (ref 135–146)
TRIGL SERPL-MCNC: 59 MG/DL
WBC # BLD AUTO: 5.4 THOUSAND/UL (ref 3.8–10.8)

## 2020-08-26 DIAGNOSIS — E78.5 HYPERLIPIDEMIA, UNSPECIFIED HYPERLIPIDEMIA TYPE: ICD-10-CM

## 2020-08-26 DIAGNOSIS — E78.5 HYPERLIPIDEMIA, UNSPECIFIED HYPERLIPIDEMIA TYPE: Primary | ICD-10-CM

## 2020-08-26 DIAGNOSIS — R73.09 ELEVATED GLUCOSE: Primary | ICD-10-CM

## 2020-08-26 RX ORDER — ATORVASTATIN CALCIUM 10 MG/1
10 TABLET, FILM COATED ORAL DAILY
COMMUNITY
End: 2020-08-26 | Stop reason: SDUPTHER

## 2020-08-26 RX ORDER — ATORVASTATIN CALCIUM 10 MG/1
10 TABLET, FILM COATED ORAL DAILY
Qty: 30 TABLET | Refills: 2 | Status: SHIPPED | OUTPATIENT
Start: 2020-08-26 | End: 2020-11-02 | Stop reason: SDUPTHER

## 2020-08-26 RX ORDER — ATORVASTATIN CALCIUM 10 MG/1
10 TABLET, FILM COATED ORAL
Qty: 30 TABLET | Refills: 2 | Status: CANCELLED | OUTPATIENT
Start: 2020-08-26

## 2020-10-10 DIAGNOSIS — I10 ESSENTIAL HYPERTENSION: Primary | ICD-10-CM

## 2020-10-11 RX ORDER — VALSARTAN 80 MG/1
TABLET ORAL
Qty: 60 TABLET | Refills: 3 | Status: SHIPPED | OUTPATIENT
Start: 2020-10-11 | End: 2021-02-11

## 2020-10-23 ENCOUNTER — TELEPHONE (OUTPATIENT)
Dept: FAMILY MEDICINE CLINIC | Facility: CLINIC | Age: 65
End: 2020-10-23

## 2020-10-31 LAB
CHOLEST SERPL-MCNC: 159 MG/DL
CHOLEST/HDLC SERPL: 2.2 (CALC)
EST. AVERAGE GLUCOSE BLD GHB EST-MCNC: 100 (CALC)
EST. AVERAGE GLUCOSE BLD GHB EST-SCNC: 5.5 (CALC)
HBA1C MFR BLD: 5.1 % OF TOTAL HGB
HDLC SERPL-MCNC: 72 MG/DL
LDLC SERPL CALC-MCNC: 73 MG/DL (CALC)
NONHDLC SERPL-MCNC: 87 MG/DL (CALC)
TRIGL SERPL-MCNC: 48 MG/DL

## 2020-11-02 ENCOUNTER — TELEPHONE (OUTPATIENT)
Dept: FAMILY MEDICINE CLINIC | Facility: CLINIC | Age: 65
End: 2020-11-02

## 2020-11-02 DIAGNOSIS — E78.5 HYPERLIPIDEMIA, UNSPECIFIED HYPERLIPIDEMIA TYPE: ICD-10-CM

## 2020-11-04 DIAGNOSIS — I10 ESSENTIAL HYPERTENSION: ICD-10-CM

## 2020-11-04 RX ORDER — CARVEDILOL 6.25 MG/1
TABLET ORAL
Qty: 60 TABLET | Refills: 3 | Status: SHIPPED | OUTPATIENT
Start: 2020-11-04 | End: 2021-02-25 | Stop reason: DRUGHIGH

## 2020-11-06 RX ORDER — ATORVASTATIN CALCIUM 10 MG/1
10 TABLET, FILM COATED ORAL DAILY
Qty: 90 TABLET | Refills: 2 | Status: SHIPPED | OUTPATIENT
Start: 2020-11-06 | End: 2021-07-26

## 2021-01-18 ENCOUNTER — OFFICE VISIT (OUTPATIENT)
Dept: FAMILY MEDICINE CLINIC | Facility: CLINIC | Age: 66
End: 2021-01-18
Payer: COMMERCIAL

## 2021-01-18 VITALS
SYSTOLIC BLOOD PRESSURE: 156 MMHG | DIASTOLIC BLOOD PRESSURE: 80 MMHG | WEIGHT: 146 LBS | OXYGEN SATURATION: 97 % | HEIGHT: 65 IN | HEART RATE: 72 BPM | TEMPERATURE: 97.8 F | BODY MASS INDEX: 24.32 KG/M2 | RESPIRATION RATE: 14 BRPM

## 2021-01-18 DIAGNOSIS — K57.90 DIVERTICULOSIS: ICD-10-CM

## 2021-01-18 DIAGNOSIS — Z12.31 ENCOUNTER FOR SCREENING MAMMOGRAM FOR MALIGNANT NEOPLASM OF BREAST: ICD-10-CM

## 2021-01-18 DIAGNOSIS — I10 ESSENTIAL HYPERTENSION: ICD-10-CM

## 2021-01-18 DIAGNOSIS — Z78.0 POSTMENOPAUSAL: ICD-10-CM

## 2021-01-18 DIAGNOSIS — Z23 IMMUNIZATION DUE: ICD-10-CM

## 2021-01-18 DIAGNOSIS — M85.89 OSTEOPENIA OF MULTIPLE SITES: ICD-10-CM

## 2021-01-18 DIAGNOSIS — Z00.00 WELCOME TO MEDICARE PREVENTIVE VISIT: Primary | ICD-10-CM

## 2021-01-18 PROCEDURE — 99214 OFFICE O/P EST MOD 30 MIN: CPT | Performed by: FAMILY MEDICINE

## 2021-01-18 PROCEDURE — 90732 PPSV23 VACC 2 YRS+ SUBQ/IM: CPT | Performed by: FAMILY MEDICINE

## 2021-01-18 PROCEDURE — G0009 ADMIN PNEUMOCOCCAL VACCINE: HCPCS | Performed by: FAMILY MEDICINE

## 2021-01-18 PROCEDURE — G0438 PPPS, INITIAL VISIT: HCPCS | Performed by: FAMILY MEDICINE

## 2021-01-18 NOTE — PROGRESS NOTES
Assessment/Plan:         Problem List Items Addressed This Visit        Digestive    Diverticulosis     Told she needs colonosocpy [facility]         Relevant Orders    Ambulatory referral for colonoscopy       Cardiovascular and Mediastinum    Essential hypertension     High reading ling discussion on stopping alcohol pt states she can do this will recheck  bp 1 mo            Musculoskeletal and Integument    Osteopenia of multiple sites     Check  dexa             Other Visit Diagnoses     Welcome to Medicare preventive visit    -  Primary    Postmenopausal        Relevant Orders    DXA bone density spine hip and pelvis    Immunization due        Relevant Orders    PNEUMOCOCCAL POLYSACCHARIDE VACCINE 23-VALENT =>1YO SQ IM    Encounter for screening mammogram for malignant neoplasm of breast        Relevant Orders    Mammo screening bilateral w 3d & cad            Subjective:  interval visit HTN  HL osteopenia due for colonoscopy  Due  For mammo  and pneumonia  Pt wants BP checked  Does drink beer daily  Father was an alcoholic occasional rigth upper abd twinges none now     Patient ID: David Noriega is a 72 y o  female  HPI    The following portions of the patient's history were reviewed and updated as appropriate:   Past Medical History:  She has a past medical history of Diverticulitis of colon and Hypertension  ,  _______________________________________________________________________  Medical Problems:  does not have any pertinent problems on file ,  _______________________________________________________________________  Past Surgical History:   has a past surgical history that includes Tonsillectomy; Breast lumpectomy (Right); Colonoscopy (10/11/2016); and Mammo (historical) (12/14/2018,11/10/17)  ,  _______________________________________________________________________  Family History:  family history includes Hyperlipidemia in her mother; Hypertension in her mother; Lung cancer in her father; No Known Problems in her brother, maternal aunt, maternal grandfather, maternal grandmother, maternal uncle, paternal aunt, paternal grandfather, paternal grandmother, paternal uncle, and sister; Other in her mother; Stroke in her mother ,  _______________________________________________________________________  Social History:   reports that she has never smoked  She has never used smokeless tobacco  She reports current alcohol use of about 3 0 standard drinks of alcohol per week  She reports that she does not use drugs  ,  _______________________________________________________________________  Allergies:  has No Known Allergies     _______________________________________________________________________  Current Outpatient Medications   Medication Sig Dispense Refill    atorvastatin (LIPITOR) 10 mg tablet Take 1 tablet (10 mg total) by mouth daily 90 tablet 2    carvedilol (COREG) 6 25 mg tablet take 1 tablet by mouth twice a day 60 tablet 3    timolol (BETIMOL) 0 5 % ophthalmic solution Administer 1 drop to both eyes daily      valsartan (DIOVAN) 80 mg tablet TAKE 2 TABLETS BY MOUTH DAILY 60 tablet 3     No current facility-administered medications for this visit       _______________________________________________________________________  Review of Systems   Constitutional: Negative for appetite change, chills, fatigue and fever  Respiratory: Negative for cough, chest tightness and shortness of breath  Cardiovascular: Negative for chest pain, palpitations and leg swelling  Gastrointestinal: Negative for abdominal pain, constipation, diarrhea, nausea and vomiting  Genitourinary: Negative for difficulty urinating and frequency  Musculoskeletal: Negative for arthralgias, back pain and neck pain  Skin: Negative for rash  Neurological: Negative for dizziness, weakness, light-headedness, numbness and headaches  Hematological: Does not bruise/bleed easily     Psychiatric/Behavioral: Negative for dysphoric mood (still grieving mothers death) and sleep disturbance  The patient is not nervous/anxious  Objective:  Vitals:    01/18/21 1340 01/18/21 1437   BP: 140/98 156/80   BP Location: Left arm    Patient Position: Sitting    Pulse: 72    Resp: 14    Temp: 97 8 °F (36 6 °C)    SpO2: 97%    Weight: 66 2 kg (146 lb)    Height: 5' 4 5" (1 638 m)      Body mass index is 24 67 kg/m²  Physical Exam  Vitals signs reviewed  Constitutional:       General: She is not in acute distress  Appearance: Normal appearance  She is well-developed  She is not ill-appearing  Eyes:      Conjunctiva/sclera: Conjunctivae normal       Pupils: Pupils are equal, round, and reactive to light  Neck:      Musculoskeletal: Normal range of motion and neck supple  Thyroid: No thyromegaly  Vascular: No carotid bruit  Cardiovascular:      Rate and Rhythm: Normal rate and regular rhythm  Heart sounds: Normal heart sounds  No murmur  Pulmonary:      Effort: Pulmonary effort is normal  No respiratory distress  Breath sounds: Normal breath sounds  Chest:      Chest wall: No tenderness  Breasts:         Right: Normal  No swelling, bleeding, inverted nipple, mass, nipple discharge, skin change or tenderness  Left: Normal  No swelling, bleeding, inverted nipple, mass, nipple discharge, skin change or tenderness  Abdominal:      General: Bowel sounds are normal  There is no distension  Palpations: Abdomen is soft  There is no mass  Tenderness: There is no abdominal tenderness  There is no right CVA tenderness, left CVA tenderness, guarding or rebound  Hernia: No hernia is present  Lymphadenopathy:      Cervical: No cervical adenopathy  Skin:     General: Skin is warm and dry  Neurological:      Mental Status: She is alert and oriented to person, place, and time  Cranial Nerves: No cranial nerve deficit        Deep Tendon Reflexes: Reflexes normal    Psychiatric: Behavior: Behavior normal          Thought Content:  Thought content normal

## 2021-01-18 NOTE — PROGRESS NOTES
Assessment and Plan:     Problem List Items Addressed This Visit     None           Preventive health issues were discussed with patient, and age appropriate screening tests were ordered as noted in patient's After Visit Summary  Personalized health advice and appropriate referrals for health education or preventive services given if needed, as noted in patient's After Visit Summary       History of Present Illness:     Patient presents for Medicare Annual Wellness visit    Patient Care Team:  Kyrie Encarnacion MD as PCP - General (Family Medicine)  Ganga Mcnally MD as PCP - [address] [place] [address] (RTE)     Problem List:     Patient Active Problem List   Diagnosis    Plantar fasciitis, bilateral    Essential hypertension    Other specified glaucoma    Annual physical exam    Dyslipidemia    Osteopenia of multiple sites      Past Medical and Surgical History:     Past Medical History:   Diagnosis Date    Diverticulitis of colon     Hypertension      Past Surgical History:   Procedure Laterality Date    BREAST LUMPECTOMY Right     COLONOSCOPY  10/11/2016    MAMMO (HISTORICAL)  12/14/2018,11/10/17    TONSILLECTOMY        Family History:     Family History   Problem Relation Age of Onset    Hyperlipidemia Mother     Hypertension Mother     Other Mother         cerebrovascular accident   [place] Stroke Mother    [place] Lung cancer Father     No Known Problems Sister     No Known Problems Brother     No Known Problems Maternal Aunt     No Known Problems Maternal Uncle     No Known Problems Paternal Aunt     No Known Problems Paternal Uncle     No Known Problems Maternal Grandmother     No Known Problems Maternal Grandfather     No Known Problems Paternal Grandmother     No Known Problems Paternal Grandfather     ADD / ADHD Neg Hx     Anesthesia problems Neg Hx     Cancer Neg Hx     Clotting disorder Neg Hx     Collagen disease Neg Hx     Diabetes Neg Hx     Dislocations Neg Hx     Learning disabilities Neg Hx     Neurological problems Neg Hx     Osteoporosis Neg Hx     Rheumatologic disease Neg Hx     Scoliosis Neg Hx     Vascular Disease Neg Hx       Social History:        Social History     Socioeconomic History    Marital status: /Civil Union     Spouse name: None    Number of children: None    Years of education: None    Highest education level: None   Occupational History    None   Social Needs    Financial resource strain: None    Food insecurity     Worry: None     Inability: None    Transportation needs     Medical: None     Non-medical: None   Tobacco Use    Smoking status: Never Smoker    Smokeless tobacco: Never Used   Substance and Sexual Activity    Alcohol use: Yes     Alcohol/week: 3 0 standard drinks     Types: 3 Cans of beer per week    Drug use: No    Sexual activity: None   Lifestyle    Physical activity     Days per week: None     Minutes per session: None    Stress: None   Relationships    Social connections     Talks on phone: None     Gets together: None     Attends Zoroastrianism service: None     Active member of club or organization: None     Attends meetings of clubs or organizations: None     Relationship status: None    Intimate partner violence     Fear of current or ex partner: None     Emotionally abused: None     Physically abused: None     Forced sexual activity: None   Other Topics Concern    None   Social History Narrative    Tobacco smoking status:   Never smoker        Most recent tobacco use screenin2018          Alcohol intake:    Moderate    Per jackie      Medications and Allergies:     Current Outpatient Medications   Medication Sig Dispense Refill    atorvastatin (LIPITOR) 10 mg tablet Take 1 tablet (10 mg total) by mouth daily 90 tablet 2    carvedilol (COREG) 6 25 mg tablet take 1 tablet by mouth twice a day 60 tablet 3    timolol (BETIMOL) 0 5 % ophthalmic solution Administer 1 drop to both eyes daily      valsartan (DIOVAN) 80 mg tablet TAKE 2 TABLETS BY MOUTH DAILY 60 tablet 3     No current facility-administered medications for this visit  No Known Allergies   Immunizations:     Immunization History   Administered Date(s) Administered    Rabies-IM Human Diploid Cell Culture 09/12/2014, 09/15/2014, 09/19/2014      Health Maintenance:         Topic Date Due    HIV Screening  10/13/1970    MAMMOGRAM  08/13/2021 (Originally 12/16/2020)    Colorectal Cancer Screening  10/11/2026    Hepatitis C Screening  Completed         Topic Date Due    Influenza Vaccine (1) 09/01/2020    Pneumococcal Vaccine: 65+ Years (1 of 1 - PPSV23) 10/13/2020      Medicare Health Risk Assessment:     Temp 97 8 °F (36 6 °C)   Ht 5' 4 5" (1 638 m)   Wt 66 2 kg (146 lb)   BMI 24 67 kg/m²      Edwigenereyda Ramires is here for her Welcome to Medicare visit  Health Risk Assessment:   Patient rates overall health as very good  Patient feels that their physical health rating is same  Eyesight was rated as same  Hearing was rated as same  Patient feels that their emotional and mental health rating is slightly worse  Pain experienced in the last 7 days has been some  Patient's pain rating has been 2/10  Patient states that she has experienced no weight loss or gain in last 6 months  Depression Screening:   PHQ-2 Score: 0      Fall Risk Screening: In the past year, patient has experienced: no history of falling in past year      Urinary Incontinence Screening:   Patient has not leaked urine accidently in the last six months  Home Safety:  Patient does not have trouble with stairs inside or outside of their home  Patient has working smoke alarms and has working carbon monoxide detector  Home safety hazards include: none  Nutrition:   Current diet is Low Cholesterol  Medications:   Patient is currently taking over-the-counter supplements  OTC medications include: vitamins  Patient is able to manage medications       Activities of Daily Living (ADLs)/Instrumental Activities of Daily Living (IADLs):   Walk and transfer into and out of bed and chair?: Yes  Dress and groom yourself?: Yes    Bathe or shower yourself?: Yes    Feed yourself?  Yes  Do your laundry/housekeeping?: Yes  Manage your money, pay your bills and track your expenses?: Yes  Make your own meals?: Yes    Do your own shopping?: Yes    Previous Hospitalizations:   Any hospitalizations or ED visits within the last 12 months?: No      Advance Care Planning:   Living will: No    Durable POA for healthcare: No    Advanced directive: No    Advanced directive counseling given: No    Five wishes given: Yes    Patient declined ACP directive: Yes      Cognitive Screening:   Provider or family/friend/caregiver concerned regarding cognition?: No    PREVENTIVE SCREENINGS      Cardiovascular Screening:    General: Screening Not Indicated and History Lipid Disorder      Diabetes Screening:     General: Screening Current      Colorectal Cancer Screening:     General: Screening Current    Due for: Colonoscopy - Low Risk      Breast Cancer Screening:     General: Screening Current    Due for: Mammogram        Cervical Cancer Screening:    General: Screening Not Indicated      Osteoporosis Screening:      Due for: DXA Axial      Abdominal Aortic Aneurysm (AAA) Screening:        General: Screening Not Indicated      Lung Cancer Screening:     General: Screening Not Indicated      Hepatitis C Screening:    General: Screening Current      Alysha Chauhan MD

## 2021-01-18 NOTE — PATIENT INSTRUCTIONS
Medicare Preventive Visit Patient Instructions  Thank you for completing your Welcome to Medicare Visit or Medicare Annual Wellness Visit today  Your next wellness visit will be due in one year (1/18/2022)  The screening/preventive services that you may require over the next 5-10 years are detailed below  Some tests may not apply to you based off risk factors and/or age  Screening tests ordered at today's visit but not completed yet may show as past due  Also, please note that scanned in results may not display below  Preventive Screenings:  Service Recommendations Previous Testing/Comments   Colorectal Cancer Screening  * Colonoscopy    * Fecal Occult Blood Test (FOBT)/Fecal Immunochemical Test (FIT)  * Fecal DNA/Cologuard Test  * Flexible Sigmoidoscopy Age: 54-65 years old   Colonoscopy: every 10 years (may be performed more frequently if at higher risk)  OR  FOBT/FIT: every 1 year  OR  Cologuard: every 3 years  OR  Sigmoidoscopy: every 5 years  Screening may be recommended earlier than age 48 if at higher risk for colorectal cancer  Also, an individualized decision between you and your healthcare provider will decide whether screening between the ages of 74-80 would be appropriate  Colonoscopy: 10/11/2016  FOBT/FIT: Not on file  Cologuard: Not on file  Sigmoidoscopy: Not on file    Screening Current     Breast Cancer Screening Age: 36 years old  Frequency: every 1-2 years  Not required if history of left and right mastectomy Mammogram: 12/16/2019    Screening Current   Cervical Cancer Screening Between the ages of 21-29, pap smear recommended once every 3 years  Between the ages of 33-67, can perform pap smear with HPV co-testing every 5 years     Recommendations may differ for women with a history of total hysterectomy, cervical cancer, or abnormal pap smears in past  Pap Smear: Not on file    Screening Not Indicated   Hepatitis C Screening Once for adults born between 1945 and 1965  More frequently in patients at high risk for Hepatitis C Hep C Antibody: 06/15/2020    Screening Current   Diabetes Screening 1-2 times per year if you're at risk for diabetes or have pre-diabetes Fasting glucose: No results in last 5 years   A1C: 5 1 % of total Hgb    Screening Current   Cholesterol Screening Once every 5 years if you don't have a lipid disorder  May order more often based on risk factors  Lipid panel: 10/30/2020    Screening Not Indicated  History Lipid Disorder     Other Preventive Screenings Covered by Medicare:  1  Abdominal Aortic Aneurysm (AAA) Screening: covered once if your at risk  You're considered to be at risk if you have a family history of AAA  2  Lung Cancer Screening: covers low dose CT scan once per year if you meet all of the following conditions: (1) Age 50-69; (2) No signs or symptoms of lung cancer; (3) Current smoker or have quit smoking within the last 15 years; (4) You have a tobacco smoking history of at least 30 pack years (packs per day multiplied by number of years you smoked); (5) You get a written order from a healthcare provider  3  Glaucoma Screening: covered annually if you're considered high risk: (1) You have diabetes OR (2) Family history of glaucoma OR (3)  aged 48 and older OR (3)  American aged 72 and older  3  Osteoporosis Screening: covered every 2 years if you meet one of the following conditions: (1) You're estrogen deficient and at risk for osteoporosis based off medical history and other findings; (2) Have a vertebral abnormality; (3) On glucocorticoid therapy for more than 3 months; (4) Have primary hyperparathyroidism; (5) On osteoporosis medications and need to assess response to drug therapy  · Last bone density test (DXA Scan): 05/30/2018  5  HIV Screening: covered annually if you're between the age of 12-76  Also covered annually if you are younger than 13 and older than 72 with risk factors for HIV infection   For pregnant patients, it is covered up to 3 times per pregnancy  Immunizations:  Immunization Recommendations   Influenza Vaccine Annual influenza vaccination during flu season is recommended for all persons aged >= 6 months who do not have contraindications   Pneumococcal Vaccine (Prevnar and Pneumovax)  * Prevnar = PCV13  * Pneumovax = PPSV23   Adults 25-60 years old: 1-3 doses may be recommended based on certain risk factors  Adults 72 years old: Prevnar (PCV13) vaccine recommended followed by Pneumovax (PPSV23) vaccine  If already received PPSV23 since turning 65, then PCV13 recommended at least one year after PPSV23 dose  Hepatitis B Vaccine 3 dose series if at intermediate or high risk (ex: diabetes, end stage renal disease, liver disease)   Tetanus (Td) Vaccine - COST NOT COVERED BY MEDICARE PART B Following completion of primary series, a booster dose should be given every 10 years to maintain immunity against tetanus  Td may also be given as tetanus wound prophylaxis  Tdap Vaccine - COST NOT COVERED BY MEDICARE PART B Recommended at least once for all adults  For pregnant patients, recommended with each pregnancy  Shingles Vaccine (Shingrix) - COST NOT COVERED BY MEDICARE PART B  2 shot series recommended in those aged 48 and above     Health Maintenance Due:      Topic Date Due    HIV Screening  10[birth year]13/1970    MAMMOGRAM  08/13/2021 (Originally 12/16/2020)    Colorectal Cancer Screening  10/11/2026    Hepatitis C Screening  Completed     Immunizations Due:      Topic Date Due    Influenza Vaccine (1) 09/01/2020    Pneumococcal Vaccine: 65+ Years (1 of 1 - PPSV23) 10/13/2020     Advance Directives   What are advance directives? Advance directives are legal documents that state your wishes and plans for medical care  These plans are made ahead of time in case you lose your ability to make decisions for yourself   Advance directives can apply to any medical decision, such as the treatments you want, and if you want to donate organs  What are the types of advance directives? There are many types of advance directives, and each state has rules about how to use them  You may choose a combination of any of the following:  · Living will: This is a written record of the treatment you want  You can also choose which treatments you do not want, which to limit, and which to stop at a certain time  This includes surgery, medicine, IV fluid, and tube feedings  · Durable power of  for healthcare [facility]): This is a written record that states who you want to make healthcare choices for you when you are unable to make them for yourself  This person, called a proxy, is usually a family member or a friend  You may choose more than 1 proxy  · Do not resuscitate (DNR) order:  A DNR order is used in case your heart stops beating or you stop breathing  It is a request not to have certain forms of treatment, such as CPR  A DNR order may be included in other types of advance directives  · Medical directive: This covers the care that you want if you are in a coma, near death, or unable to make decisions for yourself  You can list the treatments you want for each condition  Treatment may include pain medicine, surgery, blood transfusions, dialysis, IV or tube feedings, and a ventilator (breathing machine)  · Values history: This document has questions about your views, beliefs, and how you feel and think about life  This information can help others choose the care that you would choose  Why are advance directives important? An advance directive helps you control your care  Although spoken wishes may be used, it is better to have your wishes written down  Spoken wishes can be misunderstood, or not followed  Treatments may be given even if you do not want them  An advance directive may make it easier for your family to make difficult choices about your care        © Copyright [employer] 2018 Information is for End User's use only and may not be sold, redistributed or otherwise used for commercial purposes   All illustrations and images included in CareNotes® are the copyrighted property of A D A M , Inc  or [facility] Horacio Maya

## 2021-01-22 ENCOUNTER — HOSPITAL ENCOUNTER (OUTPATIENT)
Dept: RADIOLOGY | Facility: HOSPITAL | Age: 66
Discharge: HOME | End: 2021-01-22
Payer: COMMERCIAL

## 2021-01-22 VITALS — HEIGHT: 65 IN | WEIGHT: 146 LBS | BODY MASS INDEX: 24.32 KG/M2

## 2021-01-22 DIAGNOSIS — Z12.31 ENCOUNTER FOR SCREENING MAMMOGRAM FOR MALIGNANT NEOPLASM OF BREAST: ICD-10-CM

## 2021-01-22 PROCEDURE — 77063 BREAST TOMOSYNTHESIS BI: CPT

## 2021-01-22 PROCEDURE — 77067 SCR MAMMO BI INCL CAD: CPT

## 2021-01-25 ENCOUNTER — TELEPHONE (OUTPATIENT)
Dept: GASTROENTEROLOGY | Facility: CLINIC | Age: 66
End: 2021-01-25

## 2021-01-25 NOTE — TELEPHONE ENCOUNTER
Called and spoke to pt and informed that as soon as Dr Jose J Schaefer schedule is finalized I will call her to get her rescheduled  Pt lives in [place] so will try to r/s her at [facility]

## 2021-01-25 NOTE — TELEPHONE ENCOUNTER
----- Message from Tameka Jernigan sent at 1/25/2021 10:41 AM EST -----  Regarding: reschedule  Devon Coker   This pts ins is out of network with no out of network benefits  Please call her to reschedule      Thanks cg

## 2021-02-11 DIAGNOSIS — I10 ESSENTIAL HYPERTENSION: ICD-10-CM

## 2021-02-11 RX ORDER — VALSARTAN 80 MG/1
TABLET ORAL
Qty: 60 TABLET | Refills: 3 | Status: SHIPPED | OUTPATIENT
Start: 2021-02-11 | End: 2021-02-25 | Stop reason: DRUGHIGH

## 2021-02-21 ENCOUNTER — HOSPITAL ENCOUNTER (EMERGENCY)
Facility: HOSPITAL | Age: 66
Discharge: HOME | End: 2021-02-21
Attending: EMERGENCY MEDICINE
Payer: COMMERCIAL

## 2021-02-21 ENCOUNTER — APPOINTMENT (EMERGENCY)
Dept: RADIOLOGY | Facility: HOSPITAL | Age: 66
End: 2021-02-21
Payer: COMMERCIAL

## 2021-02-21 VITALS
HEART RATE: 66 BPM | RESPIRATION RATE: 18 BRPM | SYSTOLIC BLOOD PRESSURE: 170 MMHG | DIASTOLIC BLOOD PRESSURE: 85 MMHG | TEMPERATURE: 98 F | OXYGEN SATURATION: 98 %

## 2021-02-21 DIAGNOSIS — M79.602 LEFT ARM PAIN: Primary | ICD-10-CM

## 2021-02-21 LAB
ALBUMIN SERPL BCP-MCNC: 4 G/DL (ref 3.5–5)
ALP SERPL-CCNC: 53 U/L (ref 46–116)
ALT SERPL W P-5'-P-CCNC: 48 U/L (ref 12–78)
ANION GAP SERPL CALCULATED.3IONS-SCNC: 8 MMOL/L (ref 4–13)
APTT PPP: 29 SECONDS (ref 23–37)
AST SERPL W P-5'-P-CCNC: 24 U/L (ref 5–45)
BASOPHILS # BLD AUTO: 0.04 THOUSANDS/ΜL (ref 0–0.1)
BASOPHILS NFR BLD AUTO: 1 % (ref 0–1)
BILIRUB SERPL-MCNC: 0.5 MG/DL (ref 0.2–1)
BILIRUB UR QL STRIP: NEGATIVE
BUN SERPL-MCNC: 14 MG/DL (ref 5–25)
CALCIUM SERPL-MCNC: 9.2 MG/DL (ref 8.3–10.1)
CHLORIDE SERPL-SCNC: 101 MMOL/L (ref 100–108)
CLARITY UR: CLEAR
CO2 SERPL-SCNC: 29 MMOL/L (ref 21–32)
COLOR UR: YELLOW
CREAT SERPL-MCNC: 0.82 MG/DL (ref 0.6–1.3)
D DIMER PPP FEU-MCNC: <0.27 UG/ML FEU
EOSINOPHIL # BLD AUTO: 0.18 THOUSAND/ΜL (ref 0–0.61)
EOSINOPHIL NFR BLD AUTO: 4 % (ref 0–6)
ERYTHROCYTE [DISTWIDTH] IN BLOOD BY AUTOMATED COUNT: 12.2 % (ref 11.6–15.1)
GFR SERPL CREATININE-BSD FRML MDRD: 75 ML/MIN/1.73SQ M
GLUCOSE SERPL-MCNC: 109 MG/DL (ref 65–140)
GLUCOSE UR STRIP-MCNC: NEGATIVE MG/DL
HCT VFR BLD AUTO: 42.3 % (ref 34.8–46.1)
HGB BLD-MCNC: 13.5 G/DL (ref 11.5–15.4)
HGB UR QL STRIP.AUTO: NEGATIVE
IMM GRANULOCYTES # BLD AUTO: 0.01 THOUSAND/UL (ref 0–0.2)
IMM GRANULOCYTES NFR BLD AUTO: 0 % (ref 0–2)
INR PPP: 1.01 (ref 0.84–1.19)
KETONES UR STRIP-MCNC: NEGATIVE MG/DL
LEUKOCYTE ESTERASE UR QL STRIP: NEGATIVE
LYMPHOCYTES # BLD AUTO: 2.06 THOUSANDS/ΜL (ref 0.6–4.47)
LYMPHOCYTES NFR BLD AUTO: 40 % (ref 14–44)
MCH RBC QN AUTO: 29.9 PG (ref 26.8–34.3)
MCHC RBC AUTO-ENTMCNC: 31.9 G/DL (ref 31.4–37.4)
MCV RBC AUTO: 94 FL (ref 82–98)
MONOCYTES # BLD AUTO: 0.32 THOUSAND/ΜL (ref 0.17–1.22)
MONOCYTES NFR BLD AUTO: 6 % (ref 4–12)
NEUTROPHILS # BLD AUTO: 2.54 THOUSANDS/ΜL (ref 1.85–7.62)
NEUTS SEG NFR BLD AUTO: 49 % (ref 43–75)
NITRITE UR QL STRIP: NEGATIVE
NRBC BLD AUTO-RTO: 0 /100 WBCS
NT-PROBNP SERPL-MCNC: 276 PG/ML
PH UR STRIP.AUTO: 6 [PH]
PLATELET # BLD AUTO: 196 THOUSANDS/UL (ref 149–390)
PMV BLD AUTO: 9.6 FL (ref 8.9–12.7)
POTASSIUM SERPL-SCNC: 4 MMOL/L (ref 3.5–5.3)
PROT SERPL-MCNC: 7.7 G/DL (ref 6.4–8.2)
PROT UR STRIP-MCNC: NEGATIVE MG/DL
PROTHROMBIN TIME: 13.2 SECONDS (ref 11.6–14.5)
RBC # BLD AUTO: 4.52 MILLION/UL (ref 3.81–5.12)
SODIUM SERPL-SCNC: 138 MMOL/L (ref 136–145)
SP GR UR STRIP.AUTO: 1.01 (ref 1–1.03)
TROPONIN I SERPL-MCNC: <0.02 NG/ML
UROBILINOGEN UR QL STRIP.AUTO: 0.2 E.U./DL
WBC # BLD AUTO: 5.15 THOUSAND/UL (ref 4.31–10.16)

## 2021-02-21 PROCEDURE — 85730 THROMBOPLASTIN TIME PARTIAL: CPT | Performed by: EMERGENCY MEDICINE

## 2021-02-21 PROCEDURE — 85610 PROTHROMBIN TIME: CPT | Performed by: EMERGENCY MEDICINE

## 2021-02-21 PROCEDURE — 85025 COMPLETE CBC W/AUTO DIFF WBC: CPT | Performed by: EMERGENCY MEDICINE

## 2021-02-21 PROCEDURE — 84484 ASSAY OF TROPONIN QUANT: CPT | Performed by: EMERGENCY MEDICINE

## 2021-02-21 PROCEDURE — 80053 COMPREHEN METABOLIC PANEL: CPT | Performed by: EMERGENCY MEDICINE

## 2021-02-21 PROCEDURE — 36415 COLL VENOUS BLD VENIPUNCTURE: CPT | Performed by: EMERGENCY MEDICINE

## 2021-02-21 PROCEDURE — 93005 ELECTROCARDIOGRAM TRACING: CPT

## 2021-02-21 PROCEDURE — 99282 EMERGENCY DEPT VISIT SF MDM: CPT | Performed by: EMERGENCY MEDICINE

## 2021-02-21 PROCEDURE — 99284 EMERGENCY DEPT VISIT MOD MDM: CPT

## 2021-02-21 PROCEDURE — 71045 X-RAY EXAM CHEST 1 VIEW: CPT

## 2021-02-21 PROCEDURE — 81003 URINALYSIS AUTO W/O SCOPE: CPT | Performed by: EMERGENCY MEDICINE

## 2021-02-21 PROCEDURE — 85379 FIBRIN DEGRADATION QUANT: CPT | Performed by: EMERGENCY MEDICINE

## 2021-02-21 PROCEDURE — 83880 ASSAY OF NATRIURETIC PEPTIDE: CPT | Performed by: EMERGENCY MEDICINE

## 2021-02-21 NOTE — ED PROVIDER NOTES
History  Chief Complaint   Patient presents with    Arm Pain     intermittent pinching type pain to L armpit/chest for a couple of weeks  none now just read that L arm pain could be a heart problem  no other assoc symtoms     40-year-old female states that she has had some intermittent pinching type pain to her left armpit and chest muscle the last couple of weeks  Patient states she has been shoveling snow lot and been working on a project at home where she is using a screwdriver  She denies any shortness of breath nausea or vomiting no radiation of the pain  She states that she went on the Internet and a told her this could be a heart problem so she wanted to get it checked  Patient does have a history of high blood pressure high cholesterol for which she takes medication for  History provided by:  Patient and spouse   used: No        Prior to Admission Medications   Prescriptions Last Dose Informant Patient Reported? Taking?   atorvastatin (LIPITOR) 10 mg tablet 2/21/2021 at Unknown time  No Yes   Sig: Take 1 tablet (10 mg total) by mouth daily   carvedilol (COREG) 6 25 mg tablet 2/21/2021 at Unknown time  No Yes   Sig: take 1 tablet by mouth twice a day   timolol (BETIMOL) 0 5 % ophthalmic solution 2/21/2021 at Unknown time Self Yes Yes   Sig: Administer 1 drop to both eyes 2 (two) times a day    valsartan (DIOVAN) 80 mg tablet 2/21/2021 at Unknown time  No Yes   Sig: TAKE 2 TABLETS BY MOUTH DAILY      Facility-Administered Medications: None       Past Medical History:   Diagnosis Date    Diverticulitis of colon     Glaucoma     Hypercholesteremia     Hypertension        Past Surgical History:   Procedure Laterality Date    BREAST CYST EXCISION Right     benign - 2000 approx      COLONOSCOPY  10/11/2016    MAMMO (HISTORICAL)  12/14/2018,11/10/17    TONSILLECTOMY         Family History   Problem Relation Age of Onset    Hyperlipidemia Mother     Hypertension Mother    Isai Other Mother         cerebrovascular accident   Angela Anthony Stroke Mother    Angela Anthony Lung cancer Father     No Known Problems Brother     No Known Problems Maternal Aunt     No Known Problems Maternal Uncle     Breast cancer Paternal Aunt     No Known Problems Paternal Uncle     No Known Problems Maternal Grandmother     No Known Problems Maternal Grandfather     No Known Problems Paternal Grandmother     No Known Problems Paternal Grandfather     No Known Problems Brother     ADD / ADHD Neg Hx     Anesthesia problems Neg Hx     Cancer Neg Hx     Clotting disorder Neg Hx     Collagen disease Neg Hx     Diabetes Neg Hx     Dislocations Neg Hx     Learning disabilities Neg Hx     Neurological problems Neg Hx     Osteoporosis Neg Hx     Rheumatologic disease Neg Hx     Scoliosis Neg Hx     Vascular Disease Neg Hx      I have reviewed and agree with the history as documented  E-Cigarette/Vaping    E-Cigarette Use Never User      E-Cigarette/Vaping Substances     Social History     Tobacco Use    Smoking status: Never Smoker    Smokeless tobacco: Never Used   Substance Use Topics    Alcohol use: Yes     Alcohol/week: 3 0 standard drinks     Types: 3 Cans of beer per week     Comment: states no alcohol beer now    Drug use: No       Review of Systems   Constitutional: Negative for activity change, chills, diaphoresis and fever  HENT: Negative for congestion, ear pain, nosebleeds, sore throat, trouble swallowing and voice change  Eyes: Negative for pain, discharge and redness  Respiratory: Negative for apnea, cough, choking, shortness of breath, wheezing and stridor  Cardiovascular: Positive for chest pain  Negative for palpitations  Gastrointestinal: Negative for abdominal distention, abdominal pain, constipation, diarrhea, nausea and vomiting  Endocrine: Negative for polydipsia  Genitourinary: Negative for difficulty urinating, dysuria, flank pain, frequency, hematuria and urgency  Musculoskeletal: Negative for back pain, gait problem, joint swelling, myalgias, neck pain and neck stiffness  Skin: Negative for pallor and rash  Neurological: Negative for dizziness, tremors, syncope, speech difficulty, weakness, numbness and headaches  Hematological: Negative for adenopathy  Psychiatric/Behavioral: Negative for confusion, hallucinations, self-injury and suicidal ideas  The patient is not nervous/anxious  Physical Exam  Physical Exam  Vitals signs and nursing note reviewed  Constitutional:       General: She is not in acute distress  Appearance: She is well-developed  She is not diaphoretic  HENT:      Head: Normocephalic and atraumatic  Right Ear: External ear normal       Left Ear: External ear normal       Nose: Nose normal    Eyes:      Conjunctiva/sclera: Conjunctivae normal       Pupils: Pupils are equal, round, and reactive to light  Neck:      Musculoskeletal: Normal range of motion and neck supple  Cardiovascular:      Rate and Rhythm: Normal rate and regular rhythm  Heart sounds: Normal heart sounds  Pulmonary:      Effort: Pulmonary effort is normal       Breath sounds: Normal breath sounds  Abdominal:      General: Bowel sounds are normal       Palpations: Abdomen is soft  Musculoskeletal: Normal range of motion  Skin:     General: Skin is warm and dry  Neurological:      Mental Status: She is alert and oriented to person, place, and time  Deep Tendon Reflexes: Reflexes are normal and symmetric  Psychiatric:         Behavior: Behavior is cooperative           Vital Signs  ED Triage Vitals   Temperature Pulse Respirations Blood Pressure SpO2   02/21/21 1358 02/21/21 1358 02/21/21 1358 02/21/21 1415 02/21/21 1358   97 8 °F (36 6 °C) 73 20 (!) 219/88 96 %      Temp Source Heart Rate Source Patient Position - Orthostatic VS BP Location FiO2 (%)   02/21/21 1358 02/21/21 1358 02/21/21 1358 02/21/21 1358 --   Tympanic Monitor Sitting Left arm       Pain Score       02/21/21 1358       No Pain           Vitals:    02/21/21 1358 02/21/21 1415 02/21/21 1445 02/21/21 1500   BP:  (!) 219/88     Pulse: 73  62 64   Patient Position - Orthostatic VS: Sitting            Visual Acuity      ED Medications  Medications - No data to display    Diagnostic Studies  Results Reviewed     Procedure Component Value Units Date/Time    UA (URINE) with reflex to Scope [[id number]] Collected: 02/21/21 1526    Lab Status: Final result Specimen: Urine, Clean Catch Updated: 02/21/21 1532     Color, UA Yellow     Clarity, UA Clear     Specific Gravity, UA 1 015     pH, UA 6 0     Leukocytes, UA Negative     Nitrite, UA Negative     Protein, UA Negative mg/dl      Glucose, UA Negative mg/dl      Ketones, UA Negative mg/dl      Urobilinogen, UA 0 2 E U /dl      Bilirubin, UA Negative     Blood, UA Negative    NT-BNP PRO [[id number]]  (Abnormal) Collected: 02/21/21 1423    Lab Status: Final result Specimen: Blood from Arm, Right Updated: 02/21/21 1455     NT-proBNP 276 pg/mL     Troponin I [[id number]]  (Normal) Collected: 02/21/21 1423    Lab Status: Final result Specimen: Blood from Arm, Right Updated: 02/21/21 1454     Troponin I <0 02 ng/mL     Comprehensive metabolic panel [[id number]] Collected: 02/21/21 1423    Lab Status: Final result Specimen: Blood from Arm, Right Updated: 02/21/21 1449     Sodium 138 mmol/L      Potassium 4 0 mmol/L      Chloride 101 mmol/L      CO2 29 mmol/L      ANION GAP 8 mmol/L      BUN 14 mg/dL      Creatinine 0 82 mg/dL      Glucose 109 mg/dL      Calcium 9 2 mg/dL      AST 24 U/L      ALT 48 U/L      Alkaline Phosphatase 53 U/L      Total Protein 7 7 g/dL      Albumin 4 0 g/dL      Total Bilirubin 0 50 mg/dL      eGFR 75 ml/min/1 73sq m     Narrative:      Jose guidelines for Chronic Kidney Disease (CKD):     Stage 1 with normal or high GFR (GFR > 90 mL/min/1 73 square meters)    Stage 2 Mild CKD (GFR = 60-89 mL/min/1 73 square meters)    Stage 3A Moderate CKD (GFR = 45-59 mL/min/1 73 square meters)    Stage 3B Moderate CKD (GFR = 30-44 mL/min/1 73 square meters)    Stage 4 Severe CKD (GFR = 15-29 mL/min/1 73 square meters)    Stage 5 End Stage CKD (GFR <15 mL/min/1 73 square meters)  Note: GFR calculation is accurate only with a steady state creatinine    D-Dimer [[id number]]  (Normal) Collected: 02/21/21 1423    Lab Status: Final result Specimen: Blood from Arm, Right Updated: 02/21/21 1448     D-Dimer, Quant <0 27 ug/ml FEU     Protime-INR [[id number]]  (Normal) Collected: 02/21/21 1423    Lab Status: Final result Specimen: Blood from Arm, Right Updated: 02/21/21 1444     Protime 13 2 seconds      INR 1 01    APTT [[id number]]  (Normal) Collected: 02/21/21 1423    Lab Status: Final result Specimen: Blood from Arm, Right Updated: 02/21/21 1444     PTT 29 seconds     CBC and differential [[id number]] Collected: 02/21/21 1423    Lab Status: Final result Specimen: Blood from Arm, Right Updated: 02/21/21 1432     WBC 5 15 Thousand/uL      RBC 4 52 Million/uL      Hemoglobin 13 5 g/dL      Hematocrit 42 3 %      MCV 94 fL      MCH 29 9 pg      MCHC 31 9 g/dL      RDW 12 2 %      MPV 9 6 fL      Platelets 516 Thousands/uL      nRBC 0 /100 WBCs      Neutrophils Relative 49 %      Immat GRANS % 0 %      Lymphocytes Relative 40 %      Monocytes Relative 6 %      Eosinophils Relative 4 %      Basophils Relative 1 %      Neutrophils Absolute 2 54 Thousands/µL      Immature Grans Absolute 0 01 Thousand/uL      Lymphocytes Absolute 2 06 Thousands/µL      Monocytes Absolute 0 32 Thousand/µL      Eosinophils Absolute 0 18 Thousand/µL      Basophils Absolute 0 04 Thousands/µL                  XR chest 1 view portable    (Results Pending)              Procedures  Procedures         ED Course             HEART Risk Score      Most Recent Value   Heart Score Risk Calculator   History  0 Filed at: 02/21/2021 1529   ECG  0 Filed at: 02/21/2021 1529   Age  2 Filed at: 02/21/2021 1529   Risk Factors  1 Filed at: 02/21/2021 1529   Troponin  0 Filed at: 02/21/2021 1529   HEART Score  3 Filed at: 02/21/2021 1529                                    MDM    Disposition  Final diagnoses:   Left arm pain     Time reflects when diagnosis was documented in both MDM as applicable and the Disposition within this note     Time User Action Codes Description Comment    2/21/2021  3:33 PM Juan Coleman Add [H88 902] Left arm pain       ED Disposition     ED Disposition Condition Date/Time Comment    Discharge Stable Sun Feb 21, 2021  3:33 PM Dl Childers discharge to home/self care  Follow-up Information     Follow up With Specialties Details Why Contact Info    Tracy Dolan MD Family Medicine Schedule an appointment as soon as possible for a visit  As needed [address]  [phone] [phone]  [phone]            Patient's Medications   Discharge Prescriptions    No medications on file     No discharge procedures on file      PDMP Review     None          ED Provider  Electronically Signed by           Marysol Rivera DO  02/21/21 1536

## 2021-02-21 NOTE — Clinical Note
Brenda Minor was seen and treated in our emergency department on 2/21/2021  Diagnosis:     Emmett Benton  may return to work on return date  She may return on this date: 02/23/2021         If you have any questions or concerns, please don't hesitate to call        Ramesh Walton DO    ______________________________           _______________          _______________  Hospital Representative                              Date                                Time

## 2021-02-22 ENCOUNTER — TELEPHONE (OUTPATIENT)
Dept: FAMILY MEDICINE CLINIC | Facility: CLINIC | Age: 66
End: 2021-02-22

## 2021-02-22 NOTE — TELEPHONE ENCOUNTER
182/84 this morning and 164/83 this afternoon  Patient feels anxious and having a muscle spasm    She would like to know if she can get some alprazolam   She had is prescribed before and used one before they

## 2021-02-22 NOTE — TELEPHONE ENCOUNTER
Tried to call pt and I was blocked and could not leave a message   She can double her carvedilol to 12 5 bid needs visit tomorrow

## 2021-02-22 NOTE — TELEPHONE ENCOUNTER
Went to ER yesterday afternoon, felt she pulled a muscle (chest) shoveling  Had EKG, chest xray etc were ok, but blood pressure was up  They told her to call you  Should she have a medication adjustment?

## 2021-02-25 ENCOUNTER — OFFICE VISIT (OUTPATIENT)
Dept: FAMILY MEDICINE CLINIC | Facility: CLINIC | Age: 66
End: 2021-02-25
Payer: COMMERCIAL

## 2021-02-25 VITALS
RESPIRATION RATE: 16 BRPM | BODY MASS INDEX: 24.81 KG/M2 | WEIGHT: 148.9 LBS | SYSTOLIC BLOOD PRESSURE: 152 MMHG | OXYGEN SATURATION: 98 % | HEART RATE: 76 BPM | DIASTOLIC BLOOD PRESSURE: 80 MMHG | TEMPERATURE: 97.7 F | HEIGHT: 65 IN

## 2021-02-25 DIAGNOSIS — F41.1 ANXIETY STATE: ICD-10-CM

## 2021-02-25 DIAGNOSIS — E78.5 DYSLIPIDEMIA: ICD-10-CM

## 2021-02-25 DIAGNOSIS — I10 ESSENTIAL HYPERTENSION: Primary | ICD-10-CM

## 2021-02-25 DIAGNOSIS — K57.90 DIVERTICULOSIS: ICD-10-CM

## 2021-02-25 PROCEDURE — 99214 OFFICE O/P EST MOD 30 MIN: CPT | Performed by: FAMILY MEDICINE

## 2021-02-25 RX ORDER — ALPRAZOLAM 0.25 MG/1
0.25 TABLET ORAL
Qty: 30 TABLET | Refills: 0 | Status: SHIPPED | OUTPATIENT
Start: 2021-02-25 | End: 2021-07-08 | Stop reason: SDUPTHER

## 2021-02-25 RX ORDER — CARVEDILOL 12.5 MG/1
12.5 TABLET ORAL 2 TIMES DAILY WITH MEALS
Qty: 60 TABLET | Refills: 5 | Status: SHIPPED | OUTPATIENT
Start: 2021-02-25 | End: 2021-06-17

## 2021-02-25 RX ORDER — VALSARTAN 320 MG/1
320 TABLET ORAL DAILY
Qty: 90 TABLET | Refills: 1 | Status: SHIPPED | OUTPATIENT
Start: 2021-02-25 | End: 2021-07-21

## 2021-02-25 NOTE — ASSESSMENT & PLAN NOTE
Pt was  In ER had eval for left arm pain cardiac eval was nl pt had very elev ated blood pressure valsartan doubled to 160mg and carvedilol to 12 5mg bid still with high readings pt very anxious now since mother had a stroke no chest pain pt was on xanax in past will increase valsartan to 320mg po qd follow up 1 week

## 2021-02-25 NOTE — PROGRESS NOTES
Assessment/Plan:         Problem List Items Addressed This Visit        Digestive    Diverticulosis     Stable  No recent flare ups            Cardiovascular and Mediastinum    Essential hypertension - Primary     Pt was  In ER had eval for left arm pain cardiac eval was nl pt had very elev ated blood pressure valsartan doubled to 160mg and carvedilol to 12 5mg bid still with high readings pt very anxious now since mother had a stroke no chest pain pt was on xanax in past will increase valsartan to 320mg po qd follow up 1 week         Relevant Medications    valsartan (DIOVAN) 320 MG tablet    carvedilol (COREG) 12 5 mg tablet       Other    Dyslipidemia     On lipitor labs good         Anxiety state     Will restart xanax prn           Relevant Medications    ALPRAZolam (XANAX) 0 25 mg tablet            Subjective:  Pt for Er follow  Up had very elevated blod pressure nowin  Increased valsartan 80mg to 160mg pt now on carvedilol 12,]  5mg po bid pt is very anxious since mother  of stroke  Wants xanax was on this before     Patient ID: Matt Cedillo is a 72 y o  female  HPI    The following portions of the patient's history were reviewed and updated as appropriate:   Past Medical History:  She has a past medical history of Diverticulitis of colon, Glaucoma, Hypercholesteremia, and Hypertension  ,  _______________________________________________________________________  Medical Problems:  does not have any pertinent problems on file ,  _______________________________________________________________________  Past Surgical History:   has a past surgical history that includes Tonsillectomy; Colonoscopy (10/11/2016); Mammo (historical) (2018,11/10/17); and Breast cyst excision (Right)  ,  _______________________________________________________________________  Family History:  family history includes Breast cancer in her paternal aunt;  Hyperlipidemia in her mother; Hypertension in her mother; Lung cancer in her father; No Known Problems in her brother, brother, maternal aunt, maternal grandfather, maternal grandmother, maternal uncle, paternal grandfather, paternal grandmother, and paternal uncle; Other in her mother; Stroke in her mother ,  _______________________________________________________________________  Social History:   reports that she has never smoked  She has never used smokeless tobacco  She reports current alcohol use of about 3 0 standard drinks of alcohol per week  She reports that she does not use drugs  ,  _______________________________________________________________________  Allergies:  has No Known Allergies     _______________________________________________________________________  Current Outpatient Medications   Medication Sig Dispense Refill    atorvastatin (LIPITOR) 10 mg tablet Take 1 tablet (10 mg total) by mouth daily 90 tablet 2    timolol (BETIMOL) 0 5 % ophthalmic solution Administer 1 drop to both eyes 2 (two) times a day       ALPRAZolam (XANAX) 0 25 mg tablet Take 1 tablet (0 25 mg total) by mouth daily at bedtime as needed for anxiety 30 tablet 0    carvedilol (COREG) 12 5 mg tablet Take 1 tablet (12 5 mg total) by mouth 2 (two) times a day with meals 60 tablet 5    valsartan (DIOVAN) 320 MG tablet Take 1 tablet (320 mg total) by mouth daily 90 tablet 1     No current facility-administered medications for this visit       _______________________________________________________________________  Review of Systems   Respiratory: Negative for cough, chest tightness and shortness of breath  Cardiovascular: Negative for chest pain, palpitations and leg swelling  Neurological: Negative for dizziness, weakness, light-headedness and headaches  Psychiatric/Behavioral: Negative for dysphoric mood  The patient is nervous/anxious            Objective:  Vitals:    02/25/21 1049 02/25/21 1106   BP: (!) 174/90 152/80   BP Location: Left arm    Patient Position: Sitting    Cuff Size: Adult    Pulse: 76    Resp: 16    Temp: 97 7 °F (36 5 °C)    SpO2: 98%    Weight: 67 5 kg (148 lb 14 4 oz)    Height: 5' 4 5" (1 638 m)      Body mass index is 25 16 kg/m²  Physical Exam  Vitals signs reviewed  Constitutional:       General: She is not in acute distress  Appearance: Normal appearance  She is well-developed  She is not ill-appearing  HENT:      Mouth/Throat:      Mouth: Mucous membranes are moist    Eyes:      Extraocular Movements: Extraocular movements intact  Conjunctiva/sclera: Conjunctivae normal       Pupils: Pupils are equal, round, and reactive to light  Neck:      Musculoskeletal: Normal range of motion and neck supple  Thyroid: No thyromegaly  Vascular: No carotid bruit  Cardiovascular:      Rate and Rhythm: Normal rate and regular rhythm  Pulses: Normal pulses  Heart sounds: Normal heart sounds  No murmur  Pulmonary:      Effort: Pulmonary effort is normal  No respiratory distress  Breath sounds: Normal breath sounds  Chest:      Chest wall: No tenderness  Abdominal:      General: Bowel sounds are normal  There is no distension  Palpations: Abdomen is soft  Tenderness: There is no abdominal tenderness  Lymphadenopathy:      Cervical: No cervical adenopathy  Skin:     General: Skin is warm and dry  Neurological:      General: No focal deficit present  Mental Status: She is alert and oriented to person, place, and time  Mental status is at baseline  Cranial Nerves: No cranial nerve deficit        Deep Tendon Reflexes: Reflexes normal    Psychiatric:         Mood and Affect: Mood normal          Behavior: Behavior normal

## 2021-02-28 LAB
ATRIAL RATE: 69 BPM
P AXIS: 40 DEGREES
PR INTERVAL: 184 MS
QRS AXIS: 42 DEGREES
QRSD INTERVAL: 78 MS
QT INTERVAL: 396 MS
QTC INTERVAL: 424 MS
T WAVE AXIS: 29 DEGREES
VENTRICULAR RATE: 69 BPM

## 2021-02-28 PROCEDURE — 93010 ELECTROCARDIOGRAM REPORT: CPT | Performed by: INTERNAL MEDICINE

## 2021-03-02 ENCOUNTER — HOSPITAL ENCOUNTER (OUTPATIENT)
Dept: GASTROENTEROLOGY | Facility: AMBULARY SURGERY CENTER | Age: 66
Setting detail: OUTPATIENT SURGERY
Discharge: HOME | End: 2021-03-02
Attending: INTERNAL MEDICINE

## 2021-03-04 ENCOUNTER — OFFICE VISIT (OUTPATIENT)
Dept: FAMILY MEDICINE CLINIC | Facility: CLINIC | Age: 66
End: 2021-03-04
Payer: COMMERCIAL

## 2021-03-04 VITALS
SYSTOLIC BLOOD PRESSURE: 154 MMHG | WEIGHT: 148 LBS | DIASTOLIC BLOOD PRESSURE: 86 MMHG | TEMPERATURE: 98.5 F | OXYGEN SATURATION: 96 % | RESPIRATION RATE: 16 BRPM | HEART RATE: 68 BPM | BODY MASS INDEX: 24.66 KG/M2 | HEIGHT: 65 IN

## 2021-03-04 DIAGNOSIS — F41.1 ANXIETY STATE: ICD-10-CM

## 2021-03-04 DIAGNOSIS — R94.31 ABNORMAL EKG: ICD-10-CM

## 2021-03-04 DIAGNOSIS — I10 ESSENTIAL HYPERTENSION: Primary | ICD-10-CM

## 2021-03-04 DIAGNOSIS — M25.561 ACUTE PAIN OF RIGHT KNEE: ICD-10-CM

## 2021-03-04 PROCEDURE — 99214 OFFICE O/P EST MOD 30 MIN: CPT | Performed by: FAMILY MEDICINE

## 2021-03-04 NOTE — PROGRESS NOTES
Assessment/Plan:         Problem List Items Addressed This Visit        Cardiovascular and Mediastinum    Essential hypertension - Primary     Better control can cut pill in 1/2 for evening dose            Other    Anxiety state     Coincides with increased bop  Pt to use xanax prn consider lexapro pt not interesed right now         Abnormal EKG     Compared to EKG dating back to 2013 no change  Had stress test neg          Acute pain of right knee     Only 1 day feels twinge only likely ligamental pt to use ice tid for 3-4 days                 Subjective:  Pt here for reevaluation of blood pressure readings at home are all nl and is low at 10 pm 90/50's sometimes  All readings in her journal are good  Pt also with 1 day of right knee pain thinks she may have twisted it no swelling able to walk pain is only m ild  Pt also concerned about abnormal EKG done in ER which states cannot rule out septal infarct  Pt had complete cardiac work up 5 yrs ago told all ok  Pt does have  Anxiety using xanax prn  bp elevates with anxiety      Patient ID: Tommy Ayala is a 72 y o  female  HPI    The following portions of the patient's history were reviewed and updated as appropriate:   Past Medical History:  She has a past medical history of Diverticulitis of colon, Glaucoma, Hypercholesteremia, and Hypertension  ,  _______________________________________________________________________  Medical Problems:  does not have any pertinent problems on file ,  _______________________________________________________________________  Past Surgical History:   has a past surgical history that includes Tonsillectomy; Colonoscopy (10/11/2016); Mammo (historical) (12/14/2018,11/10/17); and Breast cyst excision (Right)  ,  _______________________________________________________________________  Family History:  family history includes Breast cancer in her paternal aunt;  Hyperlipidemia in her mother; Hypertension in her mother; Lung cancer in her father; No Known Problems in her brother, brother, maternal aunt, maternal grandfather, maternal grandmother, maternal uncle, paternal grandfather, paternal grandmother, and paternal uncle; Other in her mother; Stroke in her mother ,  _______________________________________________________________________  Social History:   reports that she has never smoked  She has never used smokeless tobacco  She reports current alcohol use of about 3 0 standard drinks of alcohol per week  She reports that she does not use drugs  ,  _______________________________________________________________________  Allergies:  is allergic to bee venom     _______________________________________________________________________  Current Outpatient Medications   Medication Sig Dispense Refill    ALPRAZolam (XANAX) 0 25 mg tablet Take 1 tablet (0 25 mg total) by mouth daily at bedtime as needed for anxiety 30 tablet 0    atorvastatin (LIPITOR) 10 mg tablet Take 1 tablet (10 mg total) by mouth daily 90 tablet 2    carvedilol (COREG) 12 5 mg tablet Take 1 tablet (12 5 mg total) by mouth 2 (two) times a day with meals 60 tablet 5    timolol (BETIMOL) 0 5 % ophthalmic solution Administer 1 drop to both eyes 2 (two) times a day       valsartan (DIOVAN) 320 MG tablet Take 1 tablet (320 mg total) by mouth daily 90 tablet 1     No current facility-administered medications for this visit       _______________________________________________________________________  Review of Systems   Respiratory: Negative for cough, chest tightness and shortness of breath  Cardiovascular: Negative for chest pain, palpitations and leg swelling  Musculoskeletal: Positive for arthralgias (right knee pain)  Neurological: Negative for dizziness, weakness, light-headedness and headaches  Psychiatric/Behavioral: Negative for dysphoric mood  The patient is not nervous/anxious            Objective:  Vitals:    03/04/21 1047   BP: 154/86   BP Location: Left arm Patient Position: Sitting   Pulse: 68   Resp: 16   Temp: 98 5 °F (36 9 °C)   SpO2: 96%   Weight: 67 1 kg (148 lb)   Height: 5' 4 5" (1 638 m)     Body mass index is 25 01 kg/m²  Physical Exam  Constitutional:       General: She is not in acute distress  Appearance: Normal appearance  She is well-developed  She is not ill-appearing  Eyes:      Extraocular Movements: Extraocular movements intact  Pupils: Pupils are equal, round, and reactive to light  Cardiovascular:      Rate and Rhythm: Normal rate and regular rhythm  Pulses: Normal pulses  Heart sounds: No murmur  Pulmonary:      Effort: Pulmonary effort is normal       Breath sounds: Normal breath sounds  Musculoskeletal:         General: Tenderness (mil;d tenderness right upper medial knee) present  Neurological:      General: No focal deficit present  Mental Status: She is alert and oriented to person, place, and time  Mental status is at baseline  Psychiatric:         Mood and Affect: Mood normal          Behavior: Behavior normal          Thought Content: Thought content normal        BMI Counseling: Body mass index is 25 01 kg/m²  The BMI is above normal  Nutrition recommendations include 3-5 servings of fruits/vegetables daily, reducing fast food intake, consuming healthier snacks, decreasing soda and/or juice intake, moderation in carbohydrate intake and increasing intake of lean protein  Exercise recommendations include exercising 3-5 times per week

## 2021-03-08 ENCOUNTER — TELEPHONE (OUTPATIENT)
Dept: FAMILY MEDICINE CLINIC | Facility: CLINIC | Age: 66
End: 2021-03-08

## 2021-03-08 NOTE — TELEPHONE ENCOUNTER
FYI, patient called back and stated she saw a cardiologist in "2014"  Stated you were checking her old records

## 2021-04-07 ENCOUNTER — TELEPHONE (OUTPATIENT)
Dept: FAMILY MEDICINE CLINIC | Facility: CLINIC | Age: 66
End: 2021-04-07

## 2021-04-07 NOTE — TELEPHONE ENCOUNTER
Pt states she has a stiff neck for a few days she wants to know if she can take something over the counter if so what?  Or can something be called  In for her

## 2021-05-17 DIAGNOSIS — K57.92 DIVERTICULITIS: Primary | ICD-10-CM

## 2021-05-17 RX ORDER — CIPROFLOXACIN 500 MG/1
500 TABLET, FILM COATED ORAL EVERY 12 HOURS SCHEDULED
Qty: 14 TABLET | Refills: 0 | Status: SHIPPED | OUTPATIENT
Start: 2021-05-17 | End: 2021-05-24

## 2021-05-17 RX ORDER — METRONIDAZOLE 500 MG/1
500 TABLET ORAL 2 TIMES DAILY
COMMUNITY
End: 2021-05-17 | Stop reason: SDUPTHER

## 2021-05-17 RX ORDER — CIPROFLOXACIN 500 MG/1
500 TABLET, FILM COATED ORAL EVERY 12 HOURS SCHEDULED
COMMUNITY
End: 2021-05-17 | Stop reason: SDUPTHER

## 2021-05-17 RX ORDER — METRONIDAZOLE 500 MG/1
500 TABLET ORAL 2 TIMES DAILY
Qty: 14 TABLET | Refills: 0 | Status: SHIPPED | OUTPATIENT
Start: 2021-05-17 | End: 2021-05-24

## 2021-05-17 NOTE — TELEPHONE ENCOUNTER
Patient called stating she has the same symptoms as she always gets with diverticulitis  Bloating and gas and pain in groin area on left side  Wants to know if she can get something for it    [facility]

## 2021-05-17 NOTE — TELEPHONE ENCOUNTER
Ok for Cipro 500 mg bid #14 and flagyl 500 mg bid #14  Pt to call if sxs worsen or go to ER if severe

## 2021-05-20 ENCOUNTER — TELEPHONE (OUTPATIENT)
Dept: FAMILY MEDICINE CLINIC | Facility: CLINIC | Age: 66
End: 2021-05-20

## 2021-05-20 NOTE — TELEPHONE ENCOUNTER
On call doctor called in medication for her on Monday (When Dr Byron James was out)  Dr AL ordered Ciprofloxacin & Metronidazole for Diverticulitis, gas, bloating & pressure in groin  She feels she has a UTI, & wonders if the Cipro could possibly help that?

## 2021-05-24 ENCOUNTER — TELEPHONE (OUTPATIENT)
Dept: FAMILY MEDICINE CLINIC | Facility: CLINIC | Age: 66
End: 2021-05-24

## 2021-05-24 NOTE — TELEPHONE ENCOUNTER
This was done by on call   I dont know what is going on but if possible diverticulitis needs to be seen by a

## 2021-05-25 ENCOUNTER — OFFICE VISIT (OUTPATIENT)
Dept: FAMILY MEDICINE CLINIC | Facility: CLINIC | Age: 66
End: 2021-05-25
Payer: COMMERCIAL

## 2021-05-25 VITALS
TEMPERATURE: 97.7 F | HEIGHT: 65 IN | OXYGEN SATURATION: 98 % | BODY MASS INDEX: 24.49 KG/M2 | SYSTOLIC BLOOD PRESSURE: 168 MMHG | DIASTOLIC BLOOD PRESSURE: 72 MMHG | WEIGHT: 147 LBS | HEART RATE: 71 BPM

## 2021-05-25 DIAGNOSIS — K59.04 CHRONIC IDIOPATHIC CONSTIPATION: ICD-10-CM

## 2021-05-25 DIAGNOSIS — R14.0 BLOATING: Primary | ICD-10-CM

## 2021-05-25 DIAGNOSIS — G89.29 CHRONIC SUPRAPUBIC PAIN: ICD-10-CM

## 2021-05-25 DIAGNOSIS — I10 ESSENTIAL HYPERTENSION: ICD-10-CM

## 2021-05-25 DIAGNOSIS — R10.2 CHRONIC SUPRAPUBIC PAIN: ICD-10-CM

## 2021-05-25 LAB
SL AMB  POCT GLUCOSE, UA: NORMAL
SL AMB LEUKOCYTE ESTERASE,UA: NORMAL
SL AMB POCT BILIRUBIN,UA: NORMAL
SL AMB POCT BLOOD,UA: NORMAL
SL AMB POCT KETONES,UA: NORMAL
SL AMB POCT NITRITE,UA: NORMAL
SL AMB POCT PH,UA: 6
SL AMB POCT SPECIFIC GRAVITY,UA: 1.01
SL AMB POCT URINE PROTEIN: NORMAL
SL AMB POCT UROBILINOGEN: NORMAL

## 2021-05-25 PROCEDURE — 99214 OFFICE O/P EST MOD 30 MIN: CPT | Performed by: FAMILY MEDICINE

## 2021-05-25 PROCEDURE — 81002 URINALYSIS NONAUTO W/O SCOPE: CPT | Performed by: FAMILY MEDICINE

## 2021-05-25 NOTE — PROGRESS NOTES
Subjective:      Patient ID: Kim Gasca is a 72 y o  female  Suprapubic pain- states that it comes as twinges, states that she has   A history of diverticulosis and was recently treated with ciprofloxacin and Flagyl  I do not see diverticulitis on her history  Patient does state that she is concerned about her ovaries since she has never   Been pregnant and is aware increased risk of ovarian cancers     She had bouts of hard stool a week ago and recently has loose but formed stool  She also feels that she is extremely bloated  She has tried Gas-X with substantial relief  Chronic constipation:  She is uses MiraLax as needed     essential hypertension: She also states that she has white coat hypertension and her blood pressure is always elevated in the office today morning at home ambulate reading was 120 /70s      Past Medical History:   Diagnosis Date    Diverticulitis of colon     Glaucoma     Hypercholesteremia     Hypertension        Family History   Problem Relation Age of Onset    Hyperlipidemia Mother     Hypertension Mother     Other Mother         cerebrovascular accident   [place] Stroke Mother    [place] Lung cancer Father     No Known Problems Brother     No Known Problems Maternal Aunt     No Known Problems Maternal Uncle     Breast cancer Paternal Aunt     No Known Problems Paternal Uncle     No Known Problems Maternal Grandmother     No Known Problems Maternal Grandfather     No Known Problems Paternal Grandmother     No Known Problems Paternal Grandfather     No Known Problems Brother     ADD / ADHD Neg Hx     Anesthesia problems Neg Hx     Cancer Neg Hx     Clotting disorder Neg Hx     Collagen disease Neg Hx     Diabetes Neg Hx     Dislocations Neg Hx     Learning disabilities Neg Hx     Neurological problems Neg Hx     Osteoporosis Neg Hx     Rheumatologic disease Neg Hx     Scoliosis Neg Hx     Vascular Disease Neg Hx        Past Surgical History:   Procedure Laterality Date    BREAST CYST EXCISION Right     benign - 2000 approx   COLONOSCOPY  10/11/2016    MAMMO (HISTORICAL)  12/14/2018,11/10/17    TONSILLECTOMY          reports that she has never smoked  She has never used smokeless tobacco  She reports previous alcohol use of about 3 0 standard drinks of alcohol per week  She reports that she does not use drugs  Current Outpatient Medications:     ALPRAZolam (XANAX) 0 25 mg tablet, Take 1 tablet (0 25 mg total) by mouth daily at bedtime as needed for anxiety, Disp: 30 tablet, Rfl: 0    atorvastatin (LIPITOR) 10 mg tablet, Take 1 tablet (10 mg total) by mouth daily, Disp: 90 tablet, Rfl: 2    carvedilol (COREG) 12 5 mg tablet, Take 1 tablet (12 5 mg total) by mouth 2 (two) times a day with meals, Disp: 60 tablet, Rfl: 5    timolol (BETIMOL) 0 5 % ophthalmic solution, Administer 1 drop to both eyes 2 (two) times a day , Disp: , Rfl:     valsartan (DIOVAN) 320 MG tablet, Take 1 tablet (320 mg total) by mouth daily, Disp: 90 tablet, Rfl: 1    The following portions of the patient's history were reviewed and updated as appropriate: allergies, current medications, past family history, past medical history, past social history, past surgical history and problem list     Review of Systems   Constitutional: Negative for fatigue and fever  HENT: Negative for congestion, facial swelling, mouth sores, rhinorrhea, sore throat and trouble swallowing  Eyes: Negative for pain and redness  Respiratory: Negative for cough, shortness of breath and wheezing  Cardiovascular: Negative for chest pain, palpitations and leg swelling  Gastrointestinal: Positive for abdominal pain, constipation and diarrhea  Negative for blood in stool and nausea  Bloating positive   Genitourinary: Negative for dysuria, hematuria and urgency  Musculoskeletal: Negative for arthralgias, back pain and myalgias  Skin: Negative for rash and wound     Neurological: Negative for seizures, syncope and headaches  Hematological: Negative for adenopathy  Psychiatric/Behavioral: Negative for agitation and behavioral problems  Objective:    /72 (BP Location: Left arm, Patient Position: Sitting, Cuff Size: Standard)   Pulse 71   Temp 97 7 °F (36 5 °C) (Temporal)   Ht 5' 4 5" (1 638 m)   Wt 66 7 kg (147 lb)   SpO2 98%   BMI 24 84 kg/m²      Physical Exam  Vitals signs and nursing note reviewed  Constitutional:       Appearance: Normal appearance  She is well-developed  HENT:      Head: Normocephalic and atraumatic  Right Ear: External ear normal       Left Ear: External ear normal       Nose: Nose normal    Eyes:      General: No scleral icterus  Right eye: No discharge  Left eye: No discharge  Conjunctiva/sclera: Conjunctivae normal       Pupils: Pupils are equal, round, and reactive to light  Neck:      Musculoskeletal: Normal range of motion and neck supple  Thyroid: No thyromegaly  Cardiovascular:      Rate and Rhythm: Normal rate and regular rhythm  Heart sounds: Normal heart sounds  No murmur  No gallop  Pulmonary:      Effort: Pulmonary effort is normal       Breath sounds: Normal breath sounds  No wheezing or rales  Abdominal:      General: There is no distension  Palpations: Abdomen is soft  Tenderness: There is abdominal tenderness (Suprapubic positive)  Musculoskeletal:         General: No tenderness or deformity  Lymphadenopathy:      Cervical: No cervical adenopathy  Skin:     Findings: No erythema or rash  Neurological:      General: No focal deficit present  Mental Status: She is alert  Mental status is at baseline     Psychiatric:         Mood and Affect: Mood normal          Behavior: Behavior normal            Recent Results (from the past 2520 hour(s))   ECG 12 lead    Collection Time: 02/21/21  2:00 PM   Result Value Ref Range    Ventricular Rate 69 BPM    Atrial Rate 69 BPM    MI Interval 184 ms    QRSD Interval 78 ms    QT Interval 396 ms    QTC Interval 424 ms    P Axis 40 degrees    QRS Axis 42 degrees    T Wave Axis 29 degrees   CBC and differential    Collection Time: 02/21/21  2:23 PM   Result Value Ref Range    WBC 5 15 4 31 - 10 16 Thousand/uL    RBC 4 52 3 81 - 5 12 Million/uL    Hemoglobin 13 5 11 5 - 15 4 g/dL    Hematocrit 42 3 34 8 - 46 1 %    MCV 94 82 - 98 fL    MCH 29 9 26 8 - 34 3 pg    MCHC 31 9 31 4 - 37 4 g/dL    RDW 12 2 11 6 - 15 1 %    MPV 9 6 8 9 - 12 7 fL    Platelets 425 762 - 240 Thousands/uL    nRBC 0 /100 WBCs    Neutrophils Relative 49 43 - 75 %    Immat GRANS % 0 0 - 2 %    Lymphocytes Relative 40 14 - 44 %    Monocytes Relative 6 4 - 12 %    Eosinophils Relative 4 0 - 6 %    Basophils Relative 1 0 - 1 %    Neutrophils Absolute 2 54 1 85 - 7 62 Thousands/µL    Immature Grans Absolute 0 01 0 00 - 0 20 Thousand/uL    Lymphocytes Absolute 2 06 0 60 - 4 47 Thousands/µL    Monocytes Absolute 0 32 0 17 - 1 22 Thousand/µL    Eosinophils Absolute 0 18 0 00 - 0 61 Thousand/µL    Basophils Absolute 0 04 0 00 - 0 10 Thousands/µL   Protime-INR    Collection Time: 02/21/21  2:23 PM   Result Value Ref Range    Protime 13 2 11 6 - 14 5 seconds    INR 1 01 0 84 - 1 19   APTT    Collection Time: 02/21/21  2:23 PM   Result Value Ref Range    PTT 29 23 - 37 seconds   Comprehensive metabolic panel    Collection Time: 02/21/21  2:23 PM   Result Value Ref Range    Sodium 138 136 - 145 mmol/L    Potassium 4 0 3 5 - 5 3 mmol/L    Chloride 101 100 - 108 mmol/L    CO2 29 21 - 32 mmol/L    ANION GAP 8 4 - 13 mmol/L    BUN 14 5 - 25 mg/dL    Creatinine 0 82 0 60 - 1 30 mg/dL    Glucose 109 65 - 140 mg/dL    Calcium 9 2 8 3 - 10 1 mg/dL    AST 24 5 - 45 U/L    ALT 48 12 - 78 U/L    Alkaline Phosphatase 53 46 - 116 U/L    Total Protein 7 7 6 4 - 8 2 g/dL    Albumin 4 0 3 5 - 5 0 g/dL    Total Bilirubin 0 50 0 20 - 1 00 mg/dL    eGFR 75 ml/min/1 73sq m   Troponin I    Collection Time: 02/21/21  2:23 PM   Result Value Ref Range    Troponin I <0 02 <=0 04 ng/mL   D-Dimer    Collection Time: 02/21/21  2:23 PM   Result Value Ref Range    D-Dimer, Quant <0 27 <0 50 ug/ml FEU   NT-BNP PRO    Collection Time: 02/21/21  2:23 PM   Result Value Ref Range    NT-proBNP 276 (H) <125 pg/mL   UA (URINE) with reflex to Scope    Collection Time: 02/21/21  3:26 PM   Result Value Ref Range    Color, UA Yellow     Clarity, UA Clear     Specific [place], UA 1 015 1 000 - 1 030    pH, UA 6 0 5 0, 5 5, 6 0, 6 5, 7 0, 7 5, 8 0, 8 5, 9 0    Leukocytes, UA Negative Negative    Nitrite, UA Negative Negative    Protein, UA Negative Negative mg/dl    Glucose, UA Negative Negative mg/dl    Ketones, UA Negative Negative mg/dl    Urobilinogen, UA 0 2 0 2, 1 0 E U /dl E U /dl    Bilirubin, UA Negative Negative    Blood, UA Negative Negative   POCT urine dip    Collection Time: 05/25/21  3:11 PM   Result Value Ref Range    LEUKOCYTE ESTERASE,UA NEG     NITRITE,UA NEG     SL AMB POCT UROBILINOGEN NEG     POCT URINE PROTEIN NEG      PH,UA 6 0     BLOOD,UA NEG     SPECIFIC GRAVITY,UA 1 010     KETONES,UA NEG     BILIRUBIN,UA NEG     GLUCOSE, UA NEG            Assessment/Plan:         Diagnoses and all orders for this visit:    Bloating  -     POCT urine dip    Chronic suprapubic pain  -     US abdomen complete; Future    Essential hypertension    Chronic idiopathic constipation            I do recommend ultrasound of the abdomen at this time given chronic suprapubic pain going on for few weeks now  Her urine dipstick was negative  I have advised her to continue MiraLax daily, and told her to increase her fluid intake and fiber intake  for constipation and do feel that her diarrhea would have been overflow diarrhea and not diverticulitis  If this were a true urinary tract infection it would have been treated with the ciprofloxacin that she recently used      She does have white coat hypertension as the blood pressure is fine at home   I have advised her to continue Coreg and valsartan and follow-up with Dr Antonietta Delgado  Patient was given opportunity to ask questions and all questions were answered

## 2021-06-02 ENCOUNTER — HOSPITAL ENCOUNTER (OUTPATIENT)
Dept: RADIOLOGY | Facility: HOSPITAL | Age: 66
Discharge: HOME | End: 2021-06-02
Payer: COMMERCIAL

## 2021-06-02 DIAGNOSIS — R10.2 CHRONIC SUPRAPUBIC PAIN: ICD-10-CM

## 2021-06-02 DIAGNOSIS — G89.29 CHRONIC SUPRAPUBIC PAIN: ICD-10-CM

## 2021-06-02 PROCEDURE — 76700 US EXAM ABDOM COMPLETE: CPT

## 2021-06-07 ENCOUNTER — TELEPHONE (OUTPATIENT)
Dept: FAMILY MEDICINE CLINIC | Facility: CLINIC | Age: 66
End: 2021-06-07

## 2021-06-08 ENCOUNTER — TELEPHONE (OUTPATIENT)
Dept: FAMILY MEDICINE CLINIC | Facility: CLINIC | Age: 66
End: 2021-06-08

## 2021-06-11 NOTE — PROGRESS NOTES
Assessment/Plan:         Problem List Items Addressed This Visit        Digestive    Diverticulosis     Non tender today pt has scheduled  colonoscopy         Fatty liver     Diet and exercise  To maintain ideal body weight last LFTs nl            Cardiovascular and Mediastinum    Essential hypertension - Primary     Has had some high readings will increase carvedilol to 25mg po bid follow up 1 mo         Relevant Medications    carvedilol (COREG) 6 25 mg tablet       Other    Dyslipidemia     Ok on meds         Abnormal EKG    Relevant Orders    Ambulatory referral to Cardiology    Family history of myocardial infarction      Pt wishes cardiac evaluation                 Subjective:  Pt here to discuss diverticulitis was recently treated and feels well pt has had elevated blood pressure readings at home  Wishes to see a cardiologist since has family hx of heart disease and did have abnormality on EKG in ER   Went to ER for chest pain and told muscular which she still gets on off     Patient ID: William Santiago is a 72 y o  female  HPI    The following portions of the patient's history were reviewed and updated as appropriate:   Past Medical History:  She has a past medical history of Diverticulitis of colon, Fatty liver (6/17/2021), Glaucoma, Hypercholesteremia, and Hypertension  ,  _______________________________________________________________________  Medical Problems:  does not have any pertinent problems on file ,  _______________________________________________________________________  Past Surgical History:   has a past surgical history that includes Tonsillectomy; Colonoscopy (10/11/2016); Mammo (historical) (12/14/2018,11/10/17); and Breast cyst excision (Right)  ,  _______________________________________________________________________  Family History:  family history includes Breast cancer in her paternal aunt; Hyperlipidemia in her mother; Hypertension in her mother; Lung cancer in her father;  No Known Problems in her brother, brother, maternal aunt, maternal grandfather, maternal grandmother, maternal uncle, paternal grandfather, paternal grandmother, and paternal uncle; Other in her mother; Stroke in her mother ,  _______________________________________________________________________  Social History:   reports that she has never smoked  She has never used smokeless tobacco  She reports previous alcohol use of about 3 0 standard drinks of alcohol per week  She reports that she does not use drugs  ,  _______________________________________________________________________  Allergies:  is allergic to bee venom     _______________________________________________________________________  Current Outpatient Medications   Medication Sig Dispense Refill    ALPRAZolam (XANAX) 0 25 mg tablet Take 1 tablet (0 25 mg total) by mouth daily at bedtime as needed for anxiety 30 tablet 0    atorvastatin (LIPITOR) 10 mg tablet Take 1 tablet (10 mg total) by mouth daily 90 tablet 2    timolol (BETIMOL) 0 5 % ophthalmic solution Administer 1 drop to both eyes 2 (two) times a day       valsartan (DIOVAN) 320 MG tablet Take 1 tablet (320 mg total) by mouth daily 90 tablet 1    carvedilol (COREG) 6 25 mg tablet Take 1 tablet (6 25 mg total) by mouth 2 (two) times a day with meals 180 tablet 3     No current facility-administered medications for this visit      _______________________________________________________________________  Review of Systems   Constitutional: Negative for appetite change, chills, fatigue and fever  Respiratory: Negative for cough, chest tightness and shortness of breath  Cardiovascular: Positive for chest pain (chest wall tenderness to touvh left upper near left shoulder)  Negative for palpitations and leg swelling  Gastrointestinal: Negative for abdominal distention, abdominal pain, constipation, diarrhea, nausea and vomiting  Genitourinary: Negative for difficulty urinating and frequency  Musculoskeletal: Negative for arthralgias, back pain and neck pain  Skin: Negative for rash  Neurological: Negative for dizziness, weakness, light-headedness, numbness and headaches  Hematological: Does not bruise/bleed easily  Psychiatric/Behavioral: Negative for dysphoric mood and sleep disturbance  The patient is not nervous/anxious  Objective:  Vitals:    06/17/21 1424   BP: 170/80   BP Location: Left arm   Patient Position: Sitting   Cuff Size: Adult   Pulse: 71   Resp: 16   Temp: 97 7 °F (36 5 °C)   TempSrc: Temporal   SpO2: 98%   Weight: 65 3 kg (144 lb)   Height: 5' 4 5" (1 638 m)     Body mass index is 24 34 kg/m²  Physical Exam  Vitals reviewed  Constitutional:       General: She is not in acute distress  Appearance: Normal appearance  She is well-developed  She is not ill-appearing  HENT:      Mouth/Throat:      Mouth: Mucous membranes are moist    Eyes:      Extraocular Movements: Extraocular movements intact  Conjunctiva/sclera: Conjunctivae normal       Pupils: Pupils are equal, round, and reactive to light  Neck:      Thyroid: No thyromegaly  Vascular: No carotid bruit  Cardiovascular:      Rate and Rhythm: Normal rate and regular rhythm  Pulses: Normal pulses  Heart sounds: Normal heart sounds  No murmur heard  Pulmonary:      Effort: Pulmonary effort is normal  No respiratory distress  Breath sounds: Normal breath sounds  Chest:      Chest wall: No tenderness  Abdominal:      General: Bowel sounds are normal  There is no distension  Palpations: Abdomen is soft  There is no mass  Tenderness: There is no abdominal tenderness  There is no guarding  Musculoskeletal:         General: Tenderness (mild tenderness left upper chest near left shoulder) present  Cervical back: Normal range of motion and neck supple  Lymphadenopathy:      Cervical: No cervical adenopathy  Skin:     General: Skin is warm and dry  Neurological:      General: No focal deficit present  Mental Status: She is alert and oriented to person, place, and time  Mental status is at baseline  Cranial Nerves: No cranial nerve deficit        Deep Tendon Reflexes: Reflexes normal    Psychiatric:         Mood and Affect: Mood normal          Behavior: Behavior normal

## 2021-06-17 ENCOUNTER — OFFICE VISIT (OUTPATIENT)
Dept: FAMILY MEDICINE CLINIC | Facility: CLINIC | Age: 66
End: 2021-06-17
Payer: COMMERCIAL

## 2021-06-17 VITALS
SYSTOLIC BLOOD PRESSURE: 170 MMHG | WEIGHT: 144 LBS | DIASTOLIC BLOOD PRESSURE: 80 MMHG | OXYGEN SATURATION: 98 % | HEIGHT: 65 IN | RESPIRATION RATE: 16 BRPM | HEART RATE: 71 BPM | TEMPERATURE: 97.7 F | BODY MASS INDEX: 23.99 KG/M2

## 2021-06-17 DIAGNOSIS — R94.31 ABNORMAL EKG: ICD-10-CM

## 2021-06-17 DIAGNOSIS — Z82.49 FAMILY HISTORY OF MYOCARDIAL INFARCTION: ICD-10-CM

## 2021-06-17 DIAGNOSIS — I10 ESSENTIAL HYPERTENSION: Primary | ICD-10-CM

## 2021-06-17 DIAGNOSIS — E78.5 DYSLIPIDEMIA: ICD-10-CM

## 2021-06-17 DIAGNOSIS — K76.0 FATTY LIVER: ICD-10-CM

## 2021-06-17 DIAGNOSIS — K57.90 DIVERTICULOSIS: ICD-10-CM

## 2021-06-17 PROCEDURE — 99214 OFFICE O/P EST MOD 30 MIN: CPT | Performed by: FAMILY MEDICINE

## 2021-06-17 RX ORDER — CARVEDILOL 25 MG/1
25 TABLET ORAL 2 TIMES DAILY WITH MEALS
Qty: 60 TABLET | Refills: 5 | Status: SHIPPED | OUTPATIENT
Start: 2021-06-17 | End: 2021-06-17 | Stop reason: DRUGHIGH

## 2021-06-17 RX ORDER — CARVEDILOL 6.25 MG/1
6.25 TABLET ORAL 2 TIMES DAILY WITH MEALS
Qty: 180 TABLET | Refills: 3 | Status: SHIPPED | OUTPATIENT
Start: 2021-06-17 | End: 2021-07-08

## 2021-06-23 ENCOUNTER — TELEPHONE (OUTPATIENT)
Dept: FAMILY MEDICINE CLINIC | Facility: CLINIC | Age: 66
End: 2021-06-23

## 2021-06-23 DIAGNOSIS — L23.7 POISON IVY: Primary | ICD-10-CM

## 2021-06-23 RX ORDER — PREDNISONE 10 MG/1
TABLET ORAL DAILY
COMMUNITY
End: 2021-06-23 | Stop reason: SDUPTHER

## 2021-06-23 NOTE — TELEPHONE ENCOUNTER
Was weeding & has poison ivy all over face & also on one eye lid  Oozy & itchy  Asking if she can take Benedryl? She is leaving her house at 10 for an eye doctor appt

## 2021-06-23 NOTE — TELEPHONE ENCOUNTER
Recommend prednisone taper 10mg  tabs 40mg po qd x3  30mg po qd x 3 20mg po qd x 3 and 10mg po qd x 3 days #30 take claritin or allegra daily and benadryl at night

## 2021-06-27 RX ORDER — PREDNISONE 10 MG/1
TABLET ORAL
Qty: 30 TABLET | Refills: 0 | Status: SHIPPED | OUTPATIENT
Start: 2021-06-27 | End: 2021-07-21

## 2021-06-29 ENCOUNTER — TELEPHONE (OUTPATIENT)
Dept: GASTROENTEROLOGY | Facility: CLINIC | Age: 66
End: 2021-06-29

## 2021-06-29 DIAGNOSIS — R10.32 LEFT LOWER QUADRANT ABDOMINAL PAIN: Primary | ICD-10-CM

## 2021-06-29 NOTE — TELEPHONE ENCOUNTER
Called patient, advised to do clear liquid diet for next 2 days  Ordered Stat CT abdomen and pelvis for LLQ abdomen pain , diverticulitis  Also order stat BUN and creatinine for CT study  Patient does not have a car but will call and schedule CT for tomorrow  If CT positive for diverticulitis will treat with antibiotics  Thank you

## 2021-06-29 NOTE — TELEPHONE ENCOUNTER
Pt called and informed she has hx of diverticulitis and is now having diarrhea and lower abd groin pain  I have her scheduled for an appt with Dr Piyush Mccloud on 7/6/21  Pt is asking if there is anything she should do in the meantime  Please call her back at [phone]  Thank you so much!

## 2021-06-30 ENCOUNTER — APPOINTMENT (OUTPATIENT)
Dept: LAB | Facility: HOSPITAL | Age: 66
End: 2021-06-30
Payer: COMMERCIAL

## 2021-06-30 ENCOUNTER — HOSPITAL ENCOUNTER (OUTPATIENT)
Dept: RADIOLOGY | Facility: HOSPITAL | Age: 66
Discharge: HOME | End: 2021-06-30
Payer: COMMERCIAL

## 2021-06-30 DIAGNOSIS — R10.32 LEFT LOWER QUADRANT ABDOMINAL PAIN: ICD-10-CM

## 2021-06-30 LAB
BACTERIA UR QL AUTO: ABNORMAL /HPF
BILIRUB UR QL STRIP: NEGATIVE
BUN SERPL-MCNC: 9 MG/DL (ref 5–25)
CLARITY UR: CLEAR
COLOR UR: YELLOW
CREAT SERPL-MCNC: 0.62 MG/DL (ref 0.6–1.3)
GFR SERPL CREATININE-BSD FRML MDRD: 95 ML/MIN/1.73SQ M
GLUCOSE UR STRIP-MCNC: NEGATIVE MG/DL
HGB UR QL STRIP.AUTO: NEGATIVE
KETONES UR STRIP-MCNC: NEGATIVE MG/DL
LEUKOCYTE ESTERASE UR QL STRIP: NEGATIVE
NITRITE UR QL STRIP: NEGATIVE
NON-SQ EPI CELLS URNS QL MICRO: ABNORMAL /HPF
PH UR STRIP.AUTO: 7 [PH]
PROT UR STRIP-MCNC: NEGATIVE MG/DL
RBC #/AREA URNS AUTO: ABNORMAL /HPF
SP GR UR STRIP.AUTO: 1.01 (ref 1–1.03)
UROBILINOGEN UR QL STRIP.AUTO: 1 E.U./DL
WBC #/AREA URNS AUTO: ABNORMAL /HPF

## 2021-06-30 PROCEDURE — 81001 URINALYSIS AUTO W/SCOPE: CPT | Performed by: FAMILY MEDICINE

## 2021-06-30 PROCEDURE — 36415 COLL VENOUS BLD VENIPUNCTURE: CPT

## 2021-06-30 PROCEDURE — 74177 CT ABD & PELVIS W/CONTRAST: CPT

## 2021-06-30 PROCEDURE — G1004 CDSM NDSC: HCPCS

## 2021-06-30 PROCEDURE — 82565 ASSAY OF CREATININE: CPT

## 2021-06-30 PROCEDURE — 84520 ASSAY OF UREA NITROGEN: CPT

## 2021-06-30 RX ADMIN — IOHEXOL 100 ML: 350 INJECTION, SOLUTION INTRAVENOUS at 13:29

## 2021-07-02 NOTE — PROGRESS NOTES
Assessment/Plan:         Problem List Items Addressed This Visit        Cardiovascular and Mediastinum    Essential hypertension - Primary     Ok on meds            Musculoskeletal and Integument    Poison ivy     Pt  With mild rash on  Face after prednisone taper to start daily claritin for 1 month benadryl prn            Other    Anxiety state    Generalized anxiety disorder     Would recommend starting on lexapro 10mg po qd side effects and warnings given follow up 3 weeks                  Subjective: pt here for  reevaluation of htn readings at home ok  Pt also with  worsening anxiety will be going through a divorce pt still with some itching rash on face just finished prednisone taper called in for poison ivy     Patient ID: Diana Dougherty is a 72 y o  female  HPI    The following portions of the patient's history were reviewed and updated as appropriate:   Past Medical History:  She has a past medical history of Diverticulitis of colon, Fatty liver (6/17/2021), Glaucoma, Hypercholesteremia, and Hypertension  ,  _______________________________________________________________________  Medical Problems:  does not have any pertinent problems on file ,  _______________________________________________________________________  Past Surgical History:   has a past surgical history that includes Tonsillectomy; Colonoscopy (10/11/2016); Mammo (historical) (12/14/2018,11/10/17); and Breast cyst excision (Right)  ,  _______________________________________________________________________  Family History:  family history includes Breast cancer in her paternal aunt; Hyperlipidemia in her mother; Hypertension in her mother; Lung cancer in her father; No Known Problems in her brother, brother, maternal aunt, maternal grandfather, maternal grandmother, maternal uncle, paternal grandfather, paternal grandmother, and paternal uncle;  Other in her mother; Stroke in her mother ,  _______________________________________________________________________  Social History:   reports that she has never smoked  She has never used smokeless tobacco  She reports current alcohol use of about 3 0 standard drinks of alcohol per week  She reports that she does not use drugs  ,  _______________________________________________________________________  Allergies:  is allergic to bee venom     _______________________________________________________________________  Current Outpatient Medications   Medication Sig Dispense Refill    ALPRAZolam (XANAX) 0 25 mg tablet Take 1 tablet (0 25 mg total) by mouth daily at bedtime as needed for anxiety 30 tablet 0    atorvastatin (LIPITOR) 10 mg tablet Take 1 tablet (10 mg total) by mouth daily 90 tablet 2    timolol (BETIMOL) 0 5 % ophthalmic solution Administer 1 drop to both eyes 2 (two) times a day       valsartan (DIOVAN) 320 MG tablet Take 1 tablet (320 mg total) by mouth daily 90 tablet 1    predniSONE 10 mg tablet Take 4 tabs by mouth for 3 days, then take 3 tabs by mouth for 3 days, then 2 tabs by mouth for 3 days and then 1 tab by mouth for 3 days  (Patient not taking: Reported on 7/6/2021) 30 tablet 0    tobramycin-dexamethasone (TOBRADEX) ophthalmic suspension PLACE 1 DROP INTO  BOTH EYES 4 TIMES A DAY FOR 1 WEEK THEN TWICE A DAY FOR 1 WEEK THEN STOP (Patient not taking: Reported on 7/8/2021)       No current facility-administered medications for this visit      _______________________________________________________________________  Review of Systems   Respiratory: Negative for cough, chest tightness and shortness of breath  Cardiovascular: Negative for chest pain, palpitations and leg swelling  Skin: Positive for rash  Neurological: Negative for dizziness, weakness, light-headedness and headaches  Psychiatric/Behavioral: Negative for dysphoric mood  The patient is nervous/anxious            Objective:  Vitals:    07/08/21 1306 07/08/21 1320   BP: 160/90 136/80   BP Location: Left arm    Patient Position: Sitting    Pulse: 80    Resp: 16    Temp: 97 5 °F (36 4 °C)    SpO2: 97%    Weight: 64 4 kg (142 lb)    Height: 5' 4 5" (1 638 m)      Body mass index is 24 kg/m²  Physical Exam  Constitutional:       General: She is not in acute distress  Appearance: Normal appearance  She is well-developed  She is obese  She is not ill-appearing  Eyes:      Extraocular Movements: Extraocular movements intact  Pupils: Pupils are equal, round, and reactive to light  Cardiovascular:      Rate and Rhythm: Normal rate and regular rhythm  Pulses: Normal pulses  Heart sounds: Normal heart sounds  No murmur heard  Pulmonary:      Effort: Pulmonary effort is normal       Breath sounds: Normal breath sounds  Musculoskeletal:      Right lower leg: No edema  Left lower leg: No edema  Skin:     Findings: Rash (few small red papules on cheeks) present  Neurological:      General: No focal deficit present  Mental Status: She is alert and oriented to person, place, and time  Mental status is at baseline  Psychiatric:         Mood and Affect: Mood normal          Behavior: Behavior normal          Thought Content:  Thought content normal

## 2021-07-06 ENCOUNTER — OFFICE VISIT (OUTPATIENT)
Dept: GASTROENTEROLOGY | Facility: CLINIC | Age: 66
End: 2021-07-06
Payer: COMMERCIAL

## 2021-07-06 VITALS
SYSTOLIC BLOOD PRESSURE: 164 MMHG | WEIGHT: 145 LBS | HEART RATE: 80 BPM | DIASTOLIC BLOOD PRESSURE: 96 MMHG | BODY MASS INDEX: 24.5 KG/M2

## 2021-07-06 DIAGNOSIS — R16.0 HEPATOMEGALY: Primary | ICD-10-CM

## 2021-07-06 DIAGNOSIS — K58.0 IRRITABLE BOWEL SYNDROME WITH DIARRHEA: ICD-10-CM

## 2021-07-06 DIAGNOSIS — K57.90 DIVERTICULOSIS: ICD-10-CM

## 2021-07-06 PROCEDURE — 99204 OFFICE O/P NEW MOD 45 MIN: CPT | Performed by: INTERNAL MEDICINE

## 2021-07-06 RX ORDER — TOBRAMYCIN AND DEXAMETHASONE 3; 1 MG/ML; MG/ML
SUSPENSION/ DROPS OPHTHALMIC
COMMUNITY
Start: 2021-06-23 | End: 2021-07-21

## 2021-07-06 NOTE — PROGRESS NOTES
[facility] - Outpatient Consultation  Go Andres 72 y o  female MRN: [id number]  Encounter: [id number]          ASSESSMENT AND PLAN:      1  Hepatomegaly  Patient had ultrasound recently and there is a question of mild hepatomegaly  On examination today her liver edge is about 2-3 fingers below costal margin  Patient does not have any significant history of alcoholism however she has been drinking beer about 3-4 a day over the long run  There is no other abnormal history available  CMP reviewed and liver functions are normal   Spleen is not palpable  I have reviewed the findings of CT scan and ultrasounds done over the last six months  Borderline elevation of blood sugar noted  Cannot exclude some contribution from fatty liver  2  Irritable bowel syndrome with diarrhea    Patient has diarrhea and change in bowel habits  Usually she is constipated  Recently she is going to the bathroom about three to 4 times a day  She denies any rectal bleeding  She had abdominal pain for which CT scan of the abdomen was ordered for evaluation of diverticulitis  There was diverticulosis without any evidence of diverticulitis  Patient denies any significant rectal bleeding  3  Diverticulosis    Patient has history of diverticulosis and diverticulitis  She had an attack of diverticulitis about two years ago  She recently had lower abdominal and left lower quadrant abdominal pain  She denies any dysuria or hematuria  There is no other significant bowel related problem  ______________________________________________________________________    HPI:   Denies any fevers or chills  Denies any chest pain, cough or wheezing  Denies any palpitation, orthopnea or exertional dyspnea  Denies any dysuria hematuria  There is no history of stroke, CVA or seizures  REVIEW OF SYSTEMS:    CONSTITUTIONAL: Denies any fever, chills, rigors, and weight loss    HEENT: No earache or tinnitus  Denies hearing loss or visual disturbances  CARDIOVASCULAR: No chest pain or palpitations  RESPIRATORY: Denies any cough, hemoptysis, shortness of breath or dyspnea on exertion  GASTROINTESTINAL: As noted in the History of Present Illness  GENITOURINARY: No problems with urination  Denies any hematuria or dysuria  NEUROLOGIC: No dizziness or vertigo, denies headaches  MUSCULOSKELETAL: Denies any muscle or joint pain  SKIN: Denies skin rashes or itching  ENDOCRINE: Denies excessive thirst  Denies intolerance to heat or cold  PSYCHOSOCIAL: Denies depression or anxiety  Denies any recent memory loss  Historical Information   Past Medical History:   Diagnosis Date    Diverticulitis of colon     Fatty liver 6/17/2021    Glaucoma     Hypercholesteremia     Hypertension      Past Surgical History:   Procedure Laterality Date    BREAST CYST EXCISION Right     benign - 2000 approx      COLONOSCOPY  10/11/2016    MAMMO (HISTORICAL)  12/14/2018,11/10/17    TONSILLECTOMY       Social History   Social History     Substance and Sexual Activity   Alcohol Use Not Currently    Alcohol/week: 3 0 standard drinks    Types: 3 Cans of beer per week     Social History     Substance and Sexual Activity   Drug Use Never     Social History     Tobacco Use   Smoking Status Never Smoker   Smokeless Tobacco Never Used     Family History   Problem Relation Age of Onset    Hyperlipidemia Mother     Hypertension Mother     Other Mother         cerebrovascular accident   Yenny Parks Stroke Mother    Yenny Parks Lung cancer Father     No Known Problems Brother     No Known Problems Maternal Aunt     No Known Problems Maternal Uncle     Breast cancer Paternal Aunt     No Known Problems Paternal Uncle     No Known Problems Maternal Grandmother     No Known Problems Maternal Grandfather     No Known Problems Paternal Grandmother     No Known Problems Paternal Grandfather     No Known Problems Brother     ADD / ADHD Neg Hx     Anesthesia problems Neg Hx     Cancer Neg Hx     Clotting disorder Neg Hx     Collagen disease Neg Hx     Diabetes Neg Hx     Dislocations Neg Hx     Learning disabilities Neg Hx     Neurological problems Neg Hx     Osteoporosis Neg Hx     Rheumatologic disease Neg Hx     Scoliosis Neg Hx     Vascular Disease Neg Hx        Meds/Allergies       Current Outpatient Medications:     ALPRAZolam (XANAX) 0 25 mg tablet    atorvastatin (LIPITOR) 10 mg tablet    carvedilol (COREG) 6 25 mg tablet    timolol (BETIMOL) 0 5 % ophthalmic solution    tobramycin-dexamethasone (TOBRADEX) ophthalmic suspension    valsartan (DIOVAN) 320 MG tablet    predniSONE 10 mg tablet    Allergies   Allergen Reactions    Bee Venom Swelling           Objective     Blood pressure 164/96, pulse 80, weight 65 8 kg (145 lb), not currently breastfeeding  Body mass index is 24 5 kg/m²  PHYSICAL EXAM:      General Appearance:   Alert, cooperative, no distress   HEENT:   Normocephalic, atraumatic, anicteric      Neck:  Supple, symmetrical, trachea midline   Lungs:   Clear to auscultation bilaterally; no rales, rhonchi or wheezing; respirations unlabored    Heart[de-identified]   Regular rate and rhythm; no murmur, rub, or gallop  Abdomen:   Soft, non-tender, non-distended; normal bowel sounds; no masses, no organomegaly   Liver is enlarged  Liver margin is sharp  Liver consistency is soft  Spleen is not palpable   Genitalia:   Deferred    Rectal:   Deferred    Extremities:  No cyanosis, clubbing or edema    Pulses:  2+ and symmetric    Skin:  No jaundice, rashes, or lesions    Lymph nodes:  No palpable cervical lymphadenopathy        Lab Results:   No visits with results within 1 Day(s) from this visit     Latest known visit with results is:   Appointment on 06/30/2021   Component Date Value    BUN 06/30/2021 9     Creatinine 06/30/2021 0 62     eGFR 06/30/2021 95          Radiology Results:   CT abdomen pelvis w contrast    Result Date: 6/30/2021  Narrative: CT ABDOMEN AND PELVIS WITH IV CONTRAST INDICATION:   R10 32: Left lower quadrant pain  Assess for diverticulitis COMPARISON:  6/9/2019 TECHNIQUE:  CT examination of the abdomen and pelvis was performed  Axial, sagittal, and coronal 2D reformatted images were created from the source data and submitted for interpretation  Radiation dose length product (DLP) for this visit:  430 46 mGy-cm   This examination, like all CT scans performed in the [facility], was performed utilizing techniques to minimize radiation dose exposure, including the use of iterative  reconstruction and automated exposure control  IV Contrast:  100 mL of iohexol (OMNIPAQUE) Enteric Contrast:  Enteric contrast was not administered  FINDINGS: ABDOMEN LOWER CHEST:  Stable pleural-based 6 mm nodule, left costophrenic angle, image 2/11 LIVER/BILIARY TREE:  Unremarkable  GALLBLADDER:  No calcified gallstones  No pericholecystic inflammatory change  SPLEEN:  Unremarkable  PANCREAS:  Unremarkable  ADRENAL GLANDS:  Unremarkable  KIDNEYS/URETERS:  Unremarkable  No hydronephrosis  STOMACH AND BOWEL:  There is no evidence of recurrent /acute diverticulitis  There is no bowel obstruction  Scattered diverticula noted APPENDIX:  No findings to suggest appendicitis  ABDOMINOPELVIC CAVITY:  No ascites  No pneumoperitoneum  No lymphadenopathy  VESSELS:  Unremarkable for patient's age  PELVIS REPRODUCTIVE ORGANS:  Unremarkable for patient's age  URINARY BLADDER:  Unremarkable  ABDOMINAL WALL/INGUINAL REGIONS:  Unremarkable  OSSEOUS STRUCTURES:  No acute fracture or destructive osseous lesion  Impression: 1  Scattered colonic diverticula but no evidence of acute/recurrent diverticulitis  No bowel obstruction 2  Overall, no acute inflammatory changes in the abdomen or pelvis  No hydronephrosis   Workstation performed: [id number]

## 2021-07-08 ENCOUNTER — OFFICE VISIT (OUTPATIENT)
Dept: FAMILY MEDICINE CLINIC | Facility: CLINIC | Age: 66
End: 2021-07-08
Payer: COMMERCIAL

## 2021-07-08 VITALS
TEMPERATURE: 97.5 F | HEIGHT: 65 IN | WEIGHT: 142 LBS | SYSTOLIC BLOOD PRESSURE: 136 MMHG | HEART RATE: 80 BPM | RESPIRATION RATE: 16 BRPM | DIASTOLIC BLOOD PRESSURE: 80 MMHG | BODY MASS INDEX: 23.66 KG/M2 | OXYGEN SATURATION: 97 %

## 2021-07-08 DIAGNOSIS — L23.7 POISON IVY: ICD-10-CM

## 2021-07-08 DIAGNOSIS — F41.1 GENERALIZED ANXIETY DISORDER: ICD-10-CM

## 2021-07-08 DIAGNOSIS — I10 ESSENTIAL HYPERTENSION: Primary | ICD-10-CM

## 2021-07-08 DIAGNOSIS — F41.1 ANXIETY STATE: ICD-10-CM

## 2021-07-08 PROCEDURE — 99214 OFFICE O/P EST MOD 30 MIN: CPT | Performed by: FAMILY MEDICINE

## 2021-07-08 RX ORDER — CARVEDILOL 12.5 MG/1
12.5 TABLET ORAL 2 TIMES DAILY WITH MEALS
Qty: 60 TABLET | Refills: 5 | Status: SHIPPED | OUTPATIENT
Start: 2021-07-08 | End: 2022-01-19

## 2021-07-08 RX ORDER — ALPRAZOLAM 0.25 MG/1
0.25 TABLET ORAL
Qty: 30 TABLET | Refills: 0 | Status: SHIPPED | OUTPATIENT
Start: 2021-07-08 | End: 2022-02-17 | Stop reason: SDUPTHER

## 2021-07-08 RX ORDER — ESCITALOPRAM OXALATE 10 MG/1
10 TABLET ORAL DAILY
Qty: 30 TABLET | Refills: 5 | Status: SHIPPED | OUTPATIENT
Start: 2021-07-08 | End: 2021-10-11 | Stop reason: ALTCHOICE

## 2021-07-15 ENCOUNTER — PREP FOR PROCEDURE (OUTPATIENT)
Dept: GASTROENTEROLOGY | Facility: CLINIC | Age: 66
End: 2021-07-15

## 2021-07-15 DIAGNOSIS — Z20.822 ENCOUNTER FOR SCREENING LABORATORY TESTING FOR COVID-19 VIRUS IN ASYMPTOMATIC PATIENT: Primary | ICD-10-CM

## 2021-07-20 ENCOUNTER — TELEPHONE (OUTPATIENT)
Dept: FAMILY MEDICINE CLINIC | Facility: CLINIC | Age: 66
End: 2021-07-20

## 2021-07-21 ENCOUNTER — OFFICE VISIT (OUTPATIENT)
Dept: FAMILY MEDICINE CLINIC | Facility: CLINIC | Age: 66
End: 2021-07-21
Payer: COMMERCIAL

## 2021-07-21 VITALS
TEMPERATURE: 97.9 F | SYSTOLIC BLOOD PRESSURE: 174 MMHG | HEIGHT: 65 IN | WEIGHT: 149 LBS | DIASTOLIC BLOOD PRESSURE: 98 MMHG | HEART RATE: 76 BPM | OXYGEN SATURATION: 98 % | BODY MASS INDEX: 24.83 KG/M2

## 2021-07-21 DIAGNOSIS — I10 ESSENTIAL HYPERTENSION: Primary | ICD-10-CM

## 2021-07-21 DIAGNOSIS — R42 DIZZINESS: ICD-10-CM

## 2021-07-21 DIAGNOSIS — F41.1 GENERALIZED ANXIETY DISORDER: ICD-10-CM

## 2021-07-21 PROCEDURE — 99214 OFFICE O/P EST MOD 30 MIN: CPT | Performed by: FAMILY MEDICINE

## 2021-07-21 RX ORDER — LOSARTAN POTASSIUM AND HYDROCHLOROTHIAZIDE 12.5; 1 MG/1; MG/1
1 TABLET ORAL DAILY
Qty: 30 TABLET | Refills: 0 | Status: SHIPPED | OUTPATIENT
Start: 2021-07-21 | End: 2021-08-13 | Stop reason: SDUPTHER

## 2021-07-21 NOTE — PROGRESS NOTES
Subjective:      Patient ID: Jaylyn Pretty is a 72 y o  female  Is here because elevated blood pressure, reports dizziness, fatigue  Patient states that she also has intermittent headaches  Patient recently completed a course of prednisone taper due to poison ivy exposure  She does get intermittent rashes and hives from pollen exposure  She occasionally takes NSAIDs which tries to avoid dementia    Fatigue  Associated symptoms include fatigue  Pertinent negatives include no abdominal pain, arthralgias, chest pain, congestion, coughing, fever, headaches, myalgias, nausea, rash or sore throat  Past Medical History:   Diagnosis Date    Diverticulitis of colon     Fatty liver 6/17/2021    Glaucoma     Hypercholesteremia     Hypertension        Family History   Problem Relation Age of Onset    Hyperlipidemia Mother     Hypertension Mother     Other Mother         cerebrovascular accident   Elwyn Serge Stroke Mother    Elwyn Serge Lung cancer Father     No Known Problems Brother     No Known Problems Maternal Aunt     No Known Problems Maternal Uncle     Breast cancer Paternal Aunt     No Known Problems Paternal Uncle     No Known Problems Maternal Grandmother     No Known Problems Maternal Grandfather     No Known Problems Paternal Grandmother     No Known Problems Paternal Grandfather     No Known Problems Brother     ADD / ADHD Neg Hx     Anesthesia problems Neg Hx     Cancer Neg Hx     Clotting disorder Neg Hx     Collagen disease Neg Hx     Diabetes Neg Hx     Dislocations Neg Hx     Learning disabilities Neg Hx     Neurological problems Neg Hx     Osteoporosis Neg Hx     Rheumatologic disease Neg Hx     Scoliosis Neg Hx     Vascular Disease Neg Hx        Past Surgical History:   Procedure Laterality Date    BREAST CYST EXCISION Right     benign - 2000 approx      COLONOSCOPY  10/11/2016    MAMMO (HISTORICAL)  12/14/2018,11/10/17    TONSILLECTOMY          reports that she has never smoked  She has never used smokeless tobacco  She reports previous alcohol use  She reports that she does not use drugs  Current Outpatient Medications:     ALPRAZolam (XANAX) 0 25 mg tablet, Take 1 tablet (0 25 mg total) by mouth daily at bedtime as needed for anxiety, Disp: 30 tablet, Rfl: 0    atorvastatin (LIPITOR) 10 mg tablet, Take 1 tablet (10 mg total) by mouth daily, Disp: 90 tablet, Rfl: 2    carvedilol (COREG) 12 5 mg tablet, Take 1 tablet (12 5 mg total) by mouth 2 (two) times a day with meals, Disp: 60 tablet, Rfl: 5    escitalopram (LEXAPRO) 10 mg tablet, Take 1 tablet (10 mg total) by mouth daily, Disp: 30 tablet, Rfl: 5    losartan-hydrochlorothiazide (HYZAAR) 100-12 5 MG per tablet, Take 1 tablet by mouth daily, Disp: 30 tablet, Rfl: 0    The following portions of the patient's history were reviewed and updated as appropriate: allergies, current medications, past family history, past medical history, past social history, past surgical history and problem list     Review of Systems   Constitutional: Positive for fatigue  Negative for fever  HENT: Negative for congestion, facial swelling, mouth sores, rhinorrhea, sore throat and trouble swallowing  Eyes: Negative for pain and redness  Respiratory: Negative for cough, shortness of breath and wheezing  Cardiovascular: Negative for chest pain, palpitations and leg swelling  Gastrointestinal: Negative for abdominal pain, blood in stool, constipation, diarrhea and nausea  Genitourinary: Negative for dysuria, hematuria and urgency  Musculoskeletal: Negative for arthralgias, back pain and myalgias  Skin: Negative for rash and wound  Neurological: Positive for dizziness  Negative for seizures, syncope and headaches  Hematological: Negative for adenopathy  Psychiatric/Behavioral: Negative for agitation and behavioral problems           PHQ-9 Depression Screening    PHQ-9:   Frequency of the following problems over the past two weeks:      Little interest or pleasure in doing things: 0 - not at all  Feeling down, depressed, or hopeless: 0 - not at all  PHQ-2 Score: 0       [unfilled]    Objective:    BP (!) 174/98 (BP Location: Left arm, Patient Position: Sitting, Cuff Size: Standard)   Pulse 76   Temp 97 9 °F (36 6 °C) (Temporal)   Ht 5' 4 5" (1 638 m)   Wt 67 6 kg (149 lb)   SpO2 98%   BMI 25 18 kg/m²      Physical Exam  Vitals and nursing note reviewed  Constitutional:       Appearance: Normal appearance  She is well-developed  She is not ill-appearing  HENT:      Head: Normocephalic and atraumatic  Right Ear: External ear normal       Left Ear: External ear normal       Nose: Nose normal       Mouth/Throat:      Mouth: Mucous membranes are moist       Pharynx: No oropharyngeal exudate or posterior oropharyngeal erythema  Eyes:      General: No scleral icterus  Right eye: No discharge  Left eye: No discharge  Conjunctiva/sclera: Conjunctivae normal    Cardiovascular:      Rate and Rhythm: Normal rate  Heart sounds: No murmur heard  No gallop  Pulmonary:      Effort: Pulmonary effort is normal  No respiratory distress  Breath sounds: Normal breath sounds  No stridor  No wheezing, rhonchi or rales  Abdominal:      Palpations: Abdomen is soft  Tenderness: There is no abdominal tenderness  Musculoskeletal:         General: No tenderness or deformity  Skin:     Findings: No erythema or rash  Neurological:      General: No focal deficit present  Mental Status: She is alert and oriented to person, place, and time  Mental status is at baseline  Cranial Nerves: No cranial nerve deficit  Motor: No weakness        Coordination: Coordination normal       Gait: Gait normal       Deep Tendon Reflexes: Reflexes normal    Psychiatric:         Behavior: Behavior normal          Judgment: Judgment normal            Recent Results (from the past 2520 hour(s))   POCT urine dip    Collection Time: 05/25/21  3:11 PM   Result Value Ref Range    LEUKOCYTE ESTERASE,UA NEG     NITRITE,UA NEG     SL AMB POCT UROBILINOGEN NEG     POCT URINE PROTEIN NEG      PH,UA 6 0     BLOOD,UA NEG     SPECIFIC GRAVITY,UA 1 010     KETONES,UA NEG     BILIRUBIN,UA NEG     GLUCOSE, UA NEG    Urinalysis with microscopic    Collection Time: 06/30/21 11:57 AM   Result Value Ref Range    Clarity, UA Clear     Color, UA Yellow     Specific Gravity, UA 1 010 1 000 - 1 030    pH, UA 7 0 5 0, 5 5, 6 0, 6 5, 7 0, 7 5, 8 0, 8 5, 9 0    Glucose, UA Negative Negative mg/dl    Ketones, UA Negative Negative mg/dl    Blood, UA Negative Negative    Protein, UA Negative Negative mg/dl    Nitrite, UA Negative Negative    Bilirubin, UA Negative Negative    Urobilinogen, UA 1 0 0 2, 1 0 E U /dl E U /dl    Leukocytes, UA Negative Negative    WBC, UA 0-1 (A) None Seen, 2-4 /hpf    RBC, UA None Seen None Seen, 2-4 /hpf    Bacteria, UA None Seen None Seen, Occasional /hpf    Epithelial Cells Occasional None Seen, Occasional /hpf   BUN    Collection Time: 06/30/21 11:57 AM   Result Value Ref Range    BUN 9 5 - 25 mg/dL   Creatinine, serum    Collection Time: 06/30/21 11:57 AM   Result Value Ref Range    Creatinine 0 62 0 60 - 1 30 mg/dL    eGFR 95 ml/min/1 73sq m       Laboratory Results: I have personally reviewed the pertinent laboratory results/reports     Radiology/Other Diagnostic Testing Results: I have personally reviewed pertinent reports  CT abdomen pelvis w contrast    Result Date: 6/30/2021  CT ABDOMEN AND PELVIS WITH IV CONTRAST INDICATION:   R10 32: Left lower quadrant pain  Assess for diverticulitis COMPARISON:  6/9/2019 TECHNIQUE:  CT examination of the abdomen and pelvis was performed  Axial, sagittal, and coronal 2D reformatted images were created from the source data and submitted for interpretation  Radiation dose length product (DLP) for this visit:  430 46 mGy-cm     This examination, like all CT scans performed in the [facility], was performed utilizing techniques to minimize radiation dose exposure, including the use of iterative  reconstruction and automated exposure control  IV Contrast:  100 mL of iohexol (OMNIPAQUE) Enteric Contrast:  Enteric contrast was not administered  FINDINGS: ABDOMEN LOWER CHEST:  Stable pleural-based 6 mm nodule, left costophrenic angle, image 2/11 LIVER/BILIARY TREE:  Unremarkable  GALLBLADDER:  No calcified gallstones  No pericholecystic inflammatory change  SPLEEN:  Unremarkable  PANCREAS:  Unremarkable  ADRENAL GLANDS:  Unremarkable  KIDNEYS/URETERS:  Unremarkable  No hydronephrosis  STOMACH AND BOWEL:  There is no evidence of recurrent /acute diverticulitis  There is no bowel obstruction  Scattered diverticula noted APPENDIX:  No findings to suggest appendicitis  ABDOMINOPELVIC CAVITY:  No ascites  No pneumoperitoneum  No lymphadenopathy  VESSELS:  Unremarkable for patient's age  PELVIS REPRODUCTIVE ORGANS:  Unremarkable for patient's age  URINARY BLADDER:  Unremarkable  ABDOMINAL WALL/INGUINAL REGIONS:  Unremarkable  OSSEOUS STRUCTURES:  No acute fracture or destructive osseous lesion  1  Scattered colonic diverticula but no evidence of acute/recurrent diverticulitis  No bowel obstruction 2  Overall, no acute inflammatory changes in the abdomen or pelvis  No hydronephrosis  Workstation performed: [id number]        Assessment/Plan:         Diagnoses and all orders for this visit:    Essential hypertension  -     losartan-hydrochlorothiazide (HYZAAR) 100-12 5 MG per tablet; Take 1 tablet by mouth daily  -     Basic metabolic panel; Future    Generalized anxiety disorder    Dizziness       Given elevated blood pressure I have advised the patient to stop losartan start losartan hydrochlorothiazide and repeat BMP in 1 week and follow up with Dr Rachel Gilman  Neuro exam is normal I have told her the ER precautions in case of blood pressure more than 180/100  I have told her to avoid long steroid tapers in the future      Read package inserts for all medications before starting a new medications, call me if you have any questions  Patient was given opportunity to ask questions and all questions were answered  Portions of the record may have been created with voice recognition software  Occasional wrong word or "sound a like" substitutions may have occurred due to the inherent limitations of voice recognition software  Read the chart carefully and recognize, using context, where substitutions have occurred

## 2021-07-22 ENCOUNTER — HOSPITAL ENCOUNTER (EMERGENCY)
Facility: HOSPITAL | Age: 66
Discharge: HOME | End: 2021-07-22
Attending: EMERGENCY MEDICINE | Admitting: EMERGENCY MEDICINE
Payer: COMMERCIAL

## 2021-07-22 ENCOUNTER — APPOINTMENT (EMERGENCY)
Dept: RADIOLOGY | Facility: HOSPITAL | Age: 66
End: 2021-07-22
Payer: COMMERCIAL

## 2021-07-22 ENCOUNTER — TELEPHONE (OUTPATIENT)
Dept: FAMILY MEDICINE CLINIC | Facility: CLINIC | Age: 66
End: 2021-07-22

## 2021-07-22 VITALS
OXYGEN SATURATION: 96 % | SYSTOLIC BLOOD PRESSURE: 163 MMHG | HEART RATE: 72 BPM | DIASTOLIC BLOOD PRESSURE: 79 MMHG | TEMPERATURE: 98.3 F | WEIGHT: 147.6 LBS | BODY MASS INDEX: 24.94 KG/M2 | RESPIRATION RATE: 18 BRPM

## 2021-07-22 DIAGNOSIS — R42 LIGHTHEADEDNESS: Primary | ICD-10-CM

## 2021-07-22 DIAGNOSIS — I10 HTN (HYPERTENSION): ICD-10-CM

## 2021-07-22 LAB
ALBUMIN SERPL BCP-MCNC: 4 G/DL (ref 3.5–5)
ALP SERPL-CCNC: 48 U/L (ref 46–116)
ALT SERPL W P-5'-P-CCNC: 99 U/L (ref 12–78)
ANION GAP SERPL CALCULATED.3IONS-SCNC: 9 MMOL/L (ref 4–13)
AST SERPL W P-5'-P-CCNC: 56 U/L (ref 5–45)
BASOPHILS # BLD AUTO: 0.05 THOUSANDS/ΜL (ref 0–0.1)
BASOPHILS NFR BLD AUTO: 1 % (ref 0–1)
BILIRUB SERPL-MCNC: 0.53 MG/DL (ref 0.2–1)
BUN SERPL-MCNC: 10 MG/DL (ref 5–25)
CALCIUM SERPL-MCNC: 8.8 MG/DL (ref 8.3–10.1)
CHLORIDE SERPL-SCNC: 103 MMOL/L (ref 100–108)
CO2 SERPL-SCNC: 30 MMOL/L (ref 21–32)
CREAT SERPL-MCNC: 0.74 MG/DL (ref 0.6–1.3)
EOSINOPHIL # BLD AUTO: 0.14 THOUSAND/ΜL (ref 0–0.61)
EOSINOPHIL NFR BLD AUTO: 2 % (ref 0–6)
ERYTHROCYTE [DISTWIDTH] IN BLOOD BY AUTOMATED COUNT: 12.6 % (ref 11.6–15.1)
GFR SERPL CREATININE-BSD FRML MDRD: 85 ML/MIN/1.73SQ M
GLUCOSE SERPL-MCNC: 100 MG/DL (ref 65–140)
HCT VFR BLD AUTO: 40.4 % (ref 34.8–46.1)
HGB BLD-MCNC: 13.1 G/DL (ref 11.5–15.4)
IMM GRANULOCYTES # BLD AUTO: 0.02 THOUSAND/UL (ref 0–0.2)
IMM GRANULOCYTES NFR BLD AUTO: 0 % (ref 0–2)
LYMPHOCYTES # BLD AUTO: 1.82 THOUSANDS/ΜL (ref 0.6–4.47)
LYMPHOCYTES NFR BLD AUTO: 30 % (ref 14–44)
MAGNESIUM SERPL-MCNC: 1.9 MG/DL (ref 1.6–2.6)
MCH RBC QN AUTO: 29.5 PG (ref 26.8–34.3)
MCHC RBC AUTO-ENTMCNC: 32.4 G/DL (ref 31.4–37.4)
MCV RBC AUTO: 91 FL (ref 82–98)
MONOCYTES # BLD AUTO: 0.48 THOUSAND/ΜL (ref 0.17–1.22)
MONOCYTES NFR BLD AUTO: 8 % (ref 4–12)
NEUTROPHILS # BLD AUTO: 3.51 THOUSANDS/ΜL (ref 1.85–7.62)
NEUTS SEG NFR BLD AUTO: 59 % (ref 43–75)
NRBC BLD AUTO-RTO: 0 /100 WBCS
NT-PROBNP SERPL-MCNC: 501 PG/ML
PLATELET # BLD AUTO: 200 THOUSANDS/UL (ref 149–390)
PMV BLD AUTO: 9.5 FL (ref 8.9–12.7)
POTASSIUM SERPL-SCNC: 3.5 MMOL/L (ref 3.5–5.3)
PROT SERPL-MCNC: 7.5 G/DL (ref 6.4–8.2)
RBC # BLD AUTO: 4.44 MILLION/UL (ref 3.81–5.12)
SODIUM SERPL-SCNC: 142 MMOL/L (ref 136–145)
TROPONIN I SERPL-MCNC: <0.02 NG/ML
WBC # BLD AUTO: 6.02 THOUSAND/UL (ref 4.31–10.16)

## 2021-07-22 PROCEDURE — 85025 COMPLETE CBC W/AUTO DIFF WBC: CPT | Performed by: EMERGENCY MEDICINE

## 2021-07-22 PROCEDURE — 80053 COMPREHEN METABOLIC PANEL: CPT | Performed by: EMERGENCY MEDICINE

## 2021-07-22 PROCEDURE — 99284 EMERGENCY DEPT VISIT MOD MDM: CPT | Performed by: EMERGENCY MEDICINE

## 2021-07-22 PROCEDURE — 99284 EMERGENCY DEPT VISIT MOD MDM: CPT

## 2021-07-22 PROCEDURE — 83880 ASSAY OF NATRIURETIC PEPTIDE: CPT | Performed by: EMERGENCY MEDICINE

## 2021-07-22 PROCEDURE — 36415 COLL VENOUS BLD VENIPUNCTURE: CPT | Performed by: EMERGENCY MEDICINE

## 2021-07-22 PROCEDURE — 83735 ASSAY OF MAGNESIUM: CPT | Performed by: EMERGENCY MEDICINE

## 2021-07-22 PROCEDURE — 93005 ELECTROCARDIOGRAM TRACING: CPT

## 2021-07-22 PROCEDURE — 84484 ASSAY OF TROPONIN QUANT: CPT | Performed by: EMERGENCY MEDICINE

## 2021-07-22 PROCEDURE — 70450 CT HEAD/BRAIN W/O DYE: CPT

## 2021-07-22 PROCEDURE — G1004 CDSM NDSC: HCPCS

## 2021-07-22 RX ORDER — CARVEDILOL 12.5 MG/1
12.5 TABLET ORAL ONCE
Status: COMPLETED | OUTPATIENT
Start: 2021-07-22 | End: 2021-07-22

## 2021-07-22 RX ORDER — MECLIZINE HYDROCHLORIDE 25 MG/1
25 TABLET ORAL ONCE
Status: DISCONTINUED | OUTPATIENT
Start: 2021-07-22 | End: 2021-07-22

## 2021-07-22 RX ORDER — BUTALBITAL, ACETAMINOPHEN AND CAFFEINE 50; 325; 40 MG/1; MG/1; MG/1
1 TABLET ORAL ONCE
Status: COMPLETED | OUTPATIENT
Start: 2021-07-22 | End: 2021-07-22

## 2021-07-22 RX ORDER — LOSARTAN POTASSIUM 50 MG/1
100 TABLET ORAL ONCE
Status: COMPLETED | OUTPATIENT
Start: 2021-07-22 | End: 2021-07-22

## 2021-07-22 RX ORDER — BUTALBITAL, ACETAMINOPHEN AND CAFFEINE 50; 325; 40 MG/1; MG/1; MG/1
1 TABLET ORAL EVERY 4 HOURS PRN
Qty: 12 TABLET | Refills: 0 | Status: SHIPPED | OUTPATIENT
Start: 2021-07-22 | End: 2021-07-25

## 2021-07-22 RX ADMIN — CARVEDILOL 12.5 MG: 12.5 TABLET, FILM COATED ORAL at 19:15

## 2021-07-22 RX ADMIN — BUTALBITAL, ACETAMINOPHEN, AND CAFFEINE 1 TABLET: 50; 325; 40 TABLET ORAL at 21:22

## 2021-07-22 RX ADMIN — LOSARTAN POTASSIUM 100 MG: 50 TABLET, FILM COATED ORAL at 19:15

## 2021-07-22 NOTE — ED PROVIDER NOTES
History  Chief Complaint   Patient presents with    Dizziness     States she went to her PCP yesterday for dizziness and her BP was elevated and med changes made  Valsartan was stopped yesterday and a new med starting with a L started today  Took BP at 4:30 pm and /95     On HTN medications, went to doctor who changed her medications  Patient compliant, started on cozaar-HCTZ  History provided by:  Patient   used: No    Hypertension  Severity:  Moderate  Onset quality:  Gradual  Duration:  1 day  Timing:  Constant  Chronicity:  New  Relieved by:  Nothing  Worsened by:  Nothing  Ineffective treatments:  Medication  Associated symptoms: dizziness    Associated symptoms: no abdominal pain, no anxiety, no chest pain, no headaches, no loss of consciousness, no nausea, no neck pain, no palpitations, no peripheral edema, no shortness of breath, no syncope and no weakness        Prior to Admission Medications   Prescriptions Last Dose Informant Patient Reported? Taking?    ALPRAZolam (XANAX) 0 25 mg tablet Unknown at Unknown time  No No   Sig: Take 1 tablet (0 25 mg total) by mouth daily at bedtime as needed for anxiety   atorvastatin (LIPITOR) 10 mg tablet 7/22/2021 at Unknown time Self No Yes   Sig: Take 1 tablet (10 mg total) by mouth daily   carvedilol (COREG) 12 5 mg tablet 7/22/2021 at 0900  No Yes   Sig: Take 1 tablet (12 5 mg total) by mouth 2 (two) times a day with meals   escitalopram (LEXAPRO) 10 mg tablet Unknown at Unknown time  No No   Sig: Take 1 tablet (10 mg total) by mouth daily   losartan-hydrochlorothiazide (HYZAAR) 100-12 5 MG per tablet 7/22/2021 at Unknown time  No Yes   Sig: Take 1 tablet by mouth daily   timolol (BETIMOL) 0 5 % ophthalmic solution 7/22/2021 at Unknown time Self Yes Yes   Sig: Administer 1 drop to both eyes daily      Facility-Administered Medications: None       Past Medical History:   Diagnosis Date    Diverticulitis of colon     Fatty liver 6/17/2021    Glaucoma     Hypercholesteremia     Hypertension        Past Surgical History:   Procedure Laterality Date    BREAST CYST EXCISION Right     benign - 2000 approx   COLONOSCOPY  10/11/2016    MAMMO (HISTORICAL)  12/14/2018,11/10/17    TONSILLECTOMY         Family History   Problem Relation Age of Onset    Hyperlipidemia Mother     Hypertension Mother     Other Mother         cerebrovascular accident   Eleanorbella Solorzanonce Stroke Mother    Eleanor Solorzanonce Lung cancer Father     No Known Problems Brother     No Known Problems Maternal Aunt     No Known Problems Maternal Uncle     Breast cancer Paternal Aunt     No Known Problems Paternal Uncle     No Known Problems Maternal Grandmother     No Known Problems Maternal Grandfather     No Known Problems Paternal Grandmother     No Known Problems Paternal Grandfather     No Known Problems Brother     ADD / ADHD Neg Hx     Anesthesia problems Neg Hx     Cancer Neg Hx     Clotting disorder Neg Hx     Collagen disease Neg Hx     Diabetes Neg Hx     Dislocations Neg Hx     Learning disabilities Neg Hx     Neurological problems Neg Hx     Osteoporosis Neg Hx     Rheumatologic disease Neg Hx     Scoliosis Neg Hx     Vascular Disease Neg Hx      I have reviewed and agree with the history as documented  E-Cigarette/Vaping    E-Cigarette Use Never User      E-Cigarette/Vaping Substances    Nicotine No     THC No     CBD No     Flavoring No     Other No     Unknown No      Social History     Tobacco Use    Smoking status: Never Smoker    Smokeless tobacco: Never Used   Vaping Use    Vaping Use: Never used   Substance Use Topics    Alcohol use: Not Currently    Drug use: Never       Review of Systems   Constitutional: Negative  HENT: Negative  Eyes: Negative  Respiratory: Negative  Negative for shortness of breath  Cardiovascular: Negative  Negative for chest pain, palpitations and syncope  Gastrointestinal: Negative    Negative for abdominal pain and nausea  Endocrine: Negative  Genitourinary: Negative  Musculoskeletal: Negative  Negative for neck pain  Skin: Negative  Allergic/Immunologic: Negative  Neurological: Positive for dizziness and light-headedness  Negative for loss of consciousness, weakness and headaches  Hematological: Negative  Psychiatric/Behavioral: Negative  The patient is not nervous/anxious  All other systems reviewed and are negative  Physical Exam  Physical Exam  Constitutional:       Appearance: Normal appearance  HENT:      Head: Normocephalic and atraumatic  Nose: Nose normal       Mouth/Throat:      Mouth: Mucous membranes are moist    Eyes:      Extraocular Movements: Extraocular movements intact  Pupils: Pupils are equal, round, and reactive to light  Cardiovascular:      Rate and Rhythm: Normal rate and regular rhythm  Pulmonary:      Effort: Pulmonary effort is normal       Breath sounds: Normal breath sounds  Abdominal:      General: Abdomen is flat  Bowel sounds are normal       Palpations: Abdomen is soft  Musculoskeletal:         General: Normal range of motion  Cervical back: Normal range of motion and neck supple  Skin:     General: Skin is warm  Capillary Refill: Capillary refill takes less than 2 seconds  Neurological:      General: No focal deficit present  Mental Status: She is alert and oriented to person, place, and time  Mental status is at baseline  Cranial Nerves: No cranial nerve deficit  Sensory: No sensory deficit  Motor: No weakness  Coordination: Coordination normal       Gait: Gait normal       Deep Tendon Reflexes: Reflexes normal    Psychiatric:         Mood and Affect: Mood normal          Thought Content:  Thought content normal          Vital Signs  ED Triage Vitals [07/22/21 1831]   Temperature Pulse Respirations Blood Pressure SpO2   98 3 °F (36 8 °C) 83 18 (!) 201/101 98 %      Temp Source Heart Rate Source Patient Position - Orthostatic VS BP Location FiO2 (%)   Tympanic Monitor Sitting Left arm --      Pain Score       3           Vitals:    07/22/21 2045 07/22/21 2100 07/22/21 2101 07/22/21 2115   BP:  163/79 163/79    Pulse: 74 70 67 72   Patient Position - Orthostatic VS:   Sitting          Visual Acuity      ED Medications  Medications   losartan (COZAAR) tablet 100 mg (100 mg Oral Given 7/22/21 1915)   carvedilol (COREG) tablet 12 5 mg (12 5 mg Oral Given 7/22/21 1915)   butalbital-acetaminophen-caffeine (FIORICET,ESGIC) -40 mg per tablet 1 tablet (1 tablet Oral Given 7/22/21 2122)       Diagnostic Studies  Results Reviewed     Procedure Component Value Units Date/Time    NT-BNP PRO [[id number]]  (Abnormal) Collected: 07/22/21 1907    Lab Status: Final result Specimen: Blood from Arm, Right Updated: 07/22/21 1949     NT-proBNP 501 pg/mL     Magnesium [[id number]]  (Normal) Collected: 07/22/21 1907    Lab Status: Final result Specimen: Blood from Arm, Right Updated: 07/22/21 1949     Magnesium 1 9 mg/dL     Troponin I [[id number]]  (Normal) Collected: 07/22/21 1907    Lab Status: Final result Specimen: Blood from Arm, Right Updated: 07/22/21 1946     Troponin I <0 02 ng/mL     Comprehensive metabolic panel [[id number]]  (Abnormal) Collected: 07/22/21 1907    Lab Status: Final result Specimen: Blood from Arm, Right Updated: 07/22/21 1943     Sodium 142 mmol/L      Potassium 3 5 mmol/L      Chloride 103 mmol/L      CO2 30 mmol/L      ANION GAP 9 mmol/L      BUN 10 mg/dL      Creatinine 0 74 mg/dL      Glucose 100 mg/dL      Calcium 8 8 mg/dL      AST 56 U/L      ALT 99 U/L      Alkaline Phosphatase 48 U/L      Total Protein 7 5 g/dL      Albumin 4 0 g/dL      Total Bilirubin 0 53 mg/dL      eGFR 85 ml/min/1 73sq m     Narrative:      Jose guidelines for Chronic Kidney Disease (CKD):     Stage 1 with normal or high GFR (GFR > 90 mL/min/1 73 square meters)    Stage 2 Mild CKD (GFR = 60-89 mL/min/1 73 square meters)    Stage 3A Moderate CKD (GFR = 45-59 mL/min/1 73 square meters)    Stage 3B Moderate CKD (GFR = 30-44 mL/min/1 73 square meters)    Stage 4 Severe CKD (GFR = 15-29 mL/min/1 73 square meters)    Stage 5 End Stage CKD (GFR <15 mL/min/1 73 square meters)  Note: GFR calculation is accurate only with a steady state creatinine    CBC and differential [[id number]] Collected: 07/22/21 1907    Lab Status: Final result Specimen: Blood from Arm, Right Updated: 07/22/21 1917     WBC 6 02 Thousand/uL      RBC 4 44 Million/uL      Hemoglobin 13 1 g/dL      Hematocrit 40 4 %      MCV 91 fL      MCH 29 5 pg      MCHC 32 4 g/dL      RDW 12 6 %      MPV 9 5 fL      Platelets 563 Thousands/uL      nRBC 0 /100 WBCs      Neutrophils Relative 59 %      Immat GRANS % 0 %      Lymphocytes Relative 30 %      Monocytes Relative 8 %      Eosinophils Relative 2 %      Basophils Relative 1 %      Neutrophils Absolute 3 51 Thousands/µL      Immature Grans Absolute 0 02 Thousand/uL      Lymphocytes Absolute 1 82 Thousands/µL      Monocytes Absolute 0 48 Thousand/µL      Eosinophils Absolute 0 14 Thousand/µL      Basophils Absolute 0 05 Thousands/µL                  CT head without contrast   Final Result by Ling Serna DO (07/22 2048)      No acute intracranial abnormality                    Workstation performed: [id number]                    Procedures  ECG 12 Lead Documentation Only    Date/Time: 7/22/2021 7:04 PM  Performed by: Krystin Lopez DO  Authorized by: Krystin Lopez DO     ECG reviewed by me, the ED Provider: yes    Patient location:  ED  Previous ECG:     Previous ECG:  Unavailable    Comparison to cardiac monitor: Yes    Interpretation:     Interpretation: normal    Rate:     ECG rate assessment: normal    Rhythm:     Rhythm: sinus rhythm    Ectopy:     Ectopy: none    QRS:     QRS axis:  Normal  Conduction:     Conduction: normal    ST segments:     ST segments:  Normal  T waves:     T waves: normal               ED Course                                           MDM  Number of Diagnoses or Management Options  HTN (hypertension)  Lightheadedness  Diagnosis management comments: Blood pressure improved with her home medications given in the ED  Discharged with follow up  Amount and/or Complexity of Data Reviewed  Clinical lab tests: reviewed and ordered  Tests in the radiology section of CPT®: reviewed and ordered  Tests in the medicine section of CPT®: ordered and reviewed    Patient Progress  Patient progress: stable      Disposition  Final diagnoses:   Lightheadedness   HTN (hypertension)     Time reflects when diagnosis was documented in both MDM as applicable and the Disposition within this note     Time User Action Codes Description Comment    7/22/2021  9:02 PM Pina Meneses Add [R42] Lightheadedness     7/22/2021  9:02 PM Joshua Meneses Add [I10] HTN (hypertension)       ED Disposition     ED Disposition Condition Date/Time Comment    Discharge Stable Thu Jul 22, 2021  9:02 PM Onur Tabor discharge to home/self care              Follow-up Information     Follow up With Specialties Details Why Contact Info Additional Information    Bhavik Valente MD Family Medicine Schedule an appointment as soon as possible for a visit   [address]  [facility] [address] Emergency Department Emergency Medicine  If symptoms worsen [address] [address] [address] [address] Emergency Department, [place], [place], [address]          Discharge Medication List as of 7/22/2021  9:09 PM      START taking these medications    Details   butalbital-acetaminophen-caffeine (FIORICET,ESGIC) -40 mg per tablet Take 1 tablet by mouth every 4 (four) hours as needed for headaches for up to 3 days, Starting Thu 7/22/2021, Until Sun 7/25/2021 at 2359, Normal         CONTINUE these medications which have NOT CHANGED    Details   ALPRAZolam (XANAX) 0 25 mg tablet Take 1 tablet (0 25 mg total) by mouth daily at bedtime as needed for anxiety, Starting Thu 7/8/2021, Normal      atorvastatin (LIPITOR) 10 mg tablet Take 1 tablet (10 mg total) by mouth daily, Starting Fri 11/6/2020, Normal      carvedilol (COREG) 12 5 mg tablet Take 1 tablet (12 5 mg total) by mouth 2 (two) times a day with meals, Starting Thu 7/8/2021, Normal      escitalopram (LEXAPRO) 10 mg tablet Take 1 tablet (10 mg total) by mouth daily, Starting Thu 7/8/2021, Normal      losartan-hydrochlorothiazide (HYZAAR) 100-12 5 MG per tablet Take 1 tablet by mouth daily, Starting Wed 7/21/2021, Normal      timolol (BETIMOL) 0 5 % ophthalmic solution Administer 1 drop to both eyes daily, Historical Med           No discharge procedures on file      PDMP Review       Value Time User    PDMP Reviewed  Yes 7/8/2021  1:32 PM Jamal Fernández MD          ED Provider  Electronically Signed by           Ángel Gustafson DO  07/23/21 5898

## 2021-07-22 NOTE — TELEPHONE ENCOUNTER
Continue taking the medications  If she develops chest pain epigastric headache,dizziness that is not going away then she should go to the emergency room  fever can elevate blood pressure  She should continue to take losartan hydrochlorothiazide combination    Blood pressure over 180/100 is concerning  and if associated with any symptoms she should go to the emergency room

## 2021-07-22 NOTE — TELEPHONE ENCOUNTER
Took blood pressure medication this morning and her blood pressure is 189/95 left arm and 200/101 on right arm- just took the bp now- no symptoms really she did have a fever today  Wants to know if she should do anything?

## 2021-07-23 LAB
ATRIAL RATE: 77 BPM
P AXIS: 15 DEGREES
PR INTERVAL: 152 MS
QRS AXIS: 20 DEGREES
QRSD INTERVAL: 78 MS
QT INTERVAL: 376 MS
QTC INTERVAL: 425 MS
T WAVE AXIS: 17 DEGREES
VENTRICULAR RATE: 77 BPM

## 2021-07-23 PROCEDURE — 93010 ELECTROCARDIOGRAM REPORT: CPT | Performed by: INTERNAL MEDICINE

## 2021-07-26 DIAGNOSIS — E78.5 HYPERLIPIDEMIA, UNSPECIFIED HYPERLIPIDEMIA TYPE: ICD-10-CM

## 2021-07-26 RX ORDER — ATORVASTATIN CALCIUM 10 MG/1
TABLET, FILM COATED ORAL
Qty: 90 TABLET | Refills: 2 | Status: SHIPPED | OUTPATIENT
Start: 2021-07-26 | End: 2022-03-09 | Stop reason: SDUPTHER

## 2021-07-29 ENCOUNTER — APPOINTMENT (OUTPATIENT)
Dept: LAB | Facility: HOSPITAL | Age: 66
End: 2021-07-29
Payer: COMMERCIAL

## 2021-07-29 DIAGNOSIS — I10 ESSENTIAL HYPERTENSION: ICD-10-CM

## 2021-07-29 LAB
ANION GAP SERPL CALCULATED.3IONS-SCNC: 7 MMOL/L (ref 4–13)
BUN SERPL-MCNC: 19 MG/DL (ref 5–25)
CALCIUM SERPL-MCNC: 9.1 MG/DL (ref 8.3–10.1)
CHLORIDE SERPL-SCNC: 102 MMOL/L (ref 100–108)
CO2 SERPL-SCNC: 31 MMOL/L (ref 21–32)
CREAT SERPL-MCNC: 0.81 MG/DL (ref 0.6–1.3)
GFR SERPL CREATININE-BSD FRML MDRD: 76 ML/MIN/1.73SQ M
GLUCOSE SERPL-MCNC: 110 MG/DL (ref 65–140)
POTASSIUM SERPL-SCNC: 3.8 MMOL/L (ref 3.5–5.3)
SODIUM SERPL-SCNC: 140 MMOL/L (ref 136–145)

## 2021-07-29 PROCEDURE — 36415 COLL VENOUS BLD VENIPUNCTURE: CPT

## 2021-07-29 PROCEDURE — 80048 BASIC METABOLIC PNL TOTAL CA: CPT

## 2021-07-29 NOTE — PROGRESS NOTES
Assessment/Plan:         Problem List Items Addressed This Visit        Cardiovascular and Mediastinum    Essential hypertension - Primary     Better control now on additional meds                 Subjective: pt here for HTN was in ER with elevated pressure and lightheadedness now on losartan /HCTZ with  Her carvedilol and readings are good at home     Patient ID: Kenya Draper is a 72 y o  female  HPI    The following portions of the patient's history were reviewed and updated as appropriate:   Past Medical History:  She has a past medical history of Diverticulitis of colon, Fatty liver (6/17/2021), Glaucoma, Hypercholesteremia, and Hypertension  ,  _______________________________________________________________________  Medical Problems:  does not have any pertinent problems on file ,  _______________________________________________________________________  Past Surgical History:   has a past surgical history that includes Tonsillectomy; Colonoscopy (10/11/2016); Mammo (historical) (12/14/2018,11/10/17); and Breast cyst excision (Right)  ,  _______________________________________________________________________  Family History:  family history includes Breast cancer in her paternal aunt; Hyperlipidemia in her mother; Hypertension in her mother; Lung cancer in her father; No Known Problems in her brother, brother, maternal aunt, maternal grandfather, maternal grandmother, maternal uncle, paternal grandfather, paternal grandmother, and paternal uncle; Other in her mother; Stroke in her mother ,  _______________________________________________________________________  Social History:   reports that she has never smoked  She has never used smokeless tobacco  She reports previous alcohol use  She reports that she does not use drugs  ,  _______________________________________________________________________  Allergies:  is allergic to bee venom   _______________________________________________________________________  Current Outpatient Medications   Medication Sig Dispense Refill    ALPRAZolam (XANAX) 0 25 mg tablet Take 1 tablet (0 25 mg total) by mouth daily at bedtime as needed for anxiety 30 tablet 0    atorvastatin (LIPITOR) 10 mg tablet take 1 tablet by mouth once daily 90 tablet 2    carvedilol (COREG) 12 5 mg tablet Take 1 tablet (12 5 mg total) by mouth 2 (two) times a day with meals 60 tablet 5    escitalopram (LEXAPRO) 10 mg tablet Take 1 tablet (10 mg total) by mouth daily 30 tablet 5    losartan-hydrochlorothiazide (HYZAAR) 100-12 5 MG per tablet Take 1 tablet by mouth daily 30 tablet 0    timolol (BETIMOL) 0 5 % ophthalmic solution Administer 1 drop to both eyes daily       No current facility-administered medications for this visit      _______________________________________________________________________  Review of Systems   Respiratory: Negative for cough, chest tightness and shortness of breath  Cardiovascular: Negative for chest pain, palpitations and leg swelling  Neurological: Negative for dizziness, weakness, light-headedness and headaches  Psychiatric/Behavioral: Negative for dysphoric mood  The patient is not nervous/anxious  Objective:  Vitals:    07/30/21 1331 07/30/21 1349   BP: 140/84 136/80   BP Location: Left arm    Patient Position: Sitting    Pulse: 76    Resp: 16    Temp: 97 9 °F (36 6 °C)    SpO2: 97%    Weight: 66 7 kg (147 lb)    Height: 5' 4 5" (1 638 m)      Body mass index is 24 84 kg/m²  Physical Exam  Constitutional:       General: She is not in acute distress  Appearance: Normal appearance  She is well-developed  She is obese  She is not ill-appearing  Eyes:      Extraocular Movements: Extraocular movements intact  Pupils: Pupils are equal, round, and reactive to light  Cardiovascular:      Rate and Rhythm: Normal rate and regular rhythm  Pulses: Normal pulses  Heart sounds: Normal heart sounds  No murmur heard  Pulmonary:      Effort: Pulmonary effort is normal       Breath sounds: Normal breath sounds  Musculoskeletal:      Right lower leg: No edema  Left lower leg: No edema  Neurological:      General: No focal deficit present  Mental Status: She is alert and oriented to person, place, and time  Mental status is at baseline  Psychiatric:         Mood and Affect: Mood normal          Behavior: Behavior normal          Thought Content:  Thought content normal

## 2021-07-30 ENCOUNTER — OFFICE VISIT (OUTPATIENT)
Dept: FAMILY MEDICINE CLINIC | Facility: CLINIC | Age: 66
End: 2021-07-30
Payer: COMMERCIAL

## 2021-07-30 VITALS
RESPIRATION RATE: 16 BRPM | BODY MASS INDEX: 24.49 KG/M2 | SYSTOLIC BLOOD PRESSURE: 136 MMHG | DIASTOLIC BLOOD PRESSURE: 80 MMHG | WEIGHT: 147 LBS | HEART RATE: 76 BPM | HEIGHT: 65 IN | TEMPERATURE: 97.9 F | OXYGEN SATURATION: 97 %

## 2021-07-30 DIAGNOSIS — I10 ESSENTIAL HYPERTENSION: Primary | ICD-10-CM

## 2021-07-30 PROCEDURE — 99213 OFFICE O/P EST LOW 20 MIN: CPT | Performed by: FAMILY MEDICINE

## 2021-08-13 ENCOUNTER — CONSULT (OUTPATIENT)
Dept: CARDIOLOGY CLINIC | Facility: CLINIC | Age: 66
End: 2021-08-13
Payer: COMMERCIAL

## 2021-08-13 VITALS
HEIGHT: 65 IN | TEMPERATURE: 99 F | HEART RATE: 76 BPM | SYSTOLIC BLOOD PRESSURE: 156 MMHG | BODY MASS INDEX: 25.12 KG/M2 | DIASTOLIC BLOOD PRESSURE: 96 MMHG | WEIGHT: 150.8 LBS | OXYGEN SATURATION: 96 %

## 2021-08-13 DIAGNOSIS — R94.31 ABNORMAL EKG: ICD-10-CM

## 2021-08-13 DIAGNOSIS — R06.00 EXERTIONAL DYSPNEA: Primary | ICD-10-CM

## 2021-08-13 DIAGNOSIS — E78.2 MIXED HYPERLIPIDEMIA: ICD-10-CM

## 2021-08-13 DIAGNOSIS — I10 ESSENTIAL HYPERTENSION: ICD-10-CM

## 2021-08-13 DIAGNOSIS — I10 HTN (HYPERTENSION), MALIGNANT: ICD-10-CM

## 2021-08-13 PROCEDURE — 99214 OFFICE O/P EST MOD 30 MIN: CPT | Performed by: INTERNAL MEDICINE

## 2021-08-13 RX ORDER — LOSARTAN POTASSIUM AND HYDROCHLOROTHIAZIDE 12.5; 1 MG/1; MG/1
1 TABLET ORAL DAILY
Qty: 90 TABLET | Refills: 1 | Status: SHIPPED | OUTPATIENT
Start: 2021-08-13 | End: 2021-09-17 | Stop reason: SDUPTHER

## 2021-08-13 NOTE — PATIENT INSTRUCTIONS
Low-Sodium Diet   WHAT YOU NEED TO KNOW:   A low-sodium diet limits foods that are high in sodium (salt)  You will need to follow a low-sodium diet if you have high blood pressure, kidney disease, or heart failure  You may also need to follow this diet if you have a condition that is causing your body to retain (hold) extra fluid  You may need to limit the amount of sodium you eat in a day to 1,500 to 2,000 mg  Ask your healthcare provider how much sodium you can have each day  DISCHARGE INSTRUCTIONS:   How to use food labels to choose foods that are low in sodium:  Read food labels to find the amount of sodium they contain  The amount of sodium is listed in milligrams (mg)  The % Daily Value (DV) column tells you how much of your daily needs are met by 1 serving of the food for each nutrient listed  Choose foods that have less than 5% of the DV of sodium  These foods are considered low in sodium  Foods that have 20% or more of the DV of sodium are considered high in sodium  Some food labels may also list any of the following terms that tell you about the sodium content in the food:  · Sodium-free:  Less than 5 mg in each serving    · Very low sodium:  35 mg of sodium or less in each serving    · Low sodium:  140 mg of sodium or less in each serving    · Reduced sodium: At least 25% less sodium in each serving than the regular type    · Light in sodium:  50% less sodium in each serving    · Unsalted or no added salt:  No extra salt is added during processing (the food may still contain sodium)     Foods to avoid:  Salty foods are high in sodium  You should avoid the following:  · Processed foods:      ? Mixes for cornbread, biscuits, cake, and pudding     ? Instant foods, such as potatoes, cereals, noodles, and rice     ? Packaged foods, such as bread stuffing, rice and pasta mixes, snack dip mixes, and macaroni and cheese     ?  Canned foods, such as canned vegetables, soups, broths, sauces, and vegetable or tomato juice    ? Snack foods, such as salted chips, popcorn, pretzels, pork rinds, salted crackers, and salted nuts    ? Frozen foods, such as dinners, entrees, vegetables with sauces, and breaded meats    ? Sauerkraut, pickled vegetables, and other foods prepared in brine    · Meats and cheeses:      ? Smoked or cured meat, such as corned beef, vaz, ham, hot dogs, and sausage    ? Canned meats or spreads, such as potted meats, sardines, anchovies, and imitation seafood    ? Deli or lunch meats, such as bologna, ham, turkey, and roast beef    ? Processed cheese, such as American cheese and cheese spreads    · Condiments, sauces, and seasonings:      ? Salt (¼ teaspoon of salt contains 575 mg of sodium)    ? Seasonings made with salt, such as garlic salt, celery salt, onion salt, and seasoned salt    ? Regular soy sauce, barbecue sauce, teriyaki sauce, steak sauce, Worcestershire sauce, and most flavored vinegars    ? Canned gravy and mixes     ? Regular condiments, such as mustard, ketchup, and salad dressings    ? Pickles and olives    ? Meat tenderizers and monosodium glutamate (MSG)    Foods to include:  Read the food label to find the amount of sodium in each serving  · Bread and cereal:  Try to choose breads with less than 80 mg of sodium per serving  ? Bread, roll, samuel, tortilla, or unsalted crackers  ? Ready-to-eat cereals with less than 5% DV of sodium (examples include shredded wheat and puffed rice)    ? Pasta    · Vegetables and fruits:      ? Unsalted fresh, frozen, or canned vegetables    ? Fresh, frozen, or canned fruits    ? Fruit juice    · Dairy:  One serving has about 150 mg of sodium  ? Milk, all types    ? Yogurt    ? Hard cheese, such as cheddar, Swiss, [employer], or mozzarella    · Meat and other protein foods:  Some raw meats may have added sodium  ? Plain meats, fish, and poultry     ? Egg    · Other foods:      ? Homemade pudding    ?  Unsalted nuts, popcorn, or pretzels    ? Unsalted butter or margarine    Ways to decrease sodium:   · Add spices and herbs to foods instead of salt during cooking  Use salt-free seasonings to add flavor to foods  Examples include onion powder, garlic powder, basil, moreno powder, paprika, and parsley  Try lemon or lime juice or vinegar to give foods a tart flavor  Use hot peppers, pepper, or cayenne pepper to add a spicy flavor  · Do not keep a salt shaker at your kitchen table  This may help keep you from adding salt to food at the table  A teaspoon of salt has 2,300 mg of sodium  It may take time to get used to enjoying the natural flavor of food instead of adding salt  Talk to your healthcare provider before you use salt substitutes  Some salt substitutes have a high amount of potassium and need to be avoided if you have kidney disease  · Choose low-sodium foods at restaurants  Meals from restaurants are often high in sodium  Some restaurants have nutrition information on the menu that tells you the amount of sodium in their foods  If possible, ask for your food to be prepared with less, or no salt  · Shop for unsalted or low-sodium foods and snacks at the grocery store  Examples include unsalted or low-sodium broths, soups, and canned vegetables  Choose fresh or frozen vegetables instead  Choose unsalted nuts or seeds or fresh fruits or vegetables as snacks  Read food labels and choose salt-free, very low-sodium, or low-sodium foods  © Copyright [employer] 2021 Information is for End User's use only and may not be sold, redistributed or otherwise used for commercial purposes  All illustrations and images included in CareNotes® are the copyrighted property of A [employer]  or Kathie Garcia   The above information is an  only  It is not intended as medical advice for individual conditions or treatments   Talk to your doctor, nurse or pharmacist before following any medical regimen to see if it is safe and effective for you

## 2021-08-13 NOTE — PROGRESS NOTES
[facility]  [address] [place] [address] [address]   [place], [address]  Cardiology Consultation    Kimi Coreas  [id number]  1955      Consult for:Dyspnea/Htn  Appreciate consult by: Marialuisa Beltran MD    1  Exertional dyspnea  Stress test only, exercise   2  Abnormal EKG  Ambulatory referral to Cardiology    Stress test only, exercise   3  Mixed hyperlipidemia  Stress test only, exercise   4  Essential hypertension  losartan-hydrochlorothiazide (HYZAAR) 100-12 5 MG per tablet   5  HTN (hypertension), malignant        Discussion/Summary:   Exertional shortness of breath/malignant hypertension- recent ER visit secondary to elevated blood pressure  Her blood pressure has been improved since starting losartan 100 mg and hydrochlorothiazide 12 5 mg  She still has some periodic elevated numbers  She is also on Coreg 12 5 mg twice a day  She is trying to watch her sodium intake  No evidence of volume overload on examination  Her blood pressure here was 156/96  She states better at home  We will have her do an exercise treadmill stress test to evaluate  Discussed about a 2 g sodium diet  Possible addition of an aldosterone inhibitor with persistent elevated blood pressure  Negative screen her for sleep apnea  Hyperlipidemia/elevated LFTs- currently on atorvastatin 10 mg  Monitoring of her LFTs  Her last CT was negative for evidence for non alcoholic steatohepatitis  Family history for CVA- previous EKGs negative for atrial fibrillation  Previous Holter monitor was also negative  HPI:   70-year-old woman with family history for CVA, hypertension, hyperlipidemia presents with recent ER visit secondary to dizziness found to have hypertensive urgency  She states her blood pressures have been better since her medication regimen has been changed  She is trying to watch her sodium intake  She has previously had some exertional shortness of breath    She has also had some chest discomfort which she believes was a pulled muscle  She has been compliant with her medications  She denies having lower extremity swelling  She denies having fevers or chills  PMH  hld- atorvastatin  Htn- bp meds    Social Hx    Retired  [employer]  Not formal exercise  No smoking  Cut out beer  Sleep- no snoring, no daytime fatigue    Family Hx  Grandfather- MI  Mother- stroke/htn      Past Medical History:   Diagnosis Date    Diverticulitis of colon     Fatty liver 2021    Glaucoma     Hypercholesteremia     Hypertension      Social History     Socioeconomic History    Marital status: /Civil Union     Spouse name: Not on file    Number of children: Not on file    Years of education: Not on file    Highest education level: Not on file   Occupational History    Not on file   Tobacco Use    Smoking status: Never Smoker    Smokeless tobacco: Never Used   Vaping Use    Vaping Use: Never used   Substance and Sexual Activity    Alcohol use: Not Currently    Drug use: Never    Sexual activity: Not on file   Other Topics Concern    Not on file   Social History Narrative    Tobacco smoking status:   Never smoker        Most recent tobacco use screenin2018          Alcohol intake: Moderate    Per jackie     Social Determinants of Health     Financial Resource Strain:     Difficulty of Paying Living Expenses:    Food Insecurity:     Worried About Running Out of Food in the Last Year:     [address] in the Last Year:    Transportation Needs:     Lack of Transportation (Medical):      Lack of Transportation (Non-Medical):    Physical Activity:     Days of Exercise per Week:     Minutes of Exercise per Session:    Stress:     Feeling of Stress :    Social Connections:     Frequency of Communication with Friends and Family:     Frequency of Social Gatherings with Friends and Family:     Attends Rastafarian Services:     Active Member of Clubs or Organizations:     Attends Club or Organization Meetings:     Marital Status:    Intimate Partner Violence:     Fear of Current or Ex-Partner:     Emotionally Abused:     Physically Abused:     Sexually Abused:       Family History   Problem Relation Age of Onset    Hyperlipidemia Mother     Hypertension Mother     Other Mother         cerebrovascular accident    Stroke Mother    Cassi Lange Lung cancer Father     No Known Problems Brother     No Known Problems Maternal Aunt     No Known Problems Maternal Uncle     Breast cancer Paternal Aunt     No Known Problems Paternal Uncle     No Known Problems Maternal Grandmother     No Known Problems Maternal Grandfather     No Known Problems Paternal Grandmother     No Known Problems Paternal Grandfather     No Known Problems Brother     ADD / ADHD Neg Hx     Anesthesia problems Neg Hx     Cancer Neg Hx     Clotting disorder Neg Hx     Collagen disease Neg Hx     Diabetes Neg Hx     Dislocations Neg Hx     Learning disabilities Neg Hx     Neurological problems Neg Hx     Osteoporosis Neg Hx     Rheumatologic disease Neg Hx     Scoliosis Neg Hx     Vascular Disease Neg Hx      Past Surgical History:   Procedure Laterality Date    BREAST CYST EXCISION Right     benign - 2000 approx      COLONOSCOPY  10/11/2016    MAMMO (HISTORICAL)  12/14/2018,11/10/17    TONSILLECTOMY         Current Outpatient Medications:     ALPRAZolam (XANAX) 0 25 mg tablet, Take 1 tablet (0 25 mg total) by mouth daily at bedtime as needed for anxiety, Disp: 30 tablet, Rfl: 0    atorvastatin (LIPITOR) 10 mg tablet, take 1 tablet by mouth once daily, Disp: 90 tablet, Rfl: 2    carvedilol (COREG) 12 5 mg tablet, Take 1 tablet (12 5 mg total) by mouth 2 (two) times a day with meals, Disp: 60 tablet, Rfl: 5    losartan-hydrochlorothiazide (HYZAAR) 100-12 5 MG per tablet, Take 1 tablet by mouth daily, Disp: 30 tablet, Rfl: 0    timolol (BETIMOL) 0 5 % ophthalmic solution, Administer 1 drop to both eyes daily, Disp: , Rfl:     escitalopram (LEXAPRO) 10 mg tablet, Take 1 tablet (10 mg total) by mouth daily (Patient not taking: Reported on 8/13/2021), Disp: 30 tablet, Rfl: 5  Allergies   Allergen Reactions    Bee Venom Swelling     Vitals:    08/13/21 1119   BP: 156/96   BP Location: Left arm   Patient Position: Sitting   Cuff Size: Standard   Pulse: 76   Temp: 99 °F (37 2 °C)   TempSrc: Temporal   SpO2: 96%   Weight: 68 4 kg (150 lb 12 8 oz)   Height: 5' 4 5" (1 638 m)       Review of Systems:   Review of Systems   Constitutional: Negative  Negative for activity change, appetite change, chills, diaphoresis, fatigue, fever and unexpected weight change  HENT: Negative  Negative for congestion, dental problem, drooling, ear discharge, ear pain, facial swelling, hearing loss, mouth sores, nosebleeds, postnasal drip, rhinorrhea, sinus pressure, sinus pain, sneezing, sore throat, tinnitus, trouble swallowing and voice change  Eyes: Negative  Negative for photophobia, pain, redness, itching and visual disturbance  Respiratory: Positive for shortness of breath  Negative for apnea, cough, choking, chest tightness, wheezing and stridor  Cardiovascular: Positive for chest pain and palpitations  Negative for leg swelling  Gastrointestinal: Negative  Negative for abdominal distention, abdominal pain, anal bleeding, blood in stool, constipation, diarrhea, nausea, rectal pain and vomiting  Endocrine: Negative  Negative for cold intolerance, heat intolerance, polydipsia, polyphagia and polyuria  Genitourinary: Negative  Negative for decreased urine volume, difficulty urinating, dyspareunia, dysuria, enuresis, flank pain, frequency, genital sores, hematuria, menstrual problem, pelvic pain, urgency, vaginal bleeding, vaginal discharge and vaginal pain  Musculoskeletal: Negative    Negative for arthralgias, back pain, gait problem, joint swelling, myalgias, neck pain and neck stiffness  Skin: Negative  Negative for color change, pallor, rash and wound  Allergic/Immunologic: Negative  Negative for environmental allergies, food allergies and immunocompromised state  Neurological: Positive for dizziness  Negative for tremors, seizures, syncope, facial asymmetry, speech difficulty, weakness, light-headedness, numbness and headaches  Hematological: Negative  Negative for adenopathy  Does not bruise/bleed easily  Psychiatric/Behavioral: Negative  Negative for agitation, behavioral problems, confusion, decreased concentration, dysphoric mood, hallucinations, self-injury, sleep disturbance and suicidal ideas  The patient is not nervous/anxious and is not hyperactive  All other systems reviewed and are negative  Vitals:    08/13/21 1119   BP: 156/96   BP Location: Left arm   Patient Position: Sitting   Cuff Size: Standard   Pulse: 76   Temp: 99 °F (37 2 °C)   TempSrc: Temporal   SpO2: 96%   Weight: 68 4 kg (150 lb 12 8 oz)   Height: 5' 4 5" (1 638 m)     Physical Examination:   Physical Exam  Constitutional:       General: She is not in acute distress  Appearance: She is well-developed  She is not diaphoretic  HENT:      Head: Normocephalic and atraumatic  Right Ear: External ear normal       Left Ear: External ear normal    Eyes:      General: No scleral icterus  Right eye: No discharge  Left eye: No discharge  Conjunctiva/sclera: Conjunctivae normal       Pupils: Pupils are equal, round, and reactive to light  Neck:      Thyroid: No thyromegaly  Vascular: No JVD  Trachea: No tracheal deviation  Cardiovascular:      Rate and Rhythm: Normal rate and regular rhythm  Heart sounds: No murmur heard  No friction rub  Gallop present  Pulmonary:      Effort: Pulmonary effort is normal  No respiratory distress  Breath sounds: Normal breath sounds  No stridor  No wheezing or rales     Chest:      Chest wall: No tenderness  Abdominal:      General: Bowel sounds are normal  There is no distension  Palpations: Abdomen is soft  There is no mass  Tenderness: There is no abdominal tenderness  There is no guarding or rebound  Musculoskeletal:         General: No tenderness or deformity  Normal range of motion  Cervical back: Normal range of motion and neck supple  Skin:     General: Skin is warm and dry  Coloration: Skin is not pale  Findings: No erythema or rash  Neurological:      Mental Status: She is alert and oriented to person, place, and time  Cranial Nerves: No cranial nerve deficit  Motor: No abnormal muscle tone  Coordination: Coordination normal       Deep Tendon Reflexes: Reflexes are normal and symmetric  Reflexes normal    Psychiatric:         Behavior: Behavior normal          Thought Content:  Thought content normal          Judgment: Judgment normal          Labs:     Lab Results   Component Value Date    WBC 6 02 07/22/2021    HGB 13 1 07/22/2021    HCT 40 4 07/22/2021    MCV 91 07/22/2021    RDW 12 6 07/22/2021     07/22/2021     BMP:  Lab Results   Component Value Date    SODIUM 140 07/29/2021    K 3 8 07/29/2021     07/29/2021    CO2 31 07/29/2021    BUN 19 07/29/2021    CREATININE 0 81 07/29/2021    GLUC 110 07/29/2021    CALCIUM 9 1 07/29/2021    EGFR 76 07/29/2021    MG 1 9 07/22/2021     LFT:  Lab Results   Component Value Date    AST 56 (H) 07/22/2021    ALT 99 (H) 07/22/2021    ALKPHOS 48 07/22/2021    TP 7 5 07/22/2021    ALB 4 0 07/22/2021      No results found for: [facility]  Lab Results   Component Value Date    HGBA1C 5 1 10/30/2020     Lipid Profile:   Lab Results   Component Value Date    CHOLESTEROL 159 10/30/2020    HDL 72 10/30/2020    LDLCALC 73 10/30/2020    TRIG 48 10/30/2020     Lab Results   Component Value Date    CHOLESTEROL 159 10/30/2020    CHOLESTEROL 226 (H) 08/24/2020     Lab Results   Component Value Date    TROPONINI <0 02 07/22/2021     Lab Results   Component Value Date    NTBNP 501 (H) 07/22/2021      Recent Results (from the past 672 hour(s))   ECG 12 lead    Collection Time: 07/22/21  6:59 PM   Result Value Ref Range    Ventricular Rate 77 BPM    Atrial Rate 77 BPM    VA Interval 152 ms    QRSD Interval 78 ms    QT Interval 376 ms    QTC Interval 425 ms    P Axis 15 degrees    QRS Axis 20 degrees    T Wave Axis 17 degrees   CBC and differential    Collection Time: 07/22/21  7:07 PM   Result Value Ref Range    WBC 6 02 4 31 - 10 16 Thousand/uL    RBC 4 44 3 81 - 5 12 Million/uL    Hemoglobin 13 1 11 5 - 15 4 g/dL    Hematocrit 40 4 34 8 - 46 1 %    MCV 91 82 - 98 fL    MCH 29 5 26 8 - 34 3 pg    MCHC 32 4 31 4 - 37 4 g/dL    RDW 12 6 11 6 - 15 1 %    MPV 9 5 8 9 - 12 7 fL    Platelets 875 770 - 944 Thousands/uL    nRBC 0 /100 WBCs    Neutrophils Relative 59 43 - 75 %    Immat GRANS % 0 0 - 2 %    Lymphocytes Relative 30 14 - 44 %    Monocytes Relative 8 4 - 12 %    Eosinophils Relative 2 0 - 6 %    Basophils Relative 1 0 - 1 %    Neutrophils Absolute 3 51 1 85 - 7 62 Thousands/µL    Immature Grans Absolute 0 02 0 00 - 0 20 Thousand/uL    Lymphocytes Absolute 1 82 0 60 - 4 47 Thousands/µL    Monocytes Absolute 0 48 0 17 - 1 22 Thousand/µL    Eosinophils Absolute 0 14 0 00 - 0 61 Thousand/µL    Basophils Absolute 0 05 0 00 - 0 10 Thousands/µL   Comprehensive metabolic panel    Collection Time: 07/22/21  7:07 PM   Result Value Ref Range    Sodium 142 136 - 145 mmol/L    Potassium 3 5 3 5 - 5 3 mmol/L    Chloride 103 100 - 108 mmol/L    CO2 30 21 - 32 mmol/L    ANION GAP 9 4 - 13 mmol/L    BUN 10 5 - 25 mg/dL    Creatinine 0 74 0 60 - 1 30 mg/dL    Glucose 100 65 - 140 mg/dL    Calcium 8 8 8 3 - 10 1 mg/dL    AST 56 (H) 5 - 45 U/L    ALT 99 (H) 12 - 78 U/L    Alkaline Phosphatase 48 46 - 116 U/L    Total Protein 7 5 6 4 - 8 2 g/dL    Albumin 4 0 3 5 - 5 0 g/dL    Total Bilirubin 0 53 0 20 - 1 00 mg/dL    eGFR 85 ml/min/1 73sq m Magnesium    Collection Time: 07/22/21  7:07 PM   Result Value Ref Range    Magnesium 1 9 1 6 - 2 6 mg/dL   Troponin I    Collection Time: 07/22/21  7:07 PM   Result Value Ref Range    Troponin I <0 02 <=0 04 ng/mL   NT-BNP PRO    Collection Time: 07/22/21  7:07 PM   Result Value Ref Range    NT-proBNP 501 (H) <125 pg/mL   Basic metabolic panel    Collection Time: 07/29/21  1:44 PM   Result Value Ref Range    Sodium 140 136 - 145 mmol/L    Potassium 3 8 3 5 - 5 3 mmol/L    Chloride 102 100 - 108 mmol/L    CO2 31 21 - 32 mmol/L    ANION GAP 7 4 - 13 mmol/L    BUN 19 5 - 25 mg/dL    Creatinine 0 81 0 60 - 1 30 mg/dL    Glucose 110 65 - 140 mg/dL    Calcium 9 1 8 3 - 10 1 mg/dL    eGFR 76 ml/min/1 73sq m       Imaging & Testing   I have personally reviewed pertinent reports  Cardiac Testing     EKG: Personally reviewed  Reviewed previous EKG normal sinus rhythm no acute ST changes      Coretta Vaughan MD [facility]  [phone]  Please call with any questions or suggestions    Counseling :  A description of the counseling:   Goals and Barriers:  Patient's ability to self care:  Medication side effect reviewed with patient in detail and all their questions answered  "Portions of the record may have been created with voice recognition software  Occasional wrong word or "sound a like" substitutions may have occurred due to the inherent limitations of voice recognition software  Read the chart carefully and recognize, using context, where substitutions have occurred   Please call if you have any questions  "

## 2021-08-16 ENCOUNTER — HOSPITAL ENCOUNTER (OUTPATIENT)
Dept: NON INVASIVE DIAGNOSTICS | Facility: HOSPITAL | Age: 66
Discharge: HOME | End: 2021-08-16
Attending: INTERNAL MEDICINE
Payer: COMMERCIAL

## 2021-08-16 DIAGNOSIS — R94.31 ABNORMAL EKG: ICD-10-CM

## 2021-08-16 DIAGNOSIS — E78.2 MIXED HYPERLIPIDEMIA: ICD-10-CM

## 2021-08-16 DIAGNOSIS — R06.00 EXERTIONAL DYSPNEA: ICD-10-CM

## 2021-08-16 LAB
CHEST PAIN STATEMENT: NORMAL
MAX DIASTOLIC BP: 80 MMHG
MAX HEART RATE: 130 BPM
MAX PREDICTED HEART RATE: 155 BPM
MAX. SYSTOLIC BP: 170 MMHG
PROTOCOL NAME: NORMAL
REASON FOR TERMINATION: NORMAL
TARGET HR FORMULA: NORMAL
TIME IN EXERCISE PHASE: NORMAL

## 2021-08-16 PROCEDURE — 93017 CV STRESS TEST TRACING ONLY: CPT

## 2021-08-17 PROCEDURE — 93016 CV STRESS TEST SUPVJ ONLY: CPT | Performed by: INTERNAL MEDICINE

## 2021-08-17 PROCEDURE — 93018 CV STRESS TEST I&R ONLY: CPT | Performed by: INTERNAL MEDICINE

## 2021-08-18 ENCOUNTER — TELEPHONE (OUTPATIENT)
Dept: PREADMISSION TESTING | Facility: HOSPITAL | Age: 66
End: 2021-08-18

## 2021-08-18 ENCOUNTER — TELEPHONE (OUTPATIENT)
Dept: CARDIOLOGY CLINIC | Facility: CLINIC | Age: 66
End: 2021-08-18

## 2021-08-18 NOTE — TELEPHONE ENCOUNTER
Patient called today seeking first results of stress test as well as seeking clearance for colonoscopy scheduled for next Wednesday 8/25

## 2021-08-19 PROCEDURE — U0005 INFEC AGEN DETEC AMPLI PROBE: HCPCS | Performed by: INTERNAL MEDICINE

## 2021-08-19 PROCEDURE — U0003 INFECTIOUS AGENT DETECTION BY NUCLEIC ACID (DNA OR RNA); SEVERE ACUTE RESPIRATORY SYNDROME CORONAVIRUS 2 (SARS-COV-2) (CORONAVIRUS DISEASE [COVID-19]), AMPLIFIED PROBE TECHNIQUE, MAKING USE OF HIGH THROUGHPUT TECHNOLOGIES AS DESCRIBED BY CMS-2020-01-R: HCPCS | Performed by: INTERNAL MEDICINE

## 2021-08-20 VITALS — HEIGHT: 65 IN | BODY MASS INDEX: 24.99 KG/M2 | WEIGHT: 150 LBS

## 2021-08-20 RX ORDER — MULTIVIT WITH MINERALS/LUTEIN
1000 TABLET ORAL EVERY MORNING
COMMUNITY

## 2021-08-20 RX ORDER — MULTIVIT-MIN/IRON/FOLIC ACID/K 18-600-40
CAPSULE ORAL EVERY MORNING
COMMUNITY

## 2021-08-20 RX ORDER — MULTIVITAMIN WITH IRON
1 TABLET ORAL EVERY MORNING
COMMUNITY

## 2021-08-20 RX ORDER — DIPHENOXYLATE HYDROCHLORIDE AND ATROPINE SULFATE 2.5; .025 MG/1; MG/1
1 TABLET ORAL EVERY MORNING
COMMUNITY

## 2021-08-20 NOTE — PRE-PROCEDURE INSTRUCTIONS
Pre-Surgery Instructions:   Medication Instructions    ALPRAZolam (XANAX) 0 25 mg tablet Instructed patient per Anesthesia Guidelines   Ascorbic Acid (vitamin C) 1000 MG tablet Instructed patient per Anesthesia Guidelines   atorvastatin (LIPITOR) 10 mg tablet Instructed patient per Anesthesia Guidelines   B Complex-C (B-complex with vitamin C) tablet Instructed patient per Anesthesia Guidelines   bisacodyl (DULCOLAX) 5 mg EC tablet Patient was instructed by Physician and understands   Calcium Carb-Cholecalciferol (CALTRATE 600+D3 PO) Instructed patient per Anesthesia Guidelines   carvedilol (COREG) 12 5 mg tablet Pt to take the am of the procedure    Cholecalciferol (Vitamin D) 50 MCG (2000 UT) CAPS Instructed patient per Anesthesia Guidelines   escitalopram (LEXAPRO) 10 mg tablet Instructed patient per Anesthesia Guidelines   Glucosamine-Chondroitin-MSM (GLUCOSAMINE CHONDROIT MSM DS PO) Instructed patient per Anesthesia Guidelines   Lactobacillus-Inulin (CULTURELLE DIGESTIVE DAILY PO) Instructed patient per Anesthesia Guidelines   losartan-hydrochlorothiazide (HYZAAR) 100-12 5 MG per tablet Instructed patient per Anesthesia Guidelines   magnesium citrate solution Patient was instructed by Physician and understands   multivitamin (THERAGRAN) TABS Instructed patient per Anesthesia Guidelines   Polyethylene Glycol 3350 (MIRALAX PO) Patient was instructed by Physician and understands   timolol (BETIMOL) 0 5 % ophthalmic solution Pt to instill the am of the procedure    TURMERIC CURCUMIN PO Last dose 8/19/21    Pt to follow Dr Marcine Gowers instructions  Pt to take carvedilol, timolol the am of the procedure    [phone] or Taz Backers [phone], C# [phone]

## 2021-08-25 ENCOUNTER — HOSPITAL ENCOUNTER (OUTPATIENT)
Dept: GASTROENTEROLOGY | Facility: AMBULARY SURGERY CENTER | Age: 66
Setting detail: OUTPATIENT SURGERY
Discharge: HOME | End: 2021-08-25
Attending: INTERNAL MEDICINE | Admitting: INTERNAL MEDICINE
Payer: COMMERCIAL

## 2021-08-25 ENCOUNTER — ANESTHESIA (OUTPATIENT)
Dept: GASTROENTEROLOGY | Facility: AMBULARY SURGERY CENTER | Age: 66
End: 2021-08-25

## 2021-08-25 ENCOUNTER — ANESTHESIA EVENT (OUTPATIENT)
Dept: GASTROENTEROLOGY | Facility: AMBULARY SURGERY CENTER | Age: 66
End: 2021-08-25

## 2021-08-25 VITALS
WEIGHT: 150 LBS | OXYGEN SATURATION: 100 % | BODY MASS INDEX: 24.99 KG/M2 | TEMPERATURE: 98 F | DIASTOLIC BLOOD PRESSURE: 71 MMHG | HEART RATE: 68 BPM | RESPIRATION RATE: 18 BRPM | SYSTOLIC BLOOD PRESSURE: 154 MMHG | HEIGHT: 65 IN

## 2021-08-25 DIAGNOSIS — Z87.19 HX OF DIVERTICULITIS OF COLON: ICD-10-CM

## 2021-08-25 PROCEDURE — 88305 TISSUE EXAM BY PATHOLOGIST: CPT | Performed by: PATHOLOGY

## 2021-08-25 PROCEDURE — 45380 COLONOSCOPY AND BIOPSY: CPT | Performed by: INTERNAL MEDICINE

## 2021-08-25 RX ORDER — PROPOFOL 10 MG/ML
INJECTION, EMULSION INTRAVENOUS AS NEEDED
Status: DISCONTINUED | OUTPATIENT
Start: 2021-08-25 | End: 2021-08-25

## 2021-08-25 RX ORDER — PROPOFOL 10 MG/ML
INJECTION, EMULSION INTRAVENOUS CONTINUOUS PRN
Status: DISCONTINUED | OUTPATIENT
Start: 2021-08-25 | End: 2021-08-25

## 2021-08-25 RX ORDER — SODIUM CHLORIDE, SODIUM LACTATE, POTASSIUM CHLORIDE, CALCIUM CHLORIDE 600; 310; 30; 20 MG/100ML; MG/100ML; MG/100ML; MG/100ML
INJECTION, SOLUTION INTRAVENOUS CONTINUOUS PRN
Status: DISCONTINUED | OUTPATIENT
Start: 2021-08-25 | End: 2021-08-25

## 2021-08-25 RX ADMIN — SODIUM CHLORIDE, SODIUM LACTATE, POTASSIUM CHLORIDE, AND CALCIUM CHLORIDE: .6; .31; .03; .02 INJECTION, SOLUTION INTRAVENOUS at 11:42

## 2021-08-25 RX ADMIN — PROPOFOL 30 MG: 10 INJECTION, EMULSION INTRAVENOUS at 12:03

## 2021-08-25 RX ADMIN — PROPOFOL 80 MG: 10 INJECTION, EMULSION INTRAVENOUS at 11:55

## 2021-08-25 RX ADMIN — PROPOFOL 150 MCG/KG/MIN: 10 INJECTION, EMULSION INTRAVENOUS at 11:55

## 2021-08-25 NOTE — DISCHARGE SUMMARY
Discharge Summary - Geovanni Muse 72 y o  female MRN: [id number]    Unit/Bed#:  Encounter: [id number]    Admission Date:  08/25/2021    Admitting Diagnosis: Hx of diverticulitis of colon [Z87 19]    HPI:  History of diverticulosis and diverticulitis  Procedures Performed: No orders of the defined types were placed in this encounter  Summary of Hospital Course:  Colonoscopy done  See report    Significant Findings, Care, Treatment and Services Provided:  Mild diverticulosis  Questionable colon polyp  Complications:  None    Discharge Diagnosis:  See above    Medical Problems     Resolved Problems  Date Reviewed: 7/30/2021    None                Condition at Discharge: good         Discharge instructions/Information to patient and family:   See after visit summary for information provided to patient and family  Provisions for Follow-Up Care:  See after visit summary for information related to follow-up care and any pertinent home health orders        PCP: Mallory Carpenter MD    Disposition: Home

## 2021-08-25 NOTE — H&P
History and Physical -  [facility]  Michelle Catherine 72 y o  female MRN: [id number]                  HPI: Michelle Catherine is a 72y o  year old female who presents for colonoscopy  She has history of change in bowel habits with diarrhea  Crampy abdominal pain  REVIEW OF SYSTEMS: Per the HPI, and otherwise unremarkable  Historical Information   Past Medical History:   Diagnosis Date    Anxiety     Diverticulitis of colon     Fatty liver 6/17/2021    Glaucoma     H/O cardiovascular stress test 08/16/2021    Dr Galindo Echols     Hypertension     Osteopenia      Past Surgical History:   Procedure Laterality Date    BREAST CYST EXCISION Right     benign - 2000 approx   COLONOSCOPY  2016    FOOT SURGERY Right     removal of cat tooth removed    MAMMO (HISTORICAL)  12/14/2018,11/10/17    TONSILLECTOMY      TOOTH EXTRACTION       Social History   Social History     Substance and Sexual Activity   Alcohol Use Not Currently     Social History     Substance and Sexual Activity   Drug Use Never     Social History     Tobacco Use   Smoking Status Never Smoker   Smokeless Tobacco Never Used     Family History   Problem Relation Age of Onset    Hyperlipidemia Mother     Hypertension Mother     Other Mother         cerebrovascular accident   Zenaida Maradiaga Stroke Mother    Zenaida Maradiaga Lung cancer Father     No Known Problems Brother     No Known Problems Maternal Aunt     No Known Problems Maternal Uncle     Breast cancer Paternal Aunt     No Known Problems Paternal Uncle     No Known Problems Maternal Grandmother     Heart disease Maternal Grandfather     Early death Maternal Grandfather 45        massive MI-congenital defect?     No Known Problems Paternal Grandmother     No Known Problems Paternal Grandfather     No Known Problems Brother     ADD / ADHD Neg Hx     Anesthesia problems Neg Hx     Cancer Neg Hx     Clotting disorder Neg Hx     Collagen disease Neg Hx  Diabetes Neg Hx     Dislocations Neg Hx     Learning disabilities Neg Hx     Neurological problems Neg Hx     Osteoporosis Neg Hx     Rheumatologic disease Neg Hx     Scoliosis Neg Hx     Vascular Disease Neg Hx        Meds/Allergies       Current Outpatient Medications:     carvedilol (COREG) 12 5 mg tablet    ALPRAZolam (XANAX) 0 25 mg tablet    Ascorbic Acid (vitamin C) 1000 MG tablet    atorvastatin (LIPITOR) 10 mg tablet    B Complex-C (B-complex with vitamin C) tablet    bisacodyl (DULCOLAX) 5 mg EC tablet    Calcium Carb-Cholecalciferol (CALTRATE 600+D3 PO)    Cholecalciferol (Vitamin D) 50 MCG (2000 UT) CAPS    escitalopram (LEXAPRO) 10 mg tablet    Glucosamine-Chondroitin-MSM (GLUCOSAMINE CHONDROIT MSM DS PO)    Lactobacillus-Inulin (CULTURELLE DIGESTIVE DAILY PO)    losartan-hydrochlorothiazide (HYZAAR) 100-12 5 MG per tablet    magnesium citrate solution    multivitamin (THERAGRAN) TABS    Polyethylene Glycol 3350 (MIRALAX PO)    timolol (BETIMOL) 0 5 % ophthalmic solution    TURMERIC CURCUMIN PO  No current facility-administered medications for this encounter  Facility-Administered Medications Ordered in Other Encounters:     lactated ringers infusion, , Intravenous, Continuous PRN, New Bag at 08/25/21 1142    Allergies   Allergen Reactions    Bee Venom Swelling       Objective     /74   Pulse 70   Temp 98 °F (36 7 °C) (Tympanic)   Resp 14   Ht 5' 4 5" (1 638 m)   Wt 68 kg (150 lb)   SpO2 98%   BMI 25 35 kg/m²       PHYSICAL EXAM    Gen: NAD  Head: NCAT  CV: RRR  CHEST: Clear  ABD: soft, NT/ND  EXT: no edema      ASSESSMENT/PLAN:  This is a 72y o  year old female here for colonoscopy, and she is stable and optimized for her procedure

## 2021-08-25 NOTE — DISCHARGE INSTRUCTIONS
Colonoscopy   WHAT YOU NEED TO KNOW:   A colonoscopy is a procedure to examine the inside of your colon (intestine) with a scope  Polyps or tissue growths may have been removed during your colonoscopy  It is normal to feel bloated and to have some abdominal discomfort  You should be passing gas  If you have hemorrhoids or you had polyps removed, you may have a small amount of bleeding  DISCHARGE INSTRUCTIONS:   Call your doctor if:   · You have a large amount of bright red blood in your bowel movements  · Your abdomen is hard and firm and you have severe pain  · You have sudden trouble breathing  · You develop a rash or hives  · You have a fever within 24 hours of your procedure  · You have not had a bowel movement for 3 days after your procedure  · You have questions or concerns about your condition or care  After your colonoscopy:   · Do not lift, strain, or run  for 3 days  · Rest as much as possible  You have been given medicine to relax you  Do not  drive or make important decisions for at least 24 hours  Return to your normal activity as directed  · Relieve gas and discomfort from bloating  by lying on your left side with a heating pad on your abdomen  You may need to take short walks to help the gas move out  Eat small meals until bloating is relieved  If you had polyps removed: For 7 days after your procedure:  · Do not  take aspirin  · Do not  go on long car rides  Help prevent constipation:   · Eat a variety of healthy foods  Healthy foods include fruit, vegetables, whole-grain breads, low-fat dairy products, beans, lean meat, and fish  Ask if you need to be on a special diet  Your healthcare provider may recommend that you eat high-fiber foods such as cooked beans  Fiber helps you have regular bowel movements  · Drink liquids as directed  Adults should drink between 9 and 13 eight-ounce cups of liquid every day  Ask what amount is best for you   For most people, good liquids to drink are water, juice, and milk  · Exercise as directed  Talk to your healthcare provider about the best exercise plan for you  Exercise can help prevent constipation, decrease your blood pressure and improve your health  Follow up with your healthcare provider as directed:  Write down your questions so you remember to ask them during your visits  © Copyright [employer] 2021 Information is for End User's use only and may not be sold, redistributed or otherwise used for commercial purposes  All illustrations and images included in CareNotes® are the copyrighted property of [employer]  or [address] [place], [place] [address]brannon loni   The above information is an  only  It is not intended as medical advice for individual conditions or treatments  Talk to your doctor, nurse or pharmacist before following any medical regimen to see if it is safe and effective for you

## 2021-08-25 NOTE — INTERVAL H&P NOTE
H&P reviewed  After examining the patient I find no changes in the patients condition since the H&P had been written      Vitals:    08/25/21 1104   BP: 157/74   Pulse: 70   Resp: 14   Temp: 98 °F (36 7 °C)   SpO2: 98%

## 2021-08-25 NOTE — ANESTHESIA PREPROCEDURE EVALUATION
Procedure:  COLONOSCOPY    Relevant Problems   CARDIO   (+) Essential hypertension      GI/HEPATIC   (+) Fatty liver      NEURO/PSYCH   (+) Anxiety state   (+) Generalized anxiety disorder        Physical Exam    Airway    Mallampati score: II  TM Distance: >3 FB  Neck ROM: full     Dental   No notable dental hx     Cardiovascular  Cardiovascular exam normal    Pulmonary  Pulmonary exam normal     Other Findings        Anesthesia Plan  ASA Score- 2     Anesthesia Type- IV sedation with anesthesia with ASA Monitors  Additional Monitors:   Airway Plan:           Plan Factors-Exercise tolerance (METS): >4 METS  Chart reviewed  Imaging results reviewed  Existing labs reviewed  Patient summary reviewed  Patient is not a current smoker  Induction-     Postoperative Plan-     Informed Consent- Anesthetic plan and risks discussed with patient  I personally reviewed this patient with the CRNA  Discussed and agreed on the Anesthesia Plan with the CRNA  Neville Mensah

## 2021-08-25 NOTE — ANESTHESIA POSTPROCEDURE EVALUATION
Post-Op Assessment Note    CV Status:  Stable       Mental Status:  Sleepy   Hydration Status:  Stable   PONV Controlled:  Controlled   Airway Patency:  Patent      Post Op Vitals Reviewed: Yes      Staff: CRNA         No complications documented      BP   112/63   Temp      Pulse  64   Resp   20   SpO2   99

## 2021-09-13 ENCOUNTER — TELEPHONE (OUTPATIENT)
Dept: FAMILY MEDICINE CLINIC | Facility: CLINIC | Age: 66
End: 2021-09-13

## 2021-09-13 NOTE — TELEPHONE ENCOUNTER
Patient has some lightheadedness and tired  No difficulty breathing  She worries about bronchitis  (She is coming to see you on Thursday) Wants to know if she should take Claritin?

## 2021-09-14 DIAGNOSIS — Z03.818 ENCOUNTER FOR OBSERVATION FOR SUSPECTED EXPOSURE TO OTHER BIOLOGICAL AGENTS RULED OUT: Primary | ICD-10-CM

## 2021-09-14 PROCEDURE — U0003 INFECTIOUS AGENT DETECTION BY NUCLEIC ACID (DNA OR RNA); SEVERE ACUTE RESPIRATORY SYNDROME CORONAVIRUS 2 (SARS-COV-2) (CORONAVIRUS DISEASE [COVID-19]), AMPLIFIED PROBE TECHNIQUE, MAKING USE OF HIGH THROUGHPUT TECHNOLOGIES AS DESCRIBED BY CMS-2020-01-R: HCPCS | Performed by: FAMILY MEDICINE

## 2021-09-14 PROCEDURE — U0005 INFEC AGEN DETEC AMPLI PROBE: HCPCS | Performed by: FAMILY MEDICINE

## 2021-09-14 NOTE — PROGRESS NOTES
Assessment/Plan:         Problem List Items Addressed This Visit        Cardiovascular and Mediastinum    Essential hypertension     Well controlled            Other    Acute knee pain     No pain but feels tightness in knee worried about clot but no evidence on exam for this         Generalized anxiety disorder     Pt still gets very anxious and her pressure goes up uses xanax prn         Acute thoracic back pain - Primary     3 weeks of mid back bilateral achiness after pulling down logan from tree         Relevant Orders    XR spine thoracic 3 vw            Subjective: pt called because  she felt she may have had   bronchitis had covid test which was negative  Pt was sanding and think this bothered her felt bad for a few days but today feels ok  No cough no fever no chills no sob   Did have exercise stress test which ws negative and has follow up appt with cardiologist tomorrow  Pt had been  pulling logan down from trees 3 weeks ago and still with pain in mid back pt also feels a little "heaviness" in left leg around knee and ankle no swelling no pain     Patient ID: Kimi Coreas is a 72 y o  female  HPI    The following portions of the patient's history were reviewed and updated as appropriate:   Past Medical History:  She has a past medical history of Anxiety, Diverticulitis of colon, Fatty liver (6/17/2021), Glaucoma, H/O cardiovascular stress test (08/16/2021), Hypercholesteremia, Hypertension, and Osteopenia  ,  _______________________________________________________________________  Medical Problems:  does not have any pertinent problems on file ,  _______________________________________________________________________  Past Surgical History:   has a past surgical history that includes Tonsillectomy; Mammo (historical) (12/14/2018,11/10/17); Breast cyst excision (Right); Colonoscopy (2016);  Foot surgery (Right); and Tooth extraction  ,  _______________________________________________________________________  Family History:  family history includes Breast cancer in her paternal aunt; Early death (age of onset: 45) in her maternal grandfather; Heart disease in her maternal grandfather; Hyperlipidemia in her mother; Hypertension in her mother; Lung cancer in her father; No Known Problems in her brother, brother, maternal aunt, maternal grandmother, maternal uncle, paternal grandfather, paternal grandmother, and paternal uncle; Other in her mother; Stroke in her mother ,  _______________________________________________________________________  Social History:   reports that she has never smoked  She has never used smokeless tobacco  She reports previous alcohol use  She reports that she does not use drugs  ,  _______________________________________________________________________  Allergies:  is allergic to bee venom     _______________________________________________________________________  Current Outpatient Medications   Medication Sig Dispense Refill    ALPRAZolam (XANAX) 0 25 mg tablet Take 1 tablet (0 25 mg total) by mouth daily at bedtime as needed for anxiety 30 tablet 0    Ascorbic Acid (vitamin C) 1000 MG tablet Take 1,000 mg by mouth every morning      atorvastatin (LIPITOR) 10 mg tablet take 1 tablet by mouth once daily 90 tablet 2    B Complex-C (B-complex with vitamin C) tablet Take 1 tablet by mouth every morning      bisacodyl (DULCOLAX) 5 mg EC tablet Take 20 mg by mouth once      butalbital-acetaminophen-caffeine (FIORICET,ESGIC) -40 mg per tablet Take 1 tablet by mouth as needed for headaches      Calcium Carb-Cholecalciferol (CALTRATE 600+D3 PO) Take by mouth every morning      carvedilol (COREG) 12 5 mg tablet Take 1 tablet (12 5 mg total) by mouth 2 (two) times a day with meals 60 tablet 5    Cholecalciferol (Vitamin D) 50 MCG (2000 UT) CAPS Take by mouth every morning      Glucosamine-Chondroitin-MSM (GLUCOSAMINE CHONDROIT MSM DS PO) Take by mouth every morning 2 tabs      Lactobacillus-Inulin (CULTURELLE DIGESTIVE DAILY PO) Take by mouth every morning      losartan-hydrochlorothiazide (HYZAAR) 100-12 5 MG per tablet Take 1 tablet by mouth daily 90 tablet 1    magnesium citrate solution Take 296 mL by mouth once      multivitamin (THERAGRAN) TABS Take 1 tablet by mouth every morning      Polyethylene Glycol 3350 (MIRALAX PO) Take by mouth once 238 gm      timolol (BETIMOL) 0 5 % ophthalmic solution Administer 1 drop to both eyes every morning       TURMERIC CURCUMIN PO Take 1,500 mg by mouth every morning Last dose 8/19/21      escitalopram (LEXAPRO) 10 mg tablet Take 1 tablet (10 mg total) by mouth daily (Patient not taking: Reported on 9/16/2021) 30 tablet 5     No current facility-administered medications for this visit      _______________________________________________________________________  Review of Systems   Constitutional: Negative for chills and fever  HENT: Negative for congestion, ear pain, mouth sores, rhinorrhea, sinus pressure, sinus pain, sore throat and trouble swallowing  Eyes: Negative for discharge  Respiratory: Negative for cough, chest tightness and shortness of breath  Cardiovascular: Negative for chest pain, palpitations and leg swelling  Musculoskeletal: Positive for arthralgias (mid backheaviiness around knee and ankle nl pain FROM)  Negative for myalgias  Neurological: Negative for headaches  Psychiatric/Behavioral: The patient is nervous/anxious  Objective:  Vitals:    09/16/21 0946 09/16/21 1007   BP: 156/90 120/68   BP Location: Left arm    Patient Position: Sitting    Pulse: 68    Resp: 16    Temp: 98 9 °F (37 2 °C)    SpO2: 98%    Weight: 66 7 kg (147 lb)    Height: 5' 4 5" (1 638 m)      Body mass index is 24 84 kg/m²  Physical Exam  Constitutional:       General: She is not in acute distress  Appearance: Normal appearance   She is well-developed  She is not ill-appearing  HENT:      Right Ear: Tympanic membrane and ear canal normal       Left Ear: Tympanic membrane and ear canal normal       Nose: Nose normal       Right Sinus: No maxillary sinus tenderness or frontal sinus tenderness  Left Sinus: No maxillary sinus tenderness or frontal sinus tenderness  Mouth/Throat:      Mouth: Mucous membranes are moist       Pharynx: No oropharyngeal exudate or posterior oropharyngeal erythema  Tonsils: No tonsillar exudate  Eyes:      General:         Right eye: No discharge  Left eye: No discharge  Conjunctiva/sclera: Conjunctivae normal       Pupils: Pupils are equal, round, and reactive to light  Cardiovascular:      Rate and Rhythm: Normal rate  Pulses: Normal pulses  Heart sounds: Normal heart sounds  Pulmonary:      Effort: Pulmonary effort is normal  No respiratory distress  Breath sounds: Normal breath sounds  No wheezing, rhonchi or rales  Musculoskeletal:         General: No swelling, tenderness (mild tenderness thoracic paraspinal muscles ) or deformity  Normal range of motion  Cervical back: Normal range of motion  Right lower leg: No edema  Left lower leg: No edema  Lymphadenopathy:      Cervical: No cervical adenopathy  Neurological:      General: No focal deficit present  Mental Status: She is alert  Mental status is at baseline     Psychiatric:         Mood and Affect: Mood normal

## 2021-09-16 ENCOUNTER — OFFICE VISIT (OUTPATIENT)
Dept: FAMILY MEDICINE CLINIC | Facility: CLINIC | Age: 66
End: 2021-09-16

## 2021-09-16 ENCOUNTER — HOSPITAL ENCOUNTER (OUTPATIENT)
Dept: RADIOLOGY | Facility: HOSPITAL | Age: 66
Discharge: HOME | End: 2021-09-16
Payer: COMMERCIAL

## 2021-09-16 VITALS
HEIGHT: 65 IN | OXYGEN SATURATION: 98 % | BODY MASS INDEX: 24.49 KG/M2 | RESPIRATION RATE: 16 BRPM | HEART RATE: 68 BPM | DIASTOLIC BLOOD PRESSURE: 68 MMHG | SYSTOLIC BLOOD PRESSURE: 120 MMHG | TEMPERATURE: 98.9 F | WEIGHT: 147 LBS

## 2021-09-16 DIAGNOSIS — F41.1 GENERALIZED ANXIETY DISORDER: ICD-10-CM

## 2021-09-16 DIAGNOSIS — I10 ESSENTIAL HYPERTENSION: ICD-10-CM

## 2021-09-16 DIAGNOSIS — M54.6 ACUTE BILATERAL THORACIC BACK PAIN: ICD-10-CM

## 2021-09-16 DIAGNOSIS — M25.561 ACUTE PAIN OF RIGHT KNEE: ICD-10-CM

## 2021-09-16 DIAGNOSIS — M54.6 ACUTE BILATERAL THORACIC BACK PAIN: Primary | ICD-10-CM

## 2021-09-16 PROBLEM — M25.569 ACUTE KNEE PAIN: Status: ACTIVE | Noted: 2021-03-04

## 2021-09-16 PROCEDURE — 99214 OFFICE O/P EST MOD 30 MIN: CPT | Performed by: FAMILY MEDICINE

## 2021-09-16 PROCEDURE — 72072 X-RAY EXAM THORAC SPINE 3VWS: CPT

## 2021-09-16 RX ORDER — BUTALBITAL, ACETAMINOPHEN AND CAFFEINE 50; 325; 40 MG/1; MG/1; MG/1
1 TABLET ORAL AS NEEDED
COMMUNITY
End: 2022-02-28

## 2021-09-17 ENCOUNTER — OFFICE VISIT (OUTPATIENT)
Dept: CARDIOLOGY CLINIC | Facility: CLINIC | Age: 66
End: 2021-09-17
Payer: COMMERCIAL

## 2021-09-17 VITALS
DIASTOLIC BLOOD PRESSURE: 72 MMHG | WEIGHT: 147 LBS | HEART RATE: 80 BPM | TEMPERATURE: 99.9 F | SYSTOLIC BLOOD PRESSURE: 150 MMHG | OXYGEN SATURATION: 98 % | BODY MASS INDEX: 24.49 KG/M2 | HEIGHT: 65 IN

## 2021-09-17 DIAGNOSIS — R60.0 BILATERAL LEG EDEMA: Primary | ICD-10-CM

## 2021-09-17 DIAGNOSIS — I10 ESSENTIAL HYPERTENSION: ICD-10-CM

## 2021-09-17 DIAGNOSIS — I10 HTN (HYPERTENSION), MALIGNANT: ICD-10-CM

## 2021-09-17 PROCEDURE — 99214 OFFICE O/P EST MOD 30 MIN: CPT | Performed by: INTERNAL MEDICINE

## 2021-09-17 RX ORDER — LOSARTAN POTASSIUM AND HYDROCHLOROTHIAZIDE 12.5; 1 MG/1; MG/1
1 TABLET ORAL EVERY MORNING
Qty: 90 TABLET | Refills: 1 | Status: SHIPPED | OUTPATIENT
Start: 2021-09-17 | End: 2022-05-20

## 2021-09-17 NOTE — PROGRESS NOTES
[facility]  [address]  [address]   [place], [address]  Cardiology Follow-up    Fronie Homans  [id number]  1955      1  Bilateral leg edema  Compression Stocking   2  Essential hypertension  losartan-hydrochlorothiazide (HYZAAR) 100-12 5 MG per tablet   3  HTN (hypertension), malignant  Compression Stocking      Discussion/Summary:   Exertional shortness of breath/malignant hypertension- stress test negative  Structured exercise  Continue losartan-hctz + carvedilol  Hyperlipidemia/elevated LFTs- currently on atorvastatin 10 mg  Monitoring of her LFTs  Her last CT was negative for evidence for non alcoholic steatohepatitis  Recheck blood work    Family history for CVA- previous EKGs negative for atrial fibrillation  Previous Holter monitor was also negative  Varicose veins/swelling- compression sock    HPI:   55-year-old woman with family history for CVA, hypertension, hyperlipidemia presents with recent ER visit secondary to dizziness found to have hypertensive urgency  She states her blood pressures have been better since her medication regimen has been changed  She is trying to watch her sodium intake  She has previously had some exertional shortness of breath  She has also had some chest discomfort which she believes was a pulled muscle  She has been compliant with her medications  She denies having lower extremity swelling  She denies having fevers or chills  09/17/2021: We reviewed through her exercise stress test which showed no evidence of exercise-induced ischemia  Her blood pressure was controlled with exertion  She reports having some leg tightness  No overt edema  Plan to try compression  She has been compliant with medications        PMH  hld- atorvastatin  Htn- bp meds    Social Hx    Retired  [employer]  Not formal exercise  No smoking  Cut out beer  Sleep- no snoring, no daytime fatigue    Family Hx  Grandfather- MI  Mother- stroke/htn      Past Medical History:   Diagnosis Date    Anxiety     Diverticulitis of colon     Fatty liver 2021    Glaucoma     H/O cardiovascular stress test 2021    Dr Yesenia Abraham Hypertension     Osteopenia      Social History     Socioeconomic History    Marital status: /Civil Union     Spouse name: Not on file    Number of children: Not on file    Years of education: Not on file    Highest education level: Not on file   Occupational History    Not on file   Tobacco Use    Smoking status: Never Smoker    Smokeless tobacco: Never Used   Vaping Use    Vaping Use: Never used   Substance and Sexual Activity    Alcohol use: Not Currently    Drug use: Never    Sexual activity: Not on file   Other Topics Concern    Not on file   Social History Narrative    Tobacco smoking status:   Never smoker        Most recent tobacco use screenin2018          Alcohol intake: Moderate    Per jackie     Social Determinants of Health     Financial Resource Strain:     Difficulty of Paying Living Expenses:    Food Insecurity:     Worried About Running Out of Food in the Last Year:     [address] in the Last Year:    Transportation Needs:     Lack of Transportation (Medical):      Lack of Transportation (Non-Medical):    Physical Activity:     Days of Exercise per Week:     Minutes of Exercise per Session:    Stress:     Feeling of Stress :    Social Connections:     Frequency of Communication with Friends and Family:     Frequency of Social Gatherings with Friends and Family:     Attends Hindu Services:     Active Member of Clubs or Organizations:     Attends Club or Organization Meetings:     Marital Status:    Intimate Partner Violence:     Fear of Current or Ex-Partner:     Emotionally Abused:     Physically Abused:     Sexually Abused:       Family History   Problem Relation Age of Onset    Hyperlipidemia Mother    Isai Hypertension Mother    Gurdeep Rae Other Mother         cerebrovascular accident   Gurdeep Rae Stroke Mother    Gurdeep Rae Lung cancer Father     No Known Problems Brother     No Known Problems Maternal Aunt     No Known Problems Maternal Uncle     Breast cancer Paternal Aunt     No Known Problems Paternal Uncle     No Known Problems Maternal Grandmother     Heart disease Maternal Grandfather     Early death Maternal Grandfather 45        massive MI-congenital defect?  No Known Problems Paternal Grandmother     No Known Problems Paternal Grandfather     No Known Problems Brother     ADD / ADHD Neg Hx     Anesthesia problems Neg Hx     Cancer Neg Hx     Clotting disorder Neg Hx     Collagen disease Neg Hx     Diabetes Neg Hx     Dislocations Neg Hx     Learning disabilities Neg Hx     Neurological problems Neg Hx     Osteoporosis Neg Hx     Rheumatologic disease Neg Hx     Scoliosis Neg Hx     Vascular Disease Neg Hx      Past Surgical History:   Procedure Laterality Date    BREAST CYST EXCISION Right     benign - 2000 approx      COLONOSCOPY  2016    FOOT SURGERY Right     removal of cat tooth removed    MAMMO (HISTORICAL)  12/14/2018,11/10/17    TONSILLECTOMY      TOOTH EXTRACTION         Current Outpatient Medications:     ALPRAZolam (XANAX) 0 25 mg tablet, Take 1 tablet (0 25 mg total) by mouth daily at bedtime as needed for anxiety, Disp: 30 tablet, Rfl: 0    Ascorbic Acid (vitamin C) 1000 MG tablet, Take 1,000 mg by mouth every morning, Disp: , Rfl:     atorvastatin (LIPITOR) 10 mg tablet, take 1 tablet by mouth once daily, Disp: 90 tablet, Rfl: 2    B Complex-C (B-complex with vitamin C) tablet, Take 1 tablet by mouth every morning, Disp: , Rfl:     butalbital-acetaminophen-caffeine (FIORICET,ESGIC) -40 mg per tablet, Take 1 tablet by mouth as needed for headaches, Disp: , Rfl:     carvedilol (COREG) 12 5 mg tablet, Take 1 tablet (12 5 mg total) by mouth 2 (two) times a day with meals, Disp: 60 tablet, Rfl: 5    Cholecalciferol (Vitamin D) 50 MCG (2000 UT) CAPS, Take by mouth every morning, Disp: , Rfl:     Glucosamine-Chondroitin-MSM (GLUCOSAMINE CHONDROIT MSM DS PO), Take by mouth every morning 2 tabs, Disp: , Rfl:     Lactobacillus-Inulin (CULTURELLE DIGESTIVE DAILY PO), Take by mouth every morning, Disp: , Rfl:     losartan-hydrochlorothiazide (HYZAAR) 100-12 5 MG per tablet, Take 1 tablet by mouth every morning, Disp: 90 tablet, Rfl: 1    magnesium citrate solution, Take 296 mL by mouth once, Disp: , Rfl:     multivitamin (THERAGRAN) TABS, Take 1 tablet by mouth every morning, Disp: , Rfl:     Polyethylene Glycol 3350 (MIRALAX PO), Take by mouth once 238 gm, Disp: , Rfl:     timolol (BETIMOL) 0 5 % ophthalmic solution, Administer 1 drop to both eyes every morning , Disp: , Rfl:     TURMERIC CURCUMIN PO, Take 1,500 mg by mouth every morning Last dose 8/19/21, Disp: , Rfl:     bisacodyl (DULCOLAX) 5 mg EC tablet, Take 20 mg by mouth once (Patient not taking: Reported on 9/17/2021), Disp: , Rfl:     Calcium Carb-Cholecalciferol (CALTRATE 600+D3 PO), Take by mouth every morning (Patient not taking: Reported on 9/17/2021), Disp: , Rfl:     escitalopram (LEXAPRO) 10 mg tablet, Take 1 tablet (10 mg total) by mouth daily (Patient not taking: Reported on 9/16/2021), Disp: 30 tablet, Rfl: 5  Allergies   Allergen Reactions    Bee Venom Swelling     Vitals:    09/17/21 1332   BP: 150/72   BP Location: Right arm   Patient Position: Sitting   Cuff Size: Standard   Pulse: 80   Temp: 99 9 °F (37 7 °C)   SpO2: 98%   Weight: 66 7 kg (147 lb)   Height: 5' 4 5" (1 638 m)       Review of Systems:   Review of Systems   Constitutional: Negative  Negative for activity change, appetite change, chills, diaphoresis, fatigue, fever and unexpected weight change  HENT: Negative    Negative for congestion, dental problem, drooling, ear discharge, ear pain, facial swelling, hearing loss, mouth sores, nosebleeds, postnasal drip, rhinorrhea, sinus pressure, sinus pain, sneezing, sore throat, tinnitus, trouble swallowing and voice change  Eyes: Negative  Negative for photophobia, pain, redness, itching and visual disturbance  Respiratory: Positive for shortness of breath  Negative for apnea, cough, choking, chest tightness, wheezing and stridor  Cardiovascular: Negative for chest pain, palpitations and leg swelling  Gastrointestinal: Negative  Negative for abdominal distention, abdominal pain, anal bleeding, blood in stool, constipation, diarrhea, nausea, rectal pain and vomiting  Endocrine: Negative  Negative for cold intolerance, heat intolerance, polydipsia, polyphagia and polyuria  Genitourinary: Negative  Negative for decreased urine volume, difficulty urinating, dyspareunia, dysuria, enuresis, flank pain, frequency, genital sores, hematuria, menstrual problem, pelvic pain, urgency, vaginal bleeding, vaginal discharge and vaginal pain  Musculoskeletal: Negative  Negative for arthralgias, back pain, gait problem, joint swelling, myalgias, neck pain and neck stiffness  Skin: Negative  Negative for color change, pallor, rash and wound  Allergic/Immunologic: Negative  Negative for environmental allergies, food allergies and immunocompromised state  Neurological: Positive for dizziness  Negative for tremors, seizures, syncope, facial asymmetry, speech difficulty, weakness, light-headedness, numbness and headaches  Hematological: Negative  Negative for adenopathy  Does not bruise/bleed easily  Psychiatric/Behavioral: Negative  Negative for agitation, behavioral problems, confusion, decreased concentration, dysphoric mood, hallucinations, self-injury, sleep disturbance and suicidal ideas  The patient is not nervous/anxious and is not hyperactive  All other systems reviewed and are negative        Vitals:    09/17/21 1332   BP: 150/72   BP Location: Right arm   Patient Position: Sitting   Cuff Size: Standard   Pulse: 80   Temp: 99 9 °F (37 7 °C)   SpO2: 98%   Weight: 66 7 kg (147 lb)   Height: 5' 4 5" (1 638 m)     Physical Examination:   Physical Exam  Constitutional:       General: She is not in acute distress  Appearance: She is well-developed  She is not diaphoretic  HENT:      Head: Normocephalic and atraumatic  Right Ear: External ear normal       Left Ear: External ear normal    Eyes:      General: No scleral icterus  Right eye: No discharge  Left eye: No discharge  Conjunctiva/sclera: Conjunctivae normal       Pupils: Pupils are equal, round, and reactive to light  Neck:      Thyroid: No thyromegaly  Vascular: No JVD  Trachea: No tracheal deviation  Cardiovascular:      Rate and Rhythm: Normal rate and regular rhythm  Heart sounds: No murmur heard  No friction rub  Gallop present  Pulmonary:      Effort: Pulmonary effort is normal  No respiratory distress  Breath sounds: Normal breath sounds  No stridor  No wheezing or rales  Chest:      Chest wall: No tenderness  Abdominal:      General: Bowel sounds are normal  There is no distension  Palpations: Abdomen is soft  There is no mass  Tenderness: There is no abdominal tenderness  There is no guarding or rebound  Musculoskeletal:         General: No tenderness or deformity  Normal range of motion  Cervical back: Normal range of motion and neck supple  Skin:     General: Skin is warm and dry  Coloration: Skin is not pale  Findings: No erythema or rash  Neurological:      Mental Status: She is alert and oriented to person, place, and time  Cranial Nerves: No cranial nerve deficit  Motor: No abnormal muscle tone  Coordination: Coordination normal       Deep Tendon Reflexes: Reflexes are normal and symmetric  Reflexes normal    Psychiatric:         Behavior: Behavior normal          Thought Content:  Thought content normal          Judgment: Judgment normal          Labs:     Lab Results   Component Value Date    WBC 6 02 07/22/2021    HGB 13 1 07/22/2021    HCT 40 4 07/22/2021    MCV 91 07/22/2021    RDW 12 6 07/22/2021     07/22/2021     BMP:  Lab Results   Component Value Date    SODIUM 140 07/29/2021    K 3 8 07/29/2021     07/29/2021    CO2 31 07/29/2021    BUN 19 07/29/2021    CREATININE 0 81 07/29/2021    GLUC 110 07/29/2021    CALCIUM 9 1 07/29/2021    EGFR 76 07/29/2021    MG 1 9 07/22/2021     LFT:  Lab Results   Component Value Date    AST 56 (H) 07/22/2021    ALT 99 (H) 07/22/2021    ALKPHOS 48 07/22/2021    TP 7 5 07/22/2021    ALB 4 0 07/22/2021      No results found for: [facility]  Lab Results   Component Value Date    HGBA1C 5 1 10/30/2020     Lipid Profile:   Lab Results   Component Value Date    CHOLESTEROL 159 10/30/2020    HDL 72 10/30/2020    LDLCALC 73 10/30/2020    TRIG 48 10/30/2020     Lab Results   Component Value Date    CHOLESTEROL 159 10/30/2020    CHOLESTEROL 226 (H) 08/24/2020     Lab Results   Component Value Date    TROPONINI <0 02 07/22/2021     Lab Results   Component Value Date    NTBNP 501 (H) 07/22/2021      Recent Results (from the past 672 hour(s))   Tissue Exam    Collection Time: 08/25/21 12:15 PM   Result Value Ref Range    Case Report       Surgical Pathology Report                         Case: [id number]                                   Authorizing Provider:  Sharron Tiwari MD      Collected:           08/25/2021 1215              Ordering Location:     [facility]   Received:            08/25/2021 [address] [place] [address]                                                                       Pathologist:           Magaly Pavon MD                                                                  Specimen:    Large Intestine, Sigmoid Colon, mid sigmoid polyp cold biopsy                              Final Diagnosis       A   Colon, Mid sigmoid polyp, Polypectomy:  - Benign polypoid colonic mucosa with reactive lymphoid aggregate   - No epithelial dysplasia and no evidence of malignancy  - Multiple step sections are examined  Additional Information       All reported additional testing was performed with appropriately reactive controls  These tests were developed and their performance characteristics determined by [facility] or appropriate performing facility, though some tests may be performed on tissues which have not been validated for performance characteristics (such as staining performed on alcohol exposed cell blocks and decalcified tissues)  Results should be interpreted with caution and in the context of the patients clinical condition  These tests may not be cleared or approved by the [place]  Food and Drug Administration, though the FDA has determined that such clearance or approval is not necessary  These tests are used for clinical purposes and they should not be regarded as investigational or for research  This laboratory has been approved by [place], designated as a high-complexity laboratory and is qualified to perform these tests  Interpretation performed at [place] [address]      Gross Description          A  The specimen is received in formalin, labeled with the patient's name and hospital number, and is designated "mid sigmoid polyp, cold biopsy  The specimen consists of 1 tan-white rubbery soft tissue fragment measuring 0 5 cm in greatest dimension  Entirely submitted  Screened cassette  Note: The estimated total formalin fixation time based upon information provided by the submitting clinician and the standard processing schedule is under 72 hours    Colleen          Novel Coronavirus (Covid-19),PCR [facility] - Collected at [facility] or [facility]    Collection Time: 09/14/21  1:47 PM    Specimen: Nose; Nares   Result Value Ref Range    SARS-CoV-2 Negative Negative       Imaging & Testing   I have personally reviewed pertinent reports  Cardiac Testing     EKG: Personally reviewed  Reviewed previous EKG normal sinus rhythm no acute ST changes      Ely Hamman MD [facility]  [phone]  Please call with any questions or suggestions    Counseling :  A description of the counseling:   Goals and Barriers:  Patient's ability to self care:  Medication side effect reviewed with patient in detail and all their questions answered  "Portions of the record may have been created with voice recognition software  Occasional wrong word or "sound a like" substitutions may have occurred due to the inherent limitations of voice recognition software  Read the chart carefully and recognize, using context, where substitutions have occurred   Please call if you have any questions  "

## 2021-10-06 ENCOUNTER — TELEPHONE (OUTPATIENT)
Dept: FAMILY MEDICINE CLINIC | Facility: CLINIC | Age: 66
End: 2021-10-06

## 2021-10-11 ENCOUNTER — OFFICE VISIT (OUTPATIENT)
Dept: FAMILY MEDICINE CLINIC | Facility: CLINIC | Age: 66
End: 2021-10-11
Payer: COMMERCIAL

## 2021-10-11 VITALS
SYSTOLIC BLOOD PRESSURE: 136 MMHG | OXYGEN SATURATION: 98 % | TEMPERATURE: 98.1 F | HEIGHT: 65 IN | WEIGHT: 147 LBS | RESPIRATION RATE: 16 BRPM | DIASTOLIC BLOOD PRESSURE: 80 MMHG | BODY MASS INDEX: 24.49 KG/M2 | HEART RATE: 72 BPM

## 2021-10-11 DIAGNOSIS — F41.1 GENERALIZED ANXIETY DISORDER: ICD-10-CM

## 2021-10-11 DIAGNOSIS — M54.6 ACUTE BILATERAL THORACIC BACK PAIN: ICD-10-CM

## 2021-10-11 DIAGNOSIS — M79.659 MUSCULOSKELETAL THIGH PAIN, UNSPECIFIED LATERALITY: Primary | ICD-10-CM

## 2021-10-11 PROCEDURE — 99213 OFFICE O/P EST LOW 20 MIN: CPT | Performed by: FAMILY MEDICINE

## 2021-10-11 RX ORDER — TIMOLOL MALEATE 5 MG/ML
SOLUTION/ DROPS OPHTHALMIC
COMMUNITY
Start: 2021-09-14

## 2021-10-15 ENCOUNTER — VBI (OUTPATIENT)
Dept: ADMINISTRATIVE | Facility: OTHER | Age: 66
End: 2021-10-15

## 2021-10-20 ENCOUNTER — EVALUATION (OUTPATIENT)
Dept: PHYSICAL THERAPY | Facility: CLINIC | Age: 66
End: 2021-10-20
Payer: COMMERCIAL

## 2021-10-20 DIAGNOSIS — M54.6 ACUTE BILATERAL THORACIC BACK PAIN: ICD-10-CM

## 2021-10-20 DIAGNOSIS — M79.659 MUSCULOSKELETAL THIGH PAIN, UNSPECIFIED LATERALITY: ICD-10-CM

## 2021-10-20 PROCEDURE — 97161 PT EVAL LOW COMPLEX 20 MIN: CPT

## 2021-10-26 ENCOUNTER — OFFICE VISIT (OUTPATIENT)
Dept: PHYSICAL THERAPY | Facility: CLINIC | Age: 66
End: 2021-10-26
Payer: COMMERCIAL

## 2021-10-26 DIAGNOSIS — M79.659 MUSCULOSKELETAL THIGH PAIN, UNSPECIFIED LATERALITY: Primary | ICD-10-CM

## 2021-10-26 DIAGNOSIS — M54.6 ACUTE BILATERAL THORACIC BACK PAIN: ICD-10-CM

## 2021-10-26 PROCEDURE — 97112 NEUROMUSCULAR REEDUCATION: CPT

## 2021-10-26 PROCEDURE — 97110 THERAPEUTIC EXERCISES: CPT

## 2021-10-28 ENCOUNTER — OFFICE VISIT (OUTPATIENT)
Dept: PHYSICAL THERAPY | Facility: CLINIC | Age: 66
End: 2021-10-28
Payer: COMMERCIAL

## 2021-10-28 DIAGNOSIS — M54.6 ACUTE BILATERAL THORACIC BACK PAIN: ICD-10-CM

## 2021-10-28 DIAGNOSIS — M79.659 MUSCULOSKELETAL THIGH PAIN, UNSPECIFIED LATERALITY: Primary | ICD-10-CM

## 2021-10-28 PROCEDURE — 97110 THERAPEUTIC EXERCISES: CPT | Performed by: PHYSICAL THERAPIST

## 2021-10-28 PROCEDURE — 97140 MANUAL THERAPY 1/> REGIONS: CPT | Performed by: PHYSICAL THERAPIST

## 2021-11-02 ENCOUNTER — OFFICE VISIT (OUTPATIENT)
Dept: PHYSICAL THERAPY | Facility: CLINIC | Age: 66
End: 2021-11-02
Payer: COMMERCIAL

## 2021-11-02 DIAGNOSIS — M54.6 ACUTE BILATERAL THORACIC BACK PAIN: ICD-10-CM

## 2021-11-02 DIAGNOSIS — M79.659 MUSCULOSKELETAL THIGH PAIN, UNSPECIFIED LATERALITY: Primary | ICD-10-CM

## 2021-11-02 PROCEDURE — 97112 NEUROMUSCULAR REEDUCATION: CPT | Performed by: PHYSICAL THERAPIST

## 2021-11-02 PROCEDURE — 97110 THERAPEUTIC EXERCISES: CPT | Performed by: PHYSICAL THERAPIST

## 2021-11-04 ENCOUNTER — OFFICE VISIT (OUTPATIENT)
Dept: PHYSICAL THERAPY | Facility: CLINIC | Age: 66
End: 2021-11-04
Payer: COMMERCIAL

## 2021-11-04 DIAGNOSIS — M54.6 ACUTE BILATERAL THORACIC BACK PAIN: ICD-10-CM

## 2021-11-04 DIAGNOSIS — M79.659 MUSCULOSKELETAL THIGH PAIN, UNSPECIFIED LATERALITY: Primary | ICD-10-CM

## 2021-11-04 PROCEDURE — 97110 THERAPEUTIC EXERCISES: CPT

## 2021-11-04 PROCEDURE — 97112 NEUROMUSCULAR REEDUCATION: CPT

## 2021-11-09 ENCOUNTER — OFFICE VISIT (OUTPATIENT)
Dept: PHYSICAL THERAPY | Facility: CLINIC | Age: 66
End: 2021-11-09
Payer: COMMERCIAL

## 2021-11-09 DIAGNOSIS — M54.6 ACUTE BILATERAL THORACIC BACK PAIN: ICD-10-CM

## 2021-11-09 DIAGNOSIS — M79.659 MUSCULOSKELETAL THIGH PAIN, UNSPECIFIED LATERALITY: Primary | ICD-10-CM

## 2021-11-09 PROCEDURE — 97140 MANUAL THERAPY 1/> REGIONS: CPT | Performed by: PHYSICAL THERAPIST

## 2021-11-09 PROCEDURE — 97110 THERAPEUTIC EXERCISES: CPT | Performed by: PHYSICAL THERAPIST

## 2021-11-12 ENCOUNTER — OFFICE VISIT (OUTPATIENT)
Dept: PHYSICAL THERAPY | Facility: CLINIC | Age: 66
End: 2021-11-12
Payer: COMMERCIAL

## 2021-11-12 DIAGNOSIS — M54.6 ACUTE BILATERAL THORACIC BACK PAIN: ICD-10-CM

## 2021-11-12 DIAGNOSIS — M79.659 MUSCULOSKELETAL THIGH PAIN, UNSPECIFIED LATERALITY: Primary | ICD-10-CM

## 2021-11-12 PROCEDURE — 97110 THERAPEUTIC EXERCISES: CPT

## 2021-11-12 PROCEDURE — 97112 NEUROMUSCULAR REEDUCATION: CPT

## 2021-11-16 ENCOUNTER — OFFICE VISIT (OUTPATIENT)
Dept: PHYSICAL THERAPY | Facility: CLINIC | Age: 66
End: 2021-11-16
Payer: COMMERCIAL

## 2021-11-16 DIAGNOSIS — M54.6 ACUTE BILATERAL THORACIC BACK PAIN: ICD-10-CM

## 2021-11-16 DIAGNOSIS — M79.659 MUSCULOSKELETAL THIGH PAIN, UNSPECIFIED LATERALITY: Primary | ICD-10-CM

## 2021-11-16 PROCEDURE — 97112 NEUROMUSCULAR REEDUCATION: CPT

## 2021-11-16 PROCEDURE — 97110 THERAPEUTIC EXERCISES: CPT

## 2021-11-19 ENCOUNTER — APPOINTMENT (OUTPATIENT)
Dept: PHYSICAL THERAPY | Facility: CLINIC | Age: 66
End: 2021-11-19
Payer: COMMERCIAL

## 2021-11-23 ENCOUNTER — OFFICE VISIT (OUTPATIENT)
Dept: PHYSICAL THERAPY | Facility: CLINIC | Age: 66
End: 2021-11-23
Payer: COMMERCIAL

## 2021-11-23 DIAGNOSIS — M79.659 MUSCULOSKELETAL THIGH PAIN, UNSPECIFIED LATERALITY: Primary | ICD-10-CM

## 2021-11-23 DIAGNOSIS — M54.6 ACUTE BILATERAL THORACIC BACK PAIN: ICD-10-CM

## 2021-11-23 PROCEDURE — 97112 NEUROMUSCULAR REEDUCATION: CPT

## 2021-11-23 PROCEDURE — 97110 THERAPEUTIC EXERCISES: CPT

## 2022-01-19 DIAGNOSIS — I10 ESSENTIAL HYPERTENSION: ICD-10-CM

## 2022-01-19 RX ORDER — CARVEDILOL 12.5 MG/1
TABLET ORAL
Qty: 60 TABLET | Refills: 5 | Status: SHIPPED | OUTPATIENT
Start: 2022-01-19 | End: 2022-03-21 | Stop reason: SDUPTHER

## 2022-02-17 DIAGNOSIS — F41.1 ANXIETY STATE: ICD-10-CM

## 2022-02-17 RX ORDER — ALPRAZOLAM 0.25 MG/1
0.25 TABLET ORAL
Qty: 30 TABLET | Refills: 0 | Status: SHIPPED | OUTPATIENT
Start: 2022-02-17

## 2022-02-28 ENCOUNTER — OFFICE VISIT (OUTPATIENT)
Dept: FAMILY MEDICINE CLINIC | Facility: CLINIC | Age: 67
End: 2022-02-28
Payer: COMMERCIAL

## 2022-02-28 VITALS
HEIGHT: 65 IN | SYSTOLIC BLOOD PRESSURE: 136 MMHG | RESPIRATION RATE: 16 BRPM | DIASTOLIC BLOOD PRESSURE: 70 MMHG | TEMPERATURE: 97.7 F | WEIGHT: 149 LBS | OXYGEN SATURATION: 98 % | BODY MASS INDEX: 24.83 KG/M2 | HEART RATE: 80 BPM

## 2022-02-28 DIAGNOSIS — K57.90 DIVERTICULOSIS: ICD-10-CM

## 2022-02-28 DIAGNOSIS — F41.1 GENERALIZED ANXIETY DISORDER: Primary | ICD-10-CM

## 2022-02-28 DIAGNOSIS — I10 ESSENTIAL HYPERTENSION: ICD-10-CM

## 2022-02-28 DIAGNOSIS — M85.89 OSTEOPENIA OF MULTIPLE SITES: ICD-10-CM

## 2022-02-28 DIAGNOSIS — Z12.31 BREAST CANCER SCREENING BY MAMMOGRAM: ICD-10-CM

## 2022-02-28 DIAGNOSIS — G43.009 MIGRAINE WITHOUT AURA AND WITHOUT STATUS MIGRAINOSUS, NOT INTRACTABLE: ICD-10-CM

## 2022-02-28 DIAGNOSIS — E78.5 DYSLIPIDEMIA: ICD-10-CM

## 2022-02-28 DIAGNOSIS — Z78.0 POSTMENOPAUSAL: ICD-10-CM

## 2022-02-28 DIAGNOSIS — K76.0 FATTY LIVER: ICD-10-CM

## 2022-02-28 PROBLEM — K59.04 CHRONIC IDIOPATHIC CONSTIPATION: Status: RESOLVED | Noted: 2021-05-25 | Resolved: 2022-02-28

## 2022-02-28 PROBLEM — R94.31 EKG ABNORMALITIES: Status: RESOLVED | Noted: 2021-06-17 | Resolved: 2022-02-28

## 2022-02-28 PROBLEM — R14.0 BLOATING: Status: RESOLVED | Noted: 2021-05-25 | Resolved: 2022-02-28

## 2022-02-28 PROBLEM — M54.6 ACUTE THORACIC BACK PAIN: Status: RESOLVED | Noted: 2021-09-16 | Resolved: 2022-02-28

## 2022-02-28 PROBLEM — L23.7 POISON IVY: Status: RESOLVED | Noted: 2021-07-08 | Resolved: 2022-02-28

## 2022-02-28 PROBLEM — R94.31 ABNORMAL EKG: Status: RESOLVED | Noted: 2021-03-04 | Resolved: 2022-02-28

## 2022-02-28 PROCEDURE — 99214 OFFICE O/P EST MOD 30 MIN: CPT | Performed by: FAMILY MEDICINE

## 2022-02-28 PROCEDURE — G0438 PPPS, INITIAL VISIT: HCPCS | Performed by: FAMILY MEDICINE

## 2022-02-28 NOTE — PATIENT INSTRUCTIONS
Medicare Preventive Visit Patient Instructions  Thank you for completing your Welcome to Medicare Visit or Medicare Annual Wellness Visit today  Your next wellness visit will be due in one year (3/1/2023)  The screening/preventive services that you may require over the next 5-10 years are detailed below  Some tests may not apply to you based off risk factors and/or age  Screening tests ordered at today's visit but not completed yet may show as past due  Also, please note that scanned in results may not display below  Preventive Screenings:  Service Recommendations Previous Testing/Comments   Colorectal Cancer Screening  * Colonoscopy    * Fecal Occult Blood Test (FOBT)/Fecal Immunochemical Test (FIT)  * Fecal DNA/Cologuard Test  * Flexible Sigmoidoscopy Age: 54-65 years old   Colonoscopy: every 10 years (may be performed more frequently if at higher risk)  OR  FOBT/FIT: every 1 year  OR  Cologuard: every 3 years  OR  Sigmoidoscopy: every 5 years  Screening may be recommended earlier than age 48 if at higher risk for colorectal cancer  Also, an individualized decision between you and your healthcare provider will decide whether screening between the ages of 74-80 would be appropriate  Colonoscopy: 08/25/2021  FOBT/FIT: Not on file  Cologuard: Not on file  Sigmoidoscopy: Not on file    Screening Current     Breast Cancer Screening Age: 36 years old  Frequency: every 1-2 years  Not required if history of left and right mastectomy Mammogram: 01/22/2021    Screening Current  Risks and Benefits Discussed  Due for Mammogram   Cervical Cancer Screening Between the ages of 21-29, pap smear recommended once every 3 years  Between the ages of 33-67, can perform pap smear with HPV co-testing every 5 years     Recommendations may differ for women with a history of total hysterectomy, cervical cancer, or abnormal pap smears in past  Pap Smear: Not on file    Screening Not Indicated   Hepatitis C Screening Once for adults born between [birth year] and 1965  More frequently in patients at high risk for Hepatitis C Hep C Antibody: 06/15/2020    Screening Current   Diabetes Screening 1-2 times per year if you're at risk for diabetes or have pre-diabetes Fasting glucose: No results in last 5 years   A1C: 5 1 % of total Hgb    Screening Current   Cholesterol Screening Once every 5 years if you don't have a lipid disorder  May order more often based on risk factors  Lipid panel: 10/30/2020    Screening Not Indicated  History Lipid Disorder     Other Preventive Screenings Covered by Medicare:  1  Abdominal Aortic Aneurysm (AAA) Screening: covered once if your at risk  You're considered to be at risk if you have a family history of AAA  2  Lung Cancer Screening: covers low dose CT scan once per year if you meet all of the following conditions: (1) Age 50-69; (2) No signs or symptoms of lung cancer; (3) Current smoker or have quit smoking within the last 15 years; (4) You have a tobacco smoking history of at least 30 pack years (packs per day multiplied by number of years you smoked); (5) You get a written order from a healthcare provider  3  Glaucoma Screening: covered annually if you're considered high risk: (1) You have diabetes OR (2) Family history of glaucoma OR (3)  aged 48 and older OR (3)  American aged 72 and older  3  Osteoporosis Screening: covered every 2 years if you meet one of the following conditions: (1) You're estrogen deficient and at risk for osteoporosis based off medical history and other findings; (2) Have a vertebral abnormality; (3) On glucocorticoid therapy for more than 3 months; (4) Have primary hyperparathyroidism; (5) On osteoporosis medications and need to assess response to drug therapy  · Last bone density test (DXA Scan): 05/30/2018  5  HIV Screening: covered annually if you're between the age of 12-76   Also covered annually if you are younger than 13 and older than 72 with risk factors for HIV infection  For pregnant patients, it is covered up to 3 times per pregnancy  Immunizations:  Immunization Recommendations   Influenza Vaccine Annual influenza vaccination during flu season is recommended for all persons aged >= 6 months who do not have contraindications   Pneumococcal Vaccine (Prevnar and Pneumovax)  * Prevnar = PCV13  * Pneumovax = PPSV23   Adults 25-60 years old: 1-3 doses may be recommended based on certain risk factors  Adults 72 years old: Prevnar (PCV13) vaccine recommended followed by Pneumovax (PPSV23) vaccine  If already received PPSV23 since turning 65, then PCV13 recommended at least one year after PPSV23 dose  Hepatitis B Vaccine 3 dose series if at intermediate or high risk (ex: diabetes, end stage renal disease, liver disease)   Tetanus (Td) Vaccine - COST NOT COVERED BY MEDICARE PART B Following completion of primary series, a booster dose should be given every 10 years to maintain immunity against tetanus  Td may also be given as tetanus wound prophylaxis  Tdap Vaccine - COST NOT COVERED BY MEDICARE PART B Recommended at least once for all adults  For pregnant patients, recommended with each pregnancy  Shingles Vaccine (Shingrix) - COST NOT COVERED BY MEDICARE PART B  2 shot series recommended in those aged 48 and above     Health Maintenance Due:      Topic Date Due    Breast Cancer Screening: Mammogram  01/22/2022    Colorectal Cancer Screening  08/23/2028    Hepatitis C Screening  Completed     Immunizations Due:      Topic Date Due    COVID-19 Vaccine (1) Never done     Advance Directives   What are advance directives? Advance directives are legal documents that state your wishes and plans for medical care  These plans are made ahead of time in case you lose your ability to make decisions for yourself  Advance directives can apply to any medical decision, such as the treatments you want, and if you want to donate organs     What are the types of advance directives? There are many types of advance directives, and each state has rules about how to use them  You may choose a combination of any of the following:  · Living will: This is a written record of the treatment you want  You can also choose which treatments you do not want, which to limit, and which to stop at a certain time  This includes surgery, medicine, IV fluid, and tube feedings  · Durable power of  for healthcare [facility]): This is a written record that states who you want to make healthcare choices for you when you are unable to make them for yourself  This person, called a proxy, is usually a family member or a friend  You may choose more than 1 proxy  · Do not resuscitate (DNR) order:  A DNR order is used in case your heart stops beating or you stop breathing  It is a request not to have certain forms of treatment, such as CPR  A DNR order may be included in other types of advance directives  · Medical directive: This covers the care that you want if you are in a coma, near death, or unable to make decisions for yourself  You can list the treatments you want for each condition  Treatment may include pain medicine, surgery, blood transfusions, dialysis, IV or tube feedings, and a ventilator (breathing machine)  · Values history: This document has questions about your views, beliefs, and how you feel and think about life  This information can help others choose the care that you would choose  Why are advance directives important? An advance directive helps you control your care  Although spoken wishes may be used, it is better to have your wishes written down  Spoken wishes can be misunderstood, or not followed  Treatments may be given even if you do not want them  An advance directive may make it easier for your family to make difficult choices about your care  Fall Prevention    Fall prevention  includes ways to make your home and other areas safer   It also includes ways you can move more carefully to prevent a fall  Health conditions that cause changes in your blood pressure, vision, or muscle strength and coordination may increase your risk for falls  Medicines may also increase your risk for falls if they make you dizzy, weak, or sleepy  Fall prevention tips:   · Stand or sit up slowly  · Use assistive devices as directed  · Wear shoes that fit well and have soles that   · Wear a personal alarm  · Stay active  · Manage your medical conditions  Home Safety Tips:  · Add items to prevent falls in the bathroom  · Keep paths clear  · Install bright lights in your home  · Keep items you use often on shelves within reach  · Paint or place reflective tape on the edges of your stairs  Weight Management   Why it is important to manage your weight:  Being overweight increases your risk of health conditions such as heart disease, high blood pressure, type 2 diabetes, and certain types of cancer  It can also increase your risk for osteoarthritis, sleep apnea, and other respiratory problems  Aim for a slow, steady weight loss  Even a small amount of weight loss can lower your risk of health problems  How to lose weight safely:  A safe and healthy way to lose weight is to eat fewer calories and get regular exercise  You can lose up about 1 pound a week by decreasing the number of calories you eat by 500 calories each day  Healthy meal plan for weight management:  A healthy meal plan includes a variety of foods, contains fewer calories, and helps you stay healthy  A healthy meal plan includes the following:  · Eat whole-grain foods more often  A healthy meal plan should contain fiber  Fiber is the part of grains, fruits, and vegetables that is not broken down by your body  Whole-grain foods are healthy and provide extra fiber in your diet  Some examples of whole-grain foods are whole-wheat breads and pastas, oatmeal, brown rice, and bulgur    · Eat a variety of vegetables every day  Include dark, leafy greens such as spinach, kale, ruslan greens, and mustard greens  Eat yellow and orange vegetables such as carrots, sweet potatoes, and winter squash  · Eat a variety of fruits every day  Choose fresh or canned fruit (canned in its own juice or light syrup) instead of juice  Fruit juice has very little or no fiber  · Eat low-fat dairy foods  Drink fat-free (skim) milk or 1% milk  Eat fat-free yogurt and low-fat cottage cheese  Try low-fat cheeses such as mozzarella and other reduced-fat cheeses  · Choose meat and other protein foods that are low in fat  Choose beans or other legumes such as split peas or lentils  Choose fish, skinless poultry (chicken or turkey), or lean cuts of red meat (beef or pork)  Before you cook meat or poultry, cut off any visible fat  · Use less fat and oil  Try baking foods instead of frying them  Add less fat, such as margarine, sour cream, regular salad dressing and mayonnaise to foods  Eat fewer high-fat foods  Some examples of high-fat foods include french fries, doughnuts, ice cream, and cakes  · Eat fewer sweets  Limit foods and drinks that are high in sugar  This includes candy, cookies, regular soda, and sweetened drinks  Exercise:  Exercise at least 30 minutes per day on most days of the week  Some examples of exercise include walking, biking, dancing, and swimming  You can also fit in more physical activity by taking the stairs instead of the elevator or parking farther away from stores  Ask your healthcare provider about the best exercise plan for you  © Copyright [employer] 2018 Information is for End User's use only and may not be sold, redistributed or otherwise used for commercial purposes   All illustrations and images included in CareNotes® are the copyrighted property of A D A M , Inc  or [address] [place] [address] [facility]

## 2022-02-28 NOTE — PROGRESS NOTES
Assessment and Plan:     Problem List Items Addressed This Visit     None           Preventive health issues were discussed with patient, and age appropriate screening tests were ordered as noted in patient's After Visit Summary  Personalized health advice and appropriate referrals for health education or preventive services given if needed, as noted in patient's After Visit Summary  History of Present Illness:     Patient presents for Medicare Annual Wellness visit    Patient Care Team:  Indigo Reese MD as PCP - General (Family Medicine)  Indigo Reese MD as PCP - [address] [place] [address] (RTE)     Problem List:     Patient Active Problem List   Diagnosis    Plantar fasciitis, bilateral    Essential hypertension    Other specified glaucoma    Annual physical exam    Dyslipidemia    Osteopenia of multiple sites    Diverticulosis    Anxiety state    Abnormal EKG    Acute knee pain    Chronic idiopathic constipation    Bloating    Family history of myocardial infarction    EKG abnormalities    Fatty liver    Generalized anxiety disorder    Poison ivy    Acute thoracic back pain    Thigh pain, musculoskeletal      Past Medical and Surgical History:     Past Medical History:   Diagnosis Date    Anxiety     Diverticulitis of colon     Fatty liver 6/17/2021    Glaucoma     H/O cardiovascular stress test 08/16/2021    Dr Wilfrido Sultana     Hypertension     Osteopenia      Past Surgical History:   Procedure Laterality Date    BREAST CYST EXCISION Right     benign - 2000 approx      COLONOSCOPY  2016    FOOT SURGERY Right     removal of cat tooth removed    MAMMO (HISTORICAL)  12/14/2018,11/10/17    TONSILLECTOMY      TOOTH EXTRACTION        Family History:     Family History   Problem Relation Age of Onset    Hyperlipidemia Mother     Hypertension Mother     Other Mother         cerebrovascular accident   [facility] Stroke Mother     Lung cancer Father     No Known Problems Brother     No Known Problems Maternal Aunt     No Known Problems Maternal Uncle     Breast cancer Paternal Aunt     No Known Problems Paternal Uncle     No Known Problems Maternal Grandmother     Heart disease Maternal Grandfather     Early death Maternal Grandfather 45        massive MI-congenital defect?  No Known Problems Paternal Grandmother     No Known Problems Paternal Grandfather     No Known Problems Brother     ADD / ADHD Neg Hx     Anesthesia problems Neg Hx     Cancer Neg Hx     Clotting disorder Neg Hx     Collagen disease Neg Hx     Diabetes Neg Hx     Dislocations Neg Hx     Learning disabilities Neg Hx     Neurological problems Neg Hx     Osteoporosis Neg Hx     Rheumatologic disease Neg Hx     Scoliosis Neg Hx     Vascular Disease Neg Hx       Social History:     Social History     Socioeconomic History    Marital status: /Civil Union     Spouse name: None    Number of children: None    Years of education: None    Highest education level: None   Occupational History    None   Tobacco Use    Smoking status: Never Smoker    Smokeless tobacco: Never Used   Vaping Use    Vaping Use: Never used   Substance and Sexual Activity    Alcohol use: Not Currently    Drug use: Never    Sexual activity: Yes     Partners: Male     Birth control/protection: Post-menopausal   Other Topics Concern    None   Social History Narrative    Tobacco smoking status:   Never smoker        Most recent tobacco use screenin2018          Alcohol intake:    Moderate    Per jackie     Social Determinants of Health     Financial Resource Strain: Not on file   Food Insecurity: Not on file   Transportation Needs: Not on file   Physical Activity: Not on file   Stress: Not on file   Social Connections: Not on file   Intimate Partner Violence: Not on file   Housing Stability: Not on file      Medications and Allergies:     Current Outpatient Medications   Medication Sig Dispense Refill    ALPRAZolam (XANAX) 0 25 mg tablet Take 1 tablet (0 25 mg total) by mouth daily at bedtime as needed for anxiety 30 tablet 0    Ascorbic Acid (vitamin C) 1000 MG tablet Take 1,000 mg by mouth every morning      atorvastatin (LIPITOR) 10 mg tablet take 1 tablet by mouth once daily 90 tablet 2    B Complex-C (B-complex with vitamin C) tablet Take 1 tablet by mouth every morning      butalbital-acetaminophen-caffeine (FIORICET,ESGIC) -40 mg per tablet Take 1 tablet by mouth as needed for headaches      Calcium Carb-Cholecalciferol (CALTRATE 600+D3 PO) Take by mouth every morning        carvedilol (COREG) 12 5 mg tablet take 1 tablet by mouth twice a day with meals 60 tablet 5    Cholecalciferol (Vitamin D) 50 MCG (2000 UT) CAPS Take by mouth every morning      Glucosamine-Chondroitin-MSM (GLUCOSAMINE CHONDROIT MSM DS PO) Take by mouth every morning 2 tabs      Lactobacillus-Inulin (CULTURELLE DIGESTIVE DAILY PO) Take by mouth every morning      losartan-hydrochlorothiazide (HYZAAR) 100-12 5 MG per tablet Take 1 tablet by mouth every morning 90 tablet 1    multivitamin (THERAGRAN) TABS Take 1 tablet by mouth every morning      timolol (TIMOPTIC) 0 5 % ophthalmic solution instill 1 drop into both eyes every morning      TURMERIC CURCUMIN PO Take 1,500 mg by mouth every morning Last dose 8/19/21      bisacodyl (DULCOLAX) 5 mg EC tablet Take 20 mg by mouth once (Patient not taking: Reported on 9/17/2021)      magnesium citrate solution Take 296 mL by mouth once (Patient not taking: Reported on 2/28/2022 )      Polyethylene Glycol 3350 (MIRALAX PO) Take by mouth once 238 gm (Patient not taking: Reported on 10/11/2021)      timolol (BETIMOL) 0 5 % ophthalmic solution Administer 1 drop to both eyes every morning  (Patient not taking: Reported on 10/11/2021)       No current facility-administered medications for this visit       Allergies   Allergen Reactions    Bee Venom Swelling Immunizations:     Immunization History   Administered Date(s) Administered    Pneumococcal Polysaccharide PPV23 01/18/2021    Rabies-IM Human Diploid Cell Culture 09/12/2014, 09/15/2014, 09/19/2014    Tdap 06/01/2016      Health Maintenance:         Topic Date Due    Breast Cancer Screening: Mammogram  01/22/2022    Colorectal Cancer Screening  08/23/2028    Hepatitis C Screening  Completed         Topic Date Due    COVID-19 Vaccine (1) Never done      Medicare Health Risk Assessment:     /70 (BP Location: Left arm, Patient Position: Sitting, Cuff Size: Standard)   Pulse 80   Temp 97 7 °F (36 5 °C) (Temporal)   Resp 16   Ht 5' 4 5" (1 638 m)   Wt 67 6 kg (149 lb)   SpO2 98%   BMI 25 18 kg/m²      Ling Gaona is here for her Initial Wellness visit  Health Risk Assessment:   Patient rates overall health as very good  Patient feels that their physical health rating is same  Patient is satisfied with their life  Eyesight was rated as same  Hearing was rated as same  Patient feels that their emotional and mental health rating is same  Patients states they are sometimes angry  Patient states they are never, rarely unusually tired/fatigued  Pain experienced in the last 7 days has been some  Patient's pain rating has been 2/10  Patient states that she has experienced no weight loss or gain in last 6 months  Depression Screening:   PHQ-2 Score: 0      Fall Risk Screening: In the past year, patient has experienced: history of falling in past year    Number of falls: 1  Injured during fall?: No    Feels unsteady when standing or walking?: No    Worried about falling?: No      Urinary Incontinence Screening:   Patient has not leaked urine accidently in the last six months  Home Safety:  Patient does not have trouble with stairs inside or outside of their home  Patient has working smoke alarms and has working carbon monoxide detector  Home safety hazards include: none       Nutrition:   Current diet is Regular and Low Cholesterol  Medications:   Patient is currently taking over-the-counter supplements  OTC medications include: see medication list  Patient is able to manage medications  Activities of Daily Living (ADLs)/Instrumental Activities of Daily Living (IADLs):   Walk and transfer into and out of bed and chair?: Yes  Dress and groom yourself?: Yes    Bathe or shower yourself?: Yes    Feed yourself? Yes  Do your laundry/housekeeping?: Yes  Manage your money, pay your bills and track your expenses?: Yes  Make your own meals?: Yes    Do your own shopping?: Yes    Previous Hospitalizations:   Any hospitalizations or ED visits within the last 12 months?: Yes    How many hospitalizations have you had in the last year?: 1-2    Advance Care Planning:   Living will: Yes    Advanced directive: Yes      Cognitive Screening:   Provider or family/friend/caregiver concerned regarding cognition?: No    PREVENTIVE SCREENINGS      Cardiovascular Screening:    General: Screening Not Indicated and History Lipid Disorder      Diabetes Screening:     General: Screening Current      Colorectal Cancer Screening:     General: Screening Current      Breast Cancer Screening:     General: Screening Current and Risks and Benefits Discussed    Due for: Mammogram        Cervical Cancer Screening:    General: Screening Not Indicated      Osteoporosis Screening:    General: Risks and Benefits Discussed    Due for: DXA Axial      Lung Cancer Screening:     General: Screening Not Indicated      Hepatitis C Screening:    General: Screening Current    Screening, Brief Intervention, and Referral to Treatment (SBIRT)    Screening  Typical number of drinks in a day: 0  Typical number of drinks in a week: 0  Interpretation: Low risk drinking behavior      Single Item Drug Screening:  How often have you used an illegal drug (including marijuana) or a prescription medication for non-medical reasons in the past year? never    Single Item Drug Screen Score: 0  Interpretation: Negative screen for possible drug use disorder      Tari Murray MD

## 2022-02-28 NOTE — PROGRESS NOTES
Assessment/Plan:         Problem List Items Addressed This Visit        Digestive    Diverticulosis     No flare ups colonoscopy ok          Fatty liver     Pt is watching diet not drinking alcohol check lfts            Cardiovascular and Mediastinum    Essential hypertension     Well controlled on current therapy continue with current medications and will reassess next visit           Relevant Orders    Comprehensive metabolic panel    RESOLVED: Migraine without aura and without status migrainosus, not intractable     Uses butalbital prn            Musculoskeletal and Integument    Osteopenia of multiple sites     Due for dexa              Other    Dyslipidemia     Due for labs on meds         Relevant Orders    Lipid panel    Generalized anxiety disorder - Primary     Ok on meds           Other Visit Diagnoses     Postmenopausal        Relevant Orders    DXA bone density spine hip and pelvis    Breast cancer screening by mammogram        Relevant Orders    Mammo screening bilateral w 3d & cad            Subjective: pt here for interval visit  HTN HL ASHLY fatty liver and arthritis back       Patient ID: Óscar Meek is a 77 y o  female  HPI    The following portions of the patient's history were reviewed and updated as appropriate:   Past Medical History:  She has a past medical history of Anxiety, Diverticulitis of colon, Fatty liver (6/17/2021), Glaucoma, H/O cardiovascular stress test (08/16/2021), Hypercholesteremia, Hypertension, and Osteopenia  ,  _______________________________________________________________________  Medical Problems:  does not have any pertinent problems on file ,  _______________________________________________________________________  Past Surgical History:   has a past surgical history that includes Tonsillectomy; Mammo (historical) (12/14/2018,11/10/17); Breast cyst excision (Right); Colonoscopy (2016);  Foot surgery (Right); and Tooth extraction  ,  _______________________________________________________________________  Family History:  family history includes Breast cancer in her paternal aunt; Early death (age of onset: 45) in her maternal grandfather; Heart disease in her maternal grandfather; Hyperlipidemia in her mother; Hypertension in her mother; Lung cancer in her father; No Known Problems in her brother, brother, maternal aunt, maternal grandmother, maternal uncle, paternal grandfather, paternal grandmother, and paternal uncle; Other in her mother; Stroke in her mother ,  _______________________________________________________________________  Social History:   reports that she has never smoked  She has never used smokeless tobacco  She reports previous alcohol use  She reports that she does not use drugs  ,  _______________________________________________________________________  Allergies:  is allergic to bee venom     _______________________________________________________________________  Current Outpatient Medications   Medication Sig Dispense Refill    ALPRAZolam (XANAX) 0 25 mg tablet Take 1 tablet (0 25 mg total) by mouth daily at bedtime as needed for anxiety 30 tablet 0    Ascorbic Acid (vitamin C) 1000 MG tablet Take 1,000 mg by mouth every morning      atorvastatin (LIPITOR) 10 mg tablet take 1 tablet by mouth once daily 90 tablet 2    B Complex-C (B-complex with vitamin C) tablet Take 1 tablet by mouth every morning      Calcium Carb-Cholecalciferol (CALTRATE 600+D3 PO) Take by mouth every morning        carvedilol (COREG) 12 5 mg tablet take 1 tablet by mouth twice a day with meals 60 tablet 5    Cholecalciferol (Vitamin D) 50 MCG (2000 UT) CAPS Take by mouth every morning      Glucosamine-Chondroitin-MSM (GLUCOSAMINE CHONDROIT MSM DS PO) Take by mouth every morning 2 tabs      Lactobacillus-Inulin (CULTURELLE DIGESTIVE DAILY PO) Take by mouth every morning      losartan-hydrochlorothiazide (HYZAAR) 100-12 5 MG per tablet Take 1 tablet by mouth every morning 90 tablet 1    multivitamin (THERAGRAN) TABS Take 1 tablet by mouth every morning      timolol (TIMOPTIC) 0 5 % ophthalmic solution instill 1 drop into both eyes every morning      TURMERIC CURCUMIN PO Take 1,500 mg by mouth every morning Last dose 8/19/21      magnesium citrate solution Take 296 mL by mouth once (Patient not taking: Reported on 2/28/2022 )      timolol (BETIMOL) 0 5 % ophthalmic solution Administer 1 drop to both eyes every morning  (Patient not taking: Reported on 10/11/2021)       No current facility-administered medications for this visit      _______________________________________________________________________  Review of Systems   Constitutional: Negative for appetite change, chills, fatigue and fever  Respiratory: Positive for cough (mikld with spring allergies) and wheezing (occasional)  Negative for choking, chest tightness and shortness of breath  Cardiovascular: Negative for chest pain, palpitations and leg swelling  Gastrointestinal: Negative for abdominal pain, constipation, diarrhea, nausea and vomiting  Genitourinary: Negative for difficulty urinating and frequency  Musculoskeletal: Negative for arthralgias, back pain and neck pain  Skin: Negative for rash  Neurological: Negative for dizziness, weakness, light-headedness, numbness and headaches  Hematological: Does not bruise/bleed easily  Psychiatric/Behavioral: Negative for dysphoric mood and sleep disturbance  The patient is not nervous/anxious  Objective:  Vitals:    02/28/22 1328   BP: 136/70   BP Location: Left arm   Patient Position: Sitting   Cuff Size: Standard   Pulse: 80   Resp: 16   Temp: 97 7 °F (36 5 °C)   TempSrc: Temporal   SpO2: 98%   Weight: 67 6 kg (149 lb)   Height: 5' 4 5" (1 638 m)     Body mass index is 25 18 kg/m²  Physical Exam  Vitals reviewed  Constitutional:       General: She is not in acute distress       Appearance: Normal appearance  She is well-developed  She is not ill-appearing  HENT:      Mouth/Throat:      Mouth: Mucous membranes are moist    Eyes:      Extraocular Movements: Extraocular movements intact  Conjunctiva/sclera: Conjunctivae normal       Pupils: Pupils are equal, round, and reactive to light  Neck:      Thyroid: No thyromegaly  Vascular: No carotid bruit  Cardiovascular:      Rate and Rhythm: Normal rate and regular rhythm  Pulses: Normal pulses  Heart sounds: Normal heart sounds  No murmur heard  Pulmonary:      Effort: Pulmonary effort is normal  No respiratory distress  Breath sounds: Normal breath sounds  Chest:      Chest wall: No tenderness  Breasts:      Right: Normal       Left: Normal        Abdominal:      General: Bowel sounds are normal  There is no distension  Palpations: Abdomen is soft  Tenderness: There is no abdominal tenderness  Musculoskeletal:      Cervical back: Normal range of motion and neck supple  Comments: Middle finger right index heberdens noduoe   Lymphadenopathy:      Cervical: No cervical adenopathy  Skin:     General: Skin is warm and dry  Neurological:      General: No focal deficit present  Mental Status: She is alert and oriented to person, place, and time  Mental status is at baseline  Cranial Nerves: No cranial nerve deficit        Deep Tendon Reflexes: Reflexes normal    Psychiatric:         Mood and Affect: Mood normal          Behavior: Behavior normal

## 2022-03-09 DIAGNOSIS — E78.5 HYPERLIPIDEMIA, UNSPECIFIED HYPERLIPIDEMIA TYPE: ICD-10-CM

## 2022-03-09 RX ORDER — ATORVASTATIN CALCIUM 10 MG/1
10 TABLET, FILM COATED ORAL DAILY
Qty: 90 TABLET | Refills: 2 | Status: SHIPPED | OUTPATIENT
Start: 2022-03-09 | End: 2022-03-14 | Stop reason: SDUPTHER

## 2022-03-11 ENCOUNTER — APPOINTMENT (OUTPATIENT)
Dept: LAB | Facility: CLINIC | Age: 67
End: 2022-03-11
Payer: COMMERCIAL

## 2022-03-11 DIAGNOSIS — I10 ESSENTIAL HYPERTENSION: ICD-10-CM

## 2022-03-11 DIAGNOSIS — E78.5 DYSLIPIDEMIA: ICD-10-CM

## 2022-03-11 LAB
ALBUMIN SERPL BCP-MCNC: 3.8 G/DL (ref 3.5–5)
ALP SERPL-CCNC: 46 U/L (ref 46–116)
ALT SERPL W P-5'-P-CCNC: 41 U/L (ref 12–78)
ANION GAP SERPL CALCULATED.3IONS-SCNC: 5 MMOL/L (ref 4–13)
AST SERPL W P-5'-P-CCNC: 25 U/L (ref 5–45)
BILIRUB SERPL-MCNC: 0.45 MG/DL (ref 0.2–1)
BUN SERPL-MCNC: 28 MG/DL (ref 5–25)
CALCIUM SERPL-MCNC: 9 MG/DL (ref 8.3–10.1)
CHLORIDE SERPL-SCNC: 105 MMOL/L (ref 100–108)
CHOLEST SERPL-MCNC: 169 MG/DL
CO2 SERPL-SCNC: 29 MMOL/L (ref 21–32)
CREAT SERPL-MCNC: 0.8 MG/DL (ref 0.6–1.3)
GFR SERPL CREATININE-BSD FRML MDRD: 77 ML/MIN/1.73SQ M
GLUCOSE P FAST SERPL-MCNC: 105 MG/DL (ref 65–99)
HDLC SERPL-MCNC: 67 MG/DL
LDLC SERPL CALC-MCNC: 91 MG/DL (ref 0–100)
NONHDLC SERPL-MCNC: 102 MG/DL
POTASSIUM SERPL-SCNC: 4 MMOL/L (ref 3.5–5.3)
PROT SERPL-MCNC: 7.4 G/DL (ref 6.4–8.2)
SODIUM SERPL-SCNC: 139 MMOL/L (ref 136–145)
TRIGL SERPL-MCNC: 57 MG/DL

## 2022-03-11 PROCEDURE — 80053 COMPREHEN METABOLIC PANEL: CPT

## 2022-03-11 PROCEDURE — 36415 COLL VENOUS BLD VENIPUNCTURE: CPT

## 2022-03-11 PROCEDURE — 80061 LIPID PANEL: CPT

## 2022-03-14 DIAGNOSIS — E78.5 HYPERLIPIDEMIA, UNSPECIFIED HYPERLIPIDEMIA TYPE: ICD-10-CM

## 2022-03-14 RX ORDER — ATORVASTATIN CALCIUM 10 MG/1
10 TABLET, FILM COATED ORAL DAILY
Qty: 90 TABLET | Refills: 2 | Status: SHIPPED | OUTPATIENT
Start: 2022-03-14

## 2022-03-14 NOTE — TELEPHONE ENCOUNTER
Looking for a refill for her Atorvastatin 10mg, 1x a day;    [address]    Patient ph # [phone]

## 2022-03-15 ENCOUNTER — TELEPHONE (OUTPATIENT)
Dept: FAMILY MEDICINE CLINIC | Facility: CLINIC | Age: 67
End: 2022-03-15

## 2022-03-15 DIAGNOSIS — I10 ESSENTIAL HYPERTENSION: ICD-10-CM

## 2022-03-15 NOTE — TELEPHONE ENCOUNTER
[place] said [facility] [employer] has been calling her couple times every day to ask about refilling her Atorvastatin  They've told her they are not getting a response from our office  Mail order meds are free for patient & she no longer wants to get it from [address] [place] [address]  Can someone please look into this?   Atorvastatin 10mg  [facility]

## 2022-03-15 NOTE — TELEPHONE ENCOUNTER
This was electronically sent to [facility] on 3/14/22 and they confirmed that they received it

## 2022-03-21 RX ORDER — CARVEDILOL 12.5 MG/1
12.5 TABLET ORAL 2 TIMES DAILY WITH MEALS
Qty: 180 TABLET | Refills: 3 | Status: SHIPPED | OUTPATIENT
Start: 2022-03-21

## 2022-03-24 DIAGNOSIS — I10 ESSENTIAL HYPERTENSION: ICD-10-CM

## 2022-04-12 ENCOUNTER — HOSPITAL ENCOUNTER (OUTPATIENT)
Dept: RADIOLOGY | Facility: HOSPITAL | Age: 67
Discharge: HOME | End: 2022-04-12
Payer: COMMERCIAL

## 2022-04-12 VITALS — HEIGHT: 65 IN | BODY MASS INDEX: 24.83 KG/M2 | WEIGHT: 149 LBS

## 2022-04-12 DIAGNOSIS — Z78.0 POSTMENOPAUSAL: ICD-10-CM

## 2022-04-12 DIAGNOSIS — Z12.31 BREAST CANCER SCREENING BY MAMMOGRAM: ICD-10-CM

## 2022-04-12 PROCEDURE — 77067 SCR MAMMO BI INCL CAD: CPT

## 2022-04-12 PROCEDURE — 77063 BREAST TOMOSYNTHESIS BI: CPT

## 2022-04-12 PROCEDURE — 77080 DXA BONE DENSITY AXIAL: CPT

## 2022-04-18 ENCOUNTER — TELEPHONE (OUTPATIENT)
Dept: FAMILY MEDICINE CLINIC | Facility: CLINIC | Age: 67
End: 2022-04-18

## 2022-04-18 NOTE — TELEPHONE ENCOUNTER
Patient called stating that she was told to take calcium and vitamin and eat calcium rich foods because her Dexa Scan got worse  She currently takes 5000 IU of Vitamin D 600mg calcium with D    She eats yogurt and drinks milk

## 2022-04-18 NOTE — TELEPHONE ENCOUNTER
Gave patient your message  Patient's dexa scan showed "significant loss" What can patient do about that? She has heard of injections?

## 2022-04-19 NOTE — TELEPHONE ENCOUNTER
She has "statistically" significant loss not the same as significant loss she doesn't have osteoporosis  but osteopenia if she wishes she can start fosomax  70mg po weekly

## 2022-04-22 ENCOUNTER — OFFICE VISIT (OUTPATIENT)
Dept: CARDIOLOGY CLINIC | Facility: CLINIC | Age: 67
End: 2022-04-22
Payer: COMMERCIAL

## 2022-04-22 VITALS
DIASTOLIC BLOOD PRESSURE: 80 MMHG | HEART RATE: 75 BPM | SYSTOLIC BLOOD PRESSURE: 144 MMHG | HEIGHT: 65 IN | TEMPERATURE: 97.7 F | WEIGHT: 152 LBS | OXYGEN SATURATION: 98 % | BODY MASS INDEX: 25.33 KG/M2

## 2022-04-22 DIAGNOSIS — R79.89 ELEVATED BRAIN NATRIURETIC PEPTIDE (BNP) LEVEL: ICD-10-CM

## 2022-04-22 DIAGNOSIS — E78.2 MIXED HYPERLIPIDEMIA: ICD-10-CM

## 2022-04-22 DIAGNOSIS — R60.0 BILATERAL LEG EDEMA: ICD-10-CM

## 2022-04-22 DIAGNOSIS — I10 ESSENTIAL HYPERTENSION: Primary | ICD-10-CM

## 2022-04-22 PROCEDURE — 3079F DIAST BP 80-89 MM HG: CPT | Performed by: INTERNAL MEDICINE

## 2022-04-22 PROCEDURE — 99214 OFFICE O/P EST MOD 30 MIN: CPT | Performed by: INTERNAL MEDICINE

## 2022-04-22 PROCEDURE — 3008F BODY MASS INDEX DOCD: CPT | Performed by: INTERNAL MEDICINE

## 2022-04-22 PROCEDURE — 1160F RVW MEDS BY RX/DR IN RCRD: CPT | Performed by: INTERNAL MEDICINE

## 2022-04-22 PROCEDURE — 3077F SYST BP >= 140 MM HG: CPT | Performed by: INTERNAL MEDICINE

## 2022-04-22 PROCEDURE — 93000 ELECTROCARDIOGRAM COMPLETE: CPT | Performed by: INTERNAL MEDICINE

## 2022-04-22 PROCEDURE — 1036F TOBACCO NON-USER: CPT | Performed by: INTERNAL MEDICINE

## 2022-04-22 NOTE — PROGRESS NOTES
[facility]  [address]   [place], [address]  Cardiology Follow-up    Alicia Dee  [id number]  1955      1  Essential hypertension  POCT ECG    Echo complete w/ contrast if indicated   2  Mixed hyperlipidemia  POCT ECG   3  Elevated brain natriuretic peptide (BNP) level  Echo complete w/ contrast if indicated   4  Bilateral leg edema  Echo complete w/ contrast if indicated      Discussion/Summary:   Exertional shortness of breath/malignant hypertension- elevated bp periodically  Elevated bnp in past  Diastolic dysfunction? Plan for echo 2d to better evaluate  Possible addition of low dose jardiance 10mg in addition to lorsartan-hctz  Hyperlipidemia/elevated LFTs- currently on atorvastatin 10 mg  Monitoring of her LFTs  Her last CT was negative for evidence for non alcoholic steatohepatitis  Family history for CVA- previous EKGs negative for atrial fibrillation  Previous Holter monitor was also negative  Varicose veins/swelling- compression sock    HPI:   79-year-old woman with family history for CVA, hypertension, hyperlipidemia presents with recent ER visit secondary to dizziness found to have hypertensive urgency  She states her blood pressures have been better since her medication regimen has been changed  She is trying to watch her sodium intake  She has previously had some exertional shortness of breath  She has also had some chest discomfort which she believes was a pulled muscle  She has been compliant with her medications  She denies having lower extremity swelling  She denies having fevers or chills  09/17/2021: We reviewed through her exercise stress test which showed no evidence of exercise-induced ischemia  Her blood pressure was controlled with exertion  She reports having some leg tightness  No overt edema  Plan to try compression  She has been compliant with medications      4/22/22: Blood pressure are elevated at times  Some dyspnea on exertion  No chest pain  Compliant with meds  PMH  hld- atorvastatin  Htn- bp meds    Social Hx    Retired  [employer]  Not formal exercise  No smoking  Cut out beer  Sleep- no snoring, no daytime fatigue    Family Hx  Grandfather- MI  Mother- stroke/htn      Past Medical History:   Diagnosis Date    Anxiety     Diverticulitis of colon     Fatty liver 2021    Glaucoma     H/O cardiovascular stress test 2021    Dr Angela Mcknight Hypertension     Osteopenia      Social History     Socioeconomic History    Marital status: /Civil Union     Spouse name: Not on file    Number of children: Not on file    Years of education: Not on file    Highest education level: Not on file   Occupational History    Not on file   Tobacco Use    Smoking status: Never Smoker    Smokeless tobacco: Never Used   Vaping Use    Vaping Use: Never used   Substance and Sexual Activity    Alcohol use: Not Currently    Drug use: Never    Sexual activity: Yes     Partners: Male     Birth control/protection: Post-menopausal   Other Topics Concern    Not on file   Social History Narrative    Tobacco smoking status:   Never smoker        Most recent tobacco use screenin2018          Alcohol intake:    Moderate    Per jackie     Social Determinants of Health     Financial Resource Strain: Not on file   Food Insecurity: Not on file   Transportation Needs: Not on file   Physical Activity: Not on file   Stress: Not on file   Social Connections: Not on file   Intimate Partner Violence: Not on file   Housing Stability: Not on file      Family History   Problem Relation Age of Onset    Hyperlipidemia Mother     Hypertension Mother     Other Mother         cerebrovascular accident   Rosadia Whelan Stroke Mother    Pema Whelan Lung cancer Father     No Known Problems Brother     No Known Problems Maternal Aunt     Breast cancer Paternal Aunt     No Known Problems Maternal Grandmother     Heart disease Maternal Grandfather     Early death Maternal Grandfather 45        massive MI-congenital defect?  No Known Problems Paternal Grandmother     No Known Problems Paternal Grandfather     No Known Problems Brother     Bone cancer Cousin     ADD / ADHD Neg Hx     Anesthesia problems Neg Hx     Cancer Neg Hx     Clotting disorder Neg Hx     Collagen disease Neg Hx     Diabetes Neg Hx     Dislocations Neg Hx     Learning disabilities Neg Hx     Neurological problems Neg Hx     Osteoporosis Neg Hx     Rheumatologic disease Neg Hx     Scoliosis Neg Hx     Vascular Disease Neg Hx      Past Surgical History:   Procedure Laterality Date    BREAST CYST EXCISION Right     benign - 2000 approx      COLONOSCOPY  2016    FOOT SURGERY Right     removal of cat tooth removed    MAMMO (HISTORICAL)  12/14/2018,11/10/17    TONSILLECTOMY      TOOTH EXTRACTION         Current Outpatient Medications:     ALPRAZolam (XANAX) 0 25 mg tablet, Take 1 tablet (0 25 mg total) by mouth daily at bedtime as needed for anxiety, Disp: 30 tablet, Rfl: 0    Ascorbic Acid (vitamin C) 1000 MG tablet, Take 1,000 mg by mouth every morning, Disp: , Rfl:     atorvastatin (LIPITOR) 10 mg tablet, Take 1 tablet (10 mg total) by mouth daily, Disp: 90 tablet, Rfl: 2    B Complex-C (B-complex with vitamin C) tablet, Take 1 tablet by mouth every morning, Disp: , Rfl:     Calcium Carb-Cholecalciferol (CALTRATE 600+D3 PO), Take by mouth every morning  , Disp: , Rfl:     carvedilol (COREG) 12 5 mg tablet, Take 1 tablet (12 5 mg total) by mouth 2 (two) times a day with meals, Disp: 180 tablet, Rfl: 3    Cholecalciferol (Vitamin D) 50 MCG (2000 UT) CAPS, Take by mouth every morning, Disp: , Rfl:     Glucosamine-Chondroitin-MSM (GLUCOSAMINE CHONDROIT MSM DS PO), Take by mouth every morning 2 tabs, Disp: , Rfl:     Lactobacillus-Inulin (CULTURELLE DIGESTIVE DAILY PO), Take by mouth every morning, Disp: , Rfl:     losartan-hydrochlorothiazide (HYZAAR) 100-12 5 MG per tablet, Take 1 tablet by mouth every morning, Disp: 90 tablet, Rfl: 1    multivitamin (THERAGRAN) TABS, Take 1 tablet by mouth every morning, Disp: , Rfl:     timolol (BETIMOL) 0 5 % ophthalmic solution, Administer 1 drop to both eyes every morning  , Disp: , Rfl:     timolol (TIMOPTIC) 0 5 % ophthalmic solution, instill 1 drop into both eyes every morning, Disp: , Rfl:     TURMERIC CURCUMIN PO, Take 1,500 mg by mouth every morning Last dose 8/19/21, Disp: , Rfl:     magnesium citrate solution, Take 296 mL by mouth once (Patient not taking: Reported on 2/28/2022 ), Disp: , Rfl:   Allergies   Allergen Reactions    Bee Venom Swelling     Vitals:    04/22/22 1336   BP: 144/80   BP Location: Right arm   Patient Position: Sitting   Cuff Size: Standard   Pulse: 75   Temp: 97 7 °F (36 5 °C)   SpO2: 98%   Weight: 68 9 kg (152 lb)   Height: 5' 4 5" (1 638 m)       Review of Systems:   Review of Systems   Constitutional: Negative  Negative for activity change, appetite change, chills, diaphoresis, fatigue, fever and unexpected weight change  HENT: Negative  Negative for congestion, dental problem, drooling, ear discharge, ear pain, facial swelling, hearing loss, mouth sores, nosebleeds, postnasal drip, rhinorrhea, sinus pressure, sinus pain, sneezing, sore throat, tinnitus, trouble swallowing and voice change  Eyes: Negative  Negative for photophobia, pain, redness, itching and visual disturbance  Respiratory: Positive for shortness of breath  Negative for apnea, cough, choking, chest tightness, wheezing and stridor  Cardiovascular: Negative  Negative for chest pain, palpitations and leg swelling  Gastrointestinal: Negative  Negative for abdominal distention, abdominal pain, anal bleeding, blood in stool, constipation, diarrhea, nausea, rectal pain and vomiting  Endocrine: Negative    Negative for cold intolerance, heat intolerance, polydipsia, polyphagia and polyuria  Genitourinary: Negative  Negative for decreased urine volume, difficulty urinating, dyspareunia, dysuria, enuresis, flank pain, frequency, genital sores, hematuria, menstrual problem, pelvic pain, urgency, vaginal bleeding, vaginal discharge and vaginal pain  Musculoskeletal: Negative  Negative for arthralgias, back pain, gait problem, joint swelling, myalgias, neck pain and neck stiffness  Skin: Negative  Negative for color change, pallor, rash and wound  Allergic/Immunologic: Negative  Negative for environmental allergies, food allergies and immunocompromised state  Neurological: Positive for dizziness  Negative for tremors, seizures, syncope, facial asymmetry, speech difficulty, weakness, light-headedness, numbness and headaches  Hematological: Negative  Negative for adenopathy  Does not bruise/bleed easily  Psychiatric/Behavioral: Negative  Negative for agitation, behavioral problems, confusion, decreased concentration, dysphoric mood, hallucinations, self-injury, sleep disturbance and suicidal ideas  The patient is not nervous/anxious and is not hyperactive  All other systems reviewed and are negative  Vitals:    04/22/22 1336   BP: 144/80   BP Location: Right arm   Patient Position: Sitting   Cuff Size: Standard   Pulse: 75   Temp: 97 7 °F (36 5 °C)   SpO2: 98%   Weight: 68 9 kg (152 lb)   Height: 5' 4 5" (1 638 m)     Physical Examination:   Physical Exam  Constitutional:       General: She is not in acute distress  Appearance: She is well-developed  She is not diaphoretic  HENT:      Head: Normocephalic and atraumatic  Right Ear: External ear normal       Left Ear: External ear normal    Eyes:      General: No scleral icterus  Right eye: No discharge  Left eye: No discharge  Conjunctiva/sclera: Conjunctivae normal       Pupils: Pupils are equal, round, and reactive to light  Neck:      Thyroid: No thyromegaly  Vascular: No JVD  Trachea: No tracheal deviation  Cardiovascular:      Rate and Rhythm: Normal rate and regular rhythm  Heart sounds: No murmur heard  No friction rub  Gallop present  Pulmonary:      Effort: Pulmonary effort is normal  No respiratory distress  Breath sounds: Normal breath sounds  No stridor  No wheezing or rales  Chest:      Chest wall: No tenderness  Abdominal:      General: Bowel sounds are normal  There is no distension  Palpations: Abdomen is soft  There is no mass  Tenderness: There is no abdominal tenderness  There is no guarding or rebound  Musculoskeletal:         General: No tenderness or deformity  Normal range of motion  Cervical back: Normal range of motion and neck supple  Skin:     General: Skin is warm and dry  Coloration: Skin is not pale  Findings: No erythema or rash  Neurological:      Mental Status: She is alert and oriented to person, place, and time  Cranial Nerves: No cranial nerve deficit  Motor: No abnormal muscle tone  Coordination: Coordination normal       Deep Tendon Reflexes: Reflexes are normal and symmetric  Reflexes normal    Psychiatric:         Behavior: Behavior normal          Thought Content:  Thought content normal          Judgment: Judgment normal          Labs:     Lab Results   Component Value Date    WBC 6 02 07/22/2021    HGB 13 1 07/22/2021    HCT 40 4 07/22/2021    MCV 91 07/22/2021    RDW 12 6 07/22/2021     07/22/2021     BMP:  Lab Results   Component Value Date    SODIUM 139 03/11/2022    K 4 0 03/11/2022     03/11/2022    CO2 29 03/11/2022    BUN 28 (H) 03/11/2022    CREATININE 0 80 03/11/2022    GLUC 110 07/29/2021    GLUF 105 (H) 03/11/2022    CALCIUM 9 0 03/11/2022    EGFR 77 03/11/2022    MG 1 9 07/22/2021     LFT:  Lab Results   Component Value Date    AST 25 03/11/2022    ALT 41 03/11/2022    ALKPHOS 46 03/11/2022    TP 7 4 03/11/2022    ALB 3 8 03/11/2022 No results found for: [facility]  Lab Results   Component Value Date    HGBA1C 5 1 10/30/2020     Lipid Profile:   Lab Results   Component Value Date    CHOLESTEROL 169 03/11/2022    HDL 67 03/11/2022    LDLCALC 91 03/11/2022    TRIG 57 03/11/2022     Lab Results   Component Value Date    CHOLESTEROL 169 03/11/2022    CHOLESTEROL 159 10/30/2020     Lab Results   Component Value Date    TROPONINI <0 02 07/22/2021     Lab Results   Component Value Date    NTBNP 501 (H) 07/22/2021      No results found for this or any previous visit (from the past 672 hour(s))  Imaging & Testing   I have personally reviewed pertinent reports  Cardiac Testing     EKG: Personally reviewed  Reviewed previous EKG normal sinus rhythm no acute ST changes      Vimal Araujo MD [facility]  [phone]  Please call with any questions or suggestions    Counseling :  A description of the counseling:   Goals and Barriers:  Patient's ability to self care:  Medication side effect reviewed with patient in detail and all their questions answered  "Portions of the record may have been created with voice recognition software  Occasional wrong word or "sound a like" substitutions may have occurred due to the inherent limitations of voice recognition software  Read the chart carefully and recognize, using context, where substitutions have occurred   Please call if you have any questions  "

## 2022-05-02 ENCOUNTER — RA CDI HCC (OUTPATIENT)
Dept: OTHER | Facility: HOSPITAL | Age: 67
End: 2022-05-02

## 2022-05-02 NOTE — PROGRESS NOTES
[facility]  coding opportunities       Chart reviewed, no opportunity found:   [address]        Patients Insurance     Medicare Insurance: [facility]

## 2022-05-04 ENCOUNTER — OFFICE VISIT (OUTPATIENT)
Dept: FAMILY MEDICINE CLINIC | Facility: CLINIC | Age: 67
End: 2022-05-04
Payer: COMMERCIAL

## 2022-05-04 VITALS
BODY MASS INDEX: 25.49 KG/M2 | HEIGHT: 65 IN | OXYGEN SATURATION: 98 % | RESPIRATION RATE: 16 BRPM | DIASTOLIC BLOOD PRESSURE: 68 MMHG | SYSTOLIC BLOOD PRESSURE: 120 MMHG | TEMPERATURE: 97.3 F | HEART RATE: 72 BPM | WEIGHT: 153 LBS

## 2022-05-04 DIAGNOSIS — R20.2 TINGLING SENSATION: ICD-10-CM

## 2022-05-04 DIAGNOSIS — I10 ESSENTIAL HYPERTENSION: ICD-10-CM

## 2022-05-04 DIAGNOSIS — R73.09 ABNORMAL GLUCOSE: Primary | ICD-10-CM

## 2022-05-04 DIAGNOSIS — E55.9 VITAMIN D DEFICIENCY: ICD-10-CM

## 2022-05-04 LAB — SL AMB POCT HEMOGLOBIN AIC: 5.2 (ref ?–6.5)

## 2022-05-04 PROCEDURE — 3078F DIAST BP <80 MM HG: CPT | Performed by: FAMILY MEDICINE

## 2022-05-04 PROCEDURE — 3008F BODY MASS INDEX DOCD: CPT | Performed by: FAMILY MEDICINE

## 2022-05-04 PROCEDURE — 83036 HEMOGLOBIN GLYCOSYLATED A1C: CPT | Performed by: FAMILY MEDICINE

## 2022-05-04 PROCEDURE — 1160F RVW MEDS BY RX/DR IN RCRD: CPT | Performed by: FAMILY MEDICINE

## 2022-05-04 PROCEDURE — 3074F SYST BP LT 130 MM HG: CPT | Performed by: FAMILY MEDICINE

## 2022-05-04 PROCEDURE — 1036F TOBACCO NON-USER: CPT | Performed by: FAMILY MEDICINE

## 2022-05-04 PROCEDURE — 99214 OFFICE O/P EST MOD 30 MIN: CPT | Performed by: FAMILY MEDICINE

## 2022-05-04 PROCEDURE — 3725F SCREEN DEPRESSION PERFORMED: CPT | Performed by: FAMILY MEDICINE

## 2022-05-04 NOTE — ASSESSMENT & PLAN NOTE
Sporadic sensation skin left upper chest since mammo likely irritation of cutaneous nerve ( nl stress test)

## 2022-05-04 NOTE — PROGRESS NOTES
Assessment/Plan:         Problem List Items Addressed This Visit        Cardiovascular and Mediastinum    Essential hypertension     Well controlled on current therapy continue with current medications and will reassess next visit              Other    Abnormal glucose - Primary     Check hga1c         Relevant Orders    POCT hemoglobin A1c (Completed)    Vitamin D deficiency     Check level pt on 5000 units which  She may not need         Relevant Orders    Vitamin D 25 hydroxy    Tingling sensation     Sporadic sensation skin left upper chest since mammo likely irritation of cutaneous nerve ( nl stress test)                 Subjective: pt here for some episodes of tingling left breast since mammo pt with sensation something crawling only seconds pt seeing cardiology had stress test negative will be having echo ;also mnoitoring bp  At home very good readings feels she has a scab on top of scalp concerned it is a skn cancer     Patient ID: Kaen Jimenez is a 77 y o  female  HPI    The following portions of the patient's history were reviewed and updated as appropriate:   Past Medical History:  She has a past medical history of Anxiety, Diverticulitis of colon, Fatty liver (6/17/2021), Glaucoma, H/O cardiovascular stress test (08/16/2021), Hypercholesteremia, Hypertension, and Osteopenia  ,  _______________________________________________________________________  Medical Problems:  does not have any pertinent problems on file ,  _______________________________________________________________________  Past Surgical History:   has a past surgical history that includes Tonsillectomy; Mammo (historical) (12/14/2018,11/10/17); Breast cyst excision (Right); Colonoscopy (2016); Foot surgery (Right); and Tooth extraction  ,  _______________________________________________________________________  Family History:  family history includes Bone cancer in her cousin; Breast cancer in her paternal aunt; Early death (age of onset: 45) in her maternal grandfather; Heart disease in her maternal grandfather; Hyperlipidemia in her mother; Hypertension in her mother; Lung cancer in her father; No Known Problems in her brother, brother, maternal aunt, maternal grandmother, paternal grandfather, and paternal grandmother; Other in her mother; Stroke in her mother ,  _______________________________________________________________________  Social History:   reports that she has never smoked  She has never used smokeless tobacco  She reports previous alcohol use  She reports that she does not use drugs  ,  _______________________________________________________________________  Allergies:  is allergic to bee venom     _______________________________________________________________________  Current Outpatient Medications   Medication Sig Dispense Refill    ALPRAZolam (XANAX) 0 25 mg tablet Take 1 tablet (0 25 mg total) by mouth daily at bedtime as needed for anxiety 30 tablet 0    Ascorbic Acid (vitamin C) 1000 MG tablet Take 1,000 mg by mouth every morning      atorvastatin (LIPITOR) 10 mg tablet Take 1 tablet (10 mg total) by mouth daily 90 tablet 2    B Complex-C (B-complex with vitamin C) tablet Take 1 tablet by mouth every morning      Calcium Carb-Cholecalciferol (CALTRATE 600+D3 PO) Take by mouth every morning        carvedilol (COREG) 12 5 mg tablet Take 1 tablet (12 5 mg total) by mouth 2 (two) times a day with meals 180 tablet 3    Cholecalciferol (Vitamin D) 50 MCG (2000 UT) CAPS Take by mouth every morning      Glucosamine-Chondroitin-MSM (GLUCOSAMINE CHONDROIT MSM DS PO) Take by mouth every morning 2 tabs      Lactobacillus-Inulin (CULTURELLE DIGESTIVE DAILY PO) Take by mouth every morning      losartan-hydrochlorothiazide (HYZAAR) 100-12 5 MG per tablet Take 1 tablet by mouth every morning 90 tablet 1    multivitamin (THERAGRAN) TABS Take 1 tablet by mouth every morning      timolol (TIMOPTIC) 0 5 % ophthalmic solution instill 1 drop into both eyes every morning      TURMERIC CURCUMIN PO Take 1,500 mg by mouth every morning Last dose 8/19/21      magnesium citrate solution Take 296 mL by mouth once (Patient not taking: Reported on 2/28/2022 )      timolol (BETIMOL) 0 5 % ophthalmic solution Administer 1 drop to both eyes every morning   (Patient not taking: Reported on 5/4/2022 )       No current facility-administered medications for this visit      _______________________________________________________________________  Review of Systems   Respiratory: Negative for cough, chest tightness and shortness of breath  Cardiovascular: Negative for chest pain, palpitations and leg swelling  Skin:        Scab top of head; tingly sensation lasting seconds left upper chest since mammo   Neurological: Negative for dizziness, weakness, light-headedness and headaches  Psychiatric/Behavioral: Negative for dysphoric mood  The patient is not nervous/anxious  Objective:  Vitals:    05/04/22 0844 05/04/22 0856   BP: 130/90 120/68   BP Location: Left arm    Patient Position: Sitting    Cuff Size: Standard    Pulse: 72    Resp: 16    Temp: (!) 97 3 °F (36 3 °C)    TempSrc: Temporal    SpO2: 98%    Weight: 69 4 kg (153 lb)    Height: 5' 4 5" (1 638 m)      Body mass index is 25 86 kg/m²  Physical Exam  Constitutional:       General: She is not in acute distress  Appearance: Normal appearance  She is well-developed  She is obese  She is not ill-appearing  Eyes:      Extraocular Movements: Extraocular movements intact  Pupils: Pupils are equal, round, and reactive to light  Cardiovascular:      Rate and Rhythm: Normal rate and regular rhythm  Pulses: Normal pulses  Heart sounds: Normal heart sounds  No murmur heard  Pulmonary:      Effort: Pulmonary effort is normal       Breath sounds: Normal breath sounds  Musculoskeletal:      Right lower leg: No edema  Left lower leg: No edema     Skin: Comments: Top of scalp near part 3mm area of dry flaky skin no lesiom   Neurological:      General: No focal deficit present  Mental Status: She is alert and oriented to person, place, and time  Mental status is at baseline  Psychiatric:         Mood and Affect: Mood normal          Behavior: Behavior normal          Thought Content:  Thought content normal

## 2022-05-13 ENCOUNTER — APPOINTMENT (OUTPATIENT)
Dept: LAB | Facility: HOSPITAL | Age: 67
End: 2022-05-13
Payer: COMMERCIAL

## 2022-05-13 ENCOUNTER — HOSPITAL ENCOUNTER (OUTPATIENT)
Dept: NON INVASIVE DIAGNOSTICS | Facility: HOSPITAL | Age: 67
Discharge: HOME | End: 2022-05-13
Attending: INTERNAL MEDICINE
Payer: COMMERCIAL

## 2022-05-13 VITALS
DIASTOLIC BLOOD PRESSURE: 87 MMHG | SYSTOLIC BLOOD PRESSURE: 144 MMHG | HEIGHT: 65 IN | BODY MASS INDEX: 25.49 KG/M2 | HEART RATE: 59 BPM | WEIGHT: 153 LBS

## 2022-05-13 DIAGNOSIS — I10 ESSENTIAL HYPERTENSION: ICD-10-CM

## 2022-05-13 DIAGNOSIS — R79.89 ELEVATED BRAIN NATRIURETIC PEPTIDE (BNP) LEVEL: ICD-10-CM

## 2022-05-13 DIAGNOSIS — E55.9 VITAMIN D DEFICIENCY: ICD-10-CM

## 2022-05-13 DIAGNOSIS — R60.0 BILATERAL LEG EDEMA: ICD-10-CM

## 2022-05-13 LAB
25(OH)D3 SERPL-MCNC: 35.9 NG/ML (ref 30–100)
AORTIC ROOT: 2.7 CM
APICAL FOUR CHAMBER EJECTION FRACTION: 58 %
AV LVOT PEAK GRADIENT: 6 MMHG
AV PEAK GRADIENT: 7 MMHG
DOP CALC LVOT AREA: 2.83 CM2
DOP CALC LVOT DIAMETER: 1.9 CM
E WAVE DECELERATION TIME: 157 MS
FRACTIONAL SHORTENING: 29 % (ref 28–44)
INTERVENTRICULAR SEPTUM IN DIASTOLE (PARASTERNAL SHORT AXIS VIEW): 0.9 CM
INTERVENTRICULAR SEPTUM: 0.9 CM (ref 0.6–1.1)
LAAS-AP2: 13.6 CM2
LAAS-AP4: 13.8 CM2
LEFT ATRIUM AREA SYSTOLE SINGLE PLANE A4C: 15 CM2
LEFT ATRIUM SIZE: 2.9 CM
LEFT INTERNAL DIMENSION IN SYSTOLE: 3 CM (ref 2.1–4)
LEFT VENTRICULAR INTERNAL DIMENSION IN DIASTOLE: 4.2 CM (ref 3.5–6)
LEFT VENTRICULAR POSTERIOR WALL IN END DIASTOLE: 0.9 CM
LEFT VENTRICULAR STROKE VOLUME: 41 ML
LVSV (TEICH): 41 ML
MV E'TISSUE VEL-LAT: 9 CM/S
MV E'TISSUE VEL-SEP: 7 CM/S
MV PEAK A VEL: 0.9 M/S
MV PEAK E VEL: 84 CM/S
MV STENOSIS PRESSURE HALF TIME: 45 MS
MV VALVE AREA P 1/2 METHOD: 4.89 CM2
PV PEAK GRADIENT: 4 MMHG
RIGHT ATRIUM AREA SYSTOLE A4C: 11.9 CM2
RIGHT VENTRICLE ID DIMENSION: 2.6 CM
SL CV LEFT ATRIUM LENGTH A2C: 3.8 CM
SL CV LV EF: 55
SL CV PED ECHO LEFT VENTRICLE DIASTOLIC VOLUME (MOD BIPLANE) 2D: 77 ML
SL CV PED ECHO LEFT VENTRICLE SYSTOLIC VOLUME (MOD BIPLANE) 2D: 36 ML
TR MAX PG: 24 MMHG
TR PEAK VELOCITY: 2.5 M/S
TRICUSPID VALVE PEAK REGURGITATION VELOCITY: 2.46 M/S

## 2022-05-13 PROCEDURE — 93306 TTE W/DOPPLER COMPLETE: CPT | Performed by: INTERNAL MEDICINE

## 2022-05-13 PROCEDURE — 93306 TTE W/DOPPLER COMPLETE: CPT

## 2022-05-13 PROCEDURE — 82306 VITAMIN D 25 HYDROXY: CPT

## 2022-05-13 PROCEDURE — 36415 COLL VENOUS BLD VENIPUNCTURE: CPT

## 2022-05-20 RX ORDER — LOSARTAN POTASSIUM AND HYDROCHLOROTHIAZIDE 12.5; 1 MG/1; MG/1
TABLET ORAL
Qty: 90 TABLET | Refills: 1 | Status: SHIPPED | OUTPATIENT
Start: 2022-05-20 | End: 2022-08-03 | Stop reason: SDUPTHER

## 2022-06-28 ENCOUNTER — OFFICE VISIT (OUTPATIENT)
Dept: FAMILY MEDICINE CLINIC | Facility: CLINIC | Age: 67
End: 2022-06-28
Payer: COMMERCIAL

## 2022-06-28 VITALS
OXYGEN SATURATION: 99 % | HEART RATE: 70 BPM | WEIGHT: 150 LBS | SYSTOLIC BLOOD PRESSURE: 120 MMHG | BODY MASS INDEX: 24.99 KG/M2 | TEMPERATURE: 98.6 F | RESPIRATION RATE: 16 BRPM | HEIGHT: 65 IN | DIASTOLIC BLOOD PRESSURE: 62 MMHG

## 2022-06-28 DIAGNOSIS — I10 ESSENTIAL HYPERTENSION: Primary | ICD-10-CM

## 2022-06-28 DIAGNOSIS — M85.89 OSTEOPENIA OF MULTIPLE SITES: ICD-10-CM

## 2022-06-28 DIAGNOSIS — F41.1 GENERALIZED ANXIETY DISORDER: ICD-10-CM

## 2022-06-28 DIAGNOSIS — E55.9 VITAMIN D DEFICIENCY: ICD-10-CM

## 2022-06-28 PROBLEM — R73.09 ABNORMAL GLUCOSE: Status: RESOLVED | Noted: 2022-05-04 | Resolved: 2022-06-28

## 2022-06-28 PROBLEM — R20.2 TINGLING SENSATION: Status: RESOLVED | Noted: 2022-05-04 | Resolved: 2022-06-28

## 2022-06-28 PROCEDURE — 1160F RVW MEDS BY RX/DR IN RCRD: CPT | Performed by: FAMILY MEDICINE

## 2022-06-28 PROCEDURE — 3078F DIAST BP <80 MM HG: CPT | Performed by: FAMILY MEDICINE

## 2022-06-28 PROCEDURE — 3008F BODY MASS INDEX DOCD: CPT | Performed by: FAMILY MEDICINE

## 2022-06-28 PROCEDURE — 3074F SYST BP LT 130 MM HG: CPT | Performed by: FAMILY MEDICINE

## 2022-06-28 PROCEDURE — 1036F TOBACCO NON-USER: CPT | Performed by: FAMILY MEDICINE

## 2022-06-28 PROCEDURE — 99214 OFFICE O/P EST MOD 30 MIN: CPT | Performed by: FAMILY MEDICINE

## 2022-06-28 NOTE — PROGRESS NOTES
Assessment/Plan:         Problem List Items Addressed This Visit        Cardiovascular and Mediastinum    Essential hypertension - Primary     Many all excellent readings at home              Musculoskeletal and Integument    Osteopenia of multiple sites     Vit d calcium and weight  Bearing exercises              Other    Generalized anxiety disorder     Ok on  meds           Vitamin D deficiency     Nl level 2000 units daily                   Subjective: pt here for interval visit HTN HL ASHLY     Patient ID: Lesley Comer is a 77 y o  female  HPI    The following portions of the patient's history were reviewed and updated as appropriate:   Past Medical History:  She has a past medical history of Anxiety, Diverticulitis of colon, Fatty liver (6/17/2021), Glaucoma, H/O cardiovascular stress test (08/16/2021), Hypercholesteremia, Hypertension, and Osteopenia  ,  _______________________________________________________________________  Medical Problems:  does not have any pertinent problems on file ,  _______________________________________________________________________  Past Surgical History:   has a past surgical history that includes Tonsillectomy; Mammo (historical) (12/14/2018,11/10/17); Breast cyst excision (Right); Colonoscopy (2016); Foot surgery (Right); and Tooth extraction  ,  _______________________________________________________________________  Family History:  family history includes Bone cancer in her cousin; Breast cancer in her paternal aunt; Early death (age of onset: 45) in her maternal grandfather; Heart disease in her maternal grandfather; Hyperlipidemia in her mother; Hypertension in her mother; Lung cancer in her father; No Known Problems in her brother, brother, maternal aunt, maternal grandmother, paternal grandfather, and paternal grandmother;  Other in her mother; Stroke in her mother ,  _______________________________________________________________________  Social History: reports that she has never smoked  She has never used smokeless tobacco  She reports previous alcohol use  She reports that she does not use drugs  ,  _______________________________________________________________________  Allergies:  is allergic to bee venom     _______________________________________________________________________  Current Outpatient Medications   Medication Sig Dispense Refill    ALPRAZolam (XANAX) 0 25 mg tablet Take 1 tablet (0 25 mg total) by mouth daily at bedtime as needed for anxiety 30 tablet 0    Ascorbic Acid (vitamin C) 1000 MG tablet Take 1,000 mg by mouth every morning      atorvastatin (LIPITOR) 10 mg tablet Take 1 tablet (10 mg total) by mouth daily 90 tablet 2    B Complex-C (B-complex with vitamin C) tablet Take 1 tablet by mouth every morning      Calcium Carb-Cholecalciferol (CALTRATE 600+D3 PO) Take by mouth every morning        carvedilol (COREG) 12 5 mg tablet Take 1 tablet (12 5 mg total) by mouth 2 (two) times a day with meals 180 tablet 3    Cholecalciferol (Vitamin D) 50 MCG (2000 UT) CAPS Take by mouth every morning      Glucosamine-Chondroitin-MSM (GLUCOSAMINE CHONDROIT MSM DS PO) Take by mouth every morning 2 tabs      Lactobacillus-Inulin (CULTURELLE DIGESTIVE DAILY PO) Take by mouth every morning      losartan-hydrochlorothiazide (HYZAAR) 100-12 5 MG per tablet take 1 tablet by mouth once daily 90 tablet 1    multivitamin (THERAGRAN) TABS Take 1 tablet by mouth every morning      timolol (BETIMOL) 0 5 % ophthalmic solution Administer 1 drop to both eyes every morning      TURMERIC CURCUMIN PO Take 1,500 mg by mouth every morning Last dose 8/19/21      magnesium citrate solution Take 296 mL by mouth once (Patient not taking: No sig reported)      timolol (TIMOPTIC) 0 5 % ophthalmic solution instill 1 drop into both eyes every morning (Patient not taking: Reported on 6/28/2022)       No current facility-administered medications for this visit  _______________________________________________________________________  Review of Systems   Constitutional: Negative for appetite change, chills, fatigue and fever  Respiratory: Negative for cough, chest tightness and shortness of breath  Cardiovascular: Negative for chest pain, palpitations and leg swelling  Gastrointestinal: Negative for abdominal pain, constipation, diarrhea, nausea and vomiting  Genitourinary: Negative for difficulty urinating and frequency  Musculoskeletal: Negative for arthralgias, back pain and neck pain  Skin: Negative for rash  Neurological: Negative for dizziness, weakness, light-headedness, numbness and headaches  Hematological: Does not bruise/bleed easily  Psychiatric/Behavioral: Negative for dysphoric mood and sleep disturbance  The patient is not nervous/anxious  Objective:  Vitals:    06/28/22 1306 06/28/22 1322   BP: 154/90 120/62   BP Location: Left arm    Patient Position: Sitting    Cuff Size: Standard    Pulse: 70    Resp: 16    Temp: 98 6 °F (37 °C)    TempSrc: Temporal    SpO2: 99%    Weight: 68 kg (150 lb)    Height: 5' 5" (1 651 m)      Body mass index is 24 96 kg/m²  Physical Exam  Vitals reviewed  Constitutional:       General: She is not in acute distress  Appearance: Normal appearance  She is well-developed  She is not ill-appearing  HENT:      Mouth/Throat:      Mouth: Mucous membranes are moist    Eyes:      Extraocular Movements: Extraocular movements intact  Conjunctiva/sclera: Conjunctivae normal       Pupils: Pupils are equal, round, and reactive to light  Neck:      Thyroid: No thyromegaly  Vascular: No carotid bruit  Cardiovascular:      Rate and Rhythm: Normal rate and regular rhythm  Pulses: Normal pulses  Heart sounds: Normal heart sounds  No murmur heard  Pulmonary:      Effort: Pulmonary effort is normal  No respiratory distress  Breath sounds: Normal breath sounds     Chest: Chest wall: No tenderness  Abdominal:      General: Bowel sounds are normal  There is no distension  Palpations: Abdomen is soft  Tenderness: There is no abdominal tenderness  Musculoskeletal:      Cervical back: Normal range of motion and neck supple  Lymphadenopathy:      Cervical: No cervical adenopathy  Skin:     General: Skin is warm and dry  Neurological:      General: No focal deficit present  Mental Status: She is alert and oriented to person, place, and time  Mental status is at baseline  Cranial Nerves: No cranial nerve deficit        Deep Tendon Reflexes: Reflexes normal    Psychiatric:         Mood and Affect: Mood normal          Behavior: Behavior normal

## 2022-08-03 DIAGNOSIS — I10 ESSENTIAL HYPERTENSION: ICD-10-CM

## 2022-08-03 RX ORDER — LOSARTAN POTASSIUM AND HYDROCHLOROTHIAZIDE 12.5; 1 MG/1; MG/1
1 TABLET ORAL DAILY
Qty: 90 TABLET | Refills: 2 | Status: SHIPPED | OUTPATIENT
Start: 2022-08-03

## 2022-08-31 ENCOUNTER — TELEPHONE (OUTPATIENT)
Dept: CARDIOLOGY CLINIC | Facility: CLINIC | Age: 67
End: 2022-08-31

## 2022-09-01 NOTE — TELEPHONE ENCOUNTER
She has mild grade 1 heart stiffening likely from age and htn- usually improved with exercise, tight bp control   Will discuss further on follow-uo

## 2022-09-15 ENCOUNTER — OFFICE VISIT (OUTPATIENT)
Dept: FAMILY MEDICINE CLINIC | Facility: CLINIC | Age: 67
End: 2022-09-15
Payer: COMMERCIAL

## 2022-09-15 VITALS
DIASTOLIC BLOOD PRESSURE: 82 MMHG | SYSTOLIC BLOOD PRESSURE: 134 MMHG | HEIGHT: 65 IN | HEART RATE: 66 BPM | RESPIRATION RATE: 16 BRPM | WEIGHT: 146 LBS | BODY MASS INDEX: 24.32 KG/M2 | TEMPERATURE: 96.8 F | OXYGEN SATURATION: 99 %

## 2022-09-15 DIAGNOSIS — K57.92 DIVERTICULITIS: ICD-10-CM

## 2022-09-15 DIAGNOSIS — F41.1 GENERALIZED ANXIETY DISORDER: ICD-10-CM

## 2022-09-15 DIAGNOSIS — R10.32 LEFT LOWER QUADRANT ABDOMINAL PAIN: Primary | ICD-10-CM

## 2022-09-15 DIAGNOSIS — K59.04 CHRONIC IDIOPATHIC CONSTIPATION: ICD-10-CM

## 2022-09-15 DIAGNOSIS — R14.0 BLOATING: ICD-10-CM

## 2022-09-15 PROCEDURE — 3725F SCREEN DEPRESSION PERFORMED: CPT | Performed by: FAMILY MEDICINE

## 2022-09-15 PROCEDURE — 99214 OFFICE O/P EST MOD 30 MIN: CPT | Performed by: FAMILY MEDICINE

## 2022-09-15 PROCEDURE — 1160F RVW MEDS BY RX/DR IN RCRD: CPT | Performed by: FAMILY MEDICINE

## 2022-09-15 PROCEDURE — 3075F SYST BP GE 130 - 139MM HG: CPT | Performed by: FAMILY MEDICINE

## 2022-09-15 PROCEDURE — 3079F DIAST BP 80-89 MM HG: CPT | Performed by: FAMILY MEDICINE

## 2022-09-15 NOTE — PROGRESS NOTES
Subjective:      Patient ID: Ramiro Alvarez is a 77 y o  female  Is here due to left groin pain  Patient is worried because she has a history of diverticulosis and diverticulitis  She denies any blood in stool, states that she takes daily MiraLax she was constipated prior to this and now has more loose stool full of gas  She does take Colace and Gas-X  She has no vaginal bleeding and is postmenopausal       Past Medical History:   Diagnosis Date    Anxiety     Diverticulitis of colon     Fatty liver 6/17/2021    Glaucoma     H/O cardiovascular stress test 08/16/2021    Dr Duarte Noriega Hypertension     Osteopenia        Family History   Problem Relation Age of Onset    Hyperlipidemia Mother     Hypertension Mother     Other Mother         cerebrovascular accident   Isha Aloe Stroke Mother     Lung cancer Father     No Known Problems Brother     No Known Problems Maternal Aunt     Breast cancer Paternal Aunt     No Known Problems Maternal Grandmother     Heart disease Maternal Grandfather     Early death Maternal Grandfather 45        massive MI-congenital defect?  No Known Problems Paternal Grandmother     No Known Problems Paternal Grandfather     No Known Problems Brother     Bone cancer Cousin     ADD / ADHD Neg Hx     Anesthesia problems Neg Hx     Cancer Neg Hx     Clotting disorder Neg Hx     Collagen disease Neg Hx     Diabetes Neg Hx     Dislocations Neg Hx     Learning disabilities Neg Hx     Neurological problems Neg Hx     Osteoporosis Neg Hx     Rheumatologic disease Neg Hx     Scoliosis Neg Hx     Vascular Disease Neg Hx        Past Surgical History:   Procedure Laterality Date    BREAST CYST EXCISION Right     benign - 2000 approx   COLONOSCOPY  2016    FOOT SURGERY Right     removal of cat tooth removed    MAMMO (HISTORICAL)  12/14/2018,11/10/17    TONSILLECTOMY      TOOTH EXTRACTION          reports that she has never smoked  She has never used smokeless tobacco  She reports previous alcohol use  She reports that she does not use drugs        Current Outpatient Medications:     ALPRAZolam (XANAX) 0 25 mg tablet, Take 1 tablet (0 25 mg total) by mouth daily at bedtime as needed for anxiety, Disp: 30 tablet, Rfl: 0    Ascorbic Acid (vitamin C) 1000 MG tablet, Take 1,000 mg by mouth every morning, Disp: , Rfl:     atorvastatin (LIPITOR) 10 mg tablet, Take 1 tablet (10 mg total) by mouth daily, Disp: 90 tablet, Rfl: 2    B Complex-C (B-complex with vitamin C) tablet, Take 1 tablet by mouth every morning, Disp: , Rfl:     Calcium Carb-Cholecalciferol (CALTRATE 600+D3 PO), Take by mouth every morning  , Disp: , Rfl:     carvedilol (COREG) 12 5 mg tablet, Take 1 tablet (12 5 mg total) by mouth 2 (two) times a day with meals, Disp: 180 tablet, Rfl: 3    Cholecalciferol (Vitamin D) 50 MCG (2000 UT) CAPS, Take by mouth every morning, Disp: , Rfl:     Glucosamine-Chondroitin-MSM (GLUCOSAMINE CHONDROIT MSM DS PO), Take by mouth every morning 2 tabs, Disp: , Rfl:     Lactobacillus-Inulin (CULTURELLE DIGESTIVE DAILY PO), Take by mouth every morning, Disp: , Rfl:     losartan-hydrochlorothiazide (HYZAAR) 100-12 5 MG per tablet, Take 1 tablet by mouth daily, Disp: 90 tablet, Rfl: 2    multivitamin (THERAGRAN) TABS, Take 1 tablet by mouth every morning, Disp: , Rfl:     timolol (BETIMOL) 0 5 % ophthalmic solution, Administer 1 drop to both eyes every morning, Disp: , Rfl:     TURMERIC CURCUMIN PO, Take 1,500 mg by mouth every morning Last dose 8/19/21, Disp: , Rfl:     timolol (TIMOPTIC) 0 5 % ophthalmic solution, instill 1 drop into both eyes every morning (Patient not taking: No sig reported), Disp: , Rfl:     The following portions of the patient's history were reviewed and updated as appropriate: allergies, current medications, past family history, past medical history, past social history, past surgical history and problem list     Review of Systems   Constitutional: Negative for fatigue and fever  HENT: Negative for congestion, facial swelling, mouth sores, rhinorrhea, sore throat and trouble swallowing  Eyes: Negative for pain and redness  Respiratory: Negative for cough, shortness of breath and wheezing  Cardiovascular: Negative for chest pain, palpitations and leg swelling  Gastrointestinal: Negative for abdominal pain, blood in stool, constipation, diarrhea and nausea  Genitourinary: Negative for dysuria, hematuria, menstrual problem, urgency and vaginal bleeding  Musculoskeletal: Negative for arthralgias, back pain and myalgias  Skin: Negative for rash and wound  Neurological: Negative for seizures, syncope and headaches  Hematological: Negative for adenopathy  Psychiatric/Behavioral: Negative for agitation and behavioral problems  PHQ-2/9 Depression Screening    Little interest or pleasure in doing things: 0 - not at all  Feeling down, depressed, or hopeless: 0 - not at all  PHQ-2 Score: 0  PHQ-2 Interpretation: Negative depression screen             Objective:    /82 (BP Location: Left arm, Patient Position: Sitting, Cuff Size: Standard)   Pulse 66   Temp (!) 96 8 °F (36 °C) (Temporal)   Resp 16   Ht 5' 5" (1 651 m)   Wt 66 2 kg (146 lb)   SpO2 99%   BMI 24 30 kg/m²      Physical Exam  Vitals and nursing note reviewed  Constitutional:       Appearance: Normal appearance  She is well-developed  She is not ill-appearing  HENT:      Head: Normocephalic and atraumatic  Right Ear: External ear normal       Left Ear: External ear normal       Nose: Nose normal       Mouth/Throat:      Mouth: Mucous membranes are moist       Pharynx: No oropharyngeal exudate or posterior oropharyngeal erythema  Eyes:      General: No scleral icterus  Right eye: No discharge  Left eye: No discharge        Conjunctiva/sclera: Conjunctivae normal    Cardiovascular:      Rate and Rhythm: Normal rate  Heart sounds: No murmur heard  No gallop  Pulmonary:      Effort: Pulmonary effort is normal  No respiratory distress  Breath sounds: Normal breath sounds  No stridor  No wheezing, rhonchi or rales  Abdominal:      General: There is no distension  Palpations: Abdomen is soft  There is no mass  Tenderness: There is no abdominal tenderness  There is no right CVA tenderness, guarding or rebound  Hernia: No hernia is present  Musculoskeletal:         General: No tenderness or deformity  Skin:     Findings: No erythema or rash  Neurological:      Mental Status: She is alert  Mental status is at baseline     Psychiatric:         Behavior: Behavior normal          Judgment: Judgment normal            Recent Results (from the past 8736 hour(s))   Comprehensive metabolic panel    Collection Time: 03/11/22  9:12 AM   Result Value Ref Range    Sodium 139 136 - 145 mmol/L    Potassium 4 0 3 5 - 5 3 mmol/L    Chloride 105 100 - 108 mmol/L    CO2 29 21 - 32 mmol/L    ANION GAP 5 4 - 13 mmol/L    BUN 28 (H) 5 - 25 mg/dL    Creatinine 0 80 0 60 - 1 30 mg/dL    Glucose, Fasting 105 (H) 65 - 99 mg/dL    Calcium 9 0 8 3 - 10 1 mg/dL    AST 25 5 - 45 U/L    ALT 41 12 - 78 U/L    Alkaline Phosphatase 46 46 - 116 U/L    Total Protein 7 4 6 4 - 8 2 g/dL    Albumin 3 8 3 5 - 5 0 g/dL    Total Bilirubin 0 45 0 20 - 1 00 mg/dL    eGFR 77 ml/min/1 73sq m   Lipid panel    Collection Time: 03/11/22  9:12 AM   Result Value Ref Range    Cholesterol 169 See Comment mg/dL    Triglycerides 57 See Comment mg/dL    HDL, Direct 67 >=50 mg/dL    LDL Calculated 91 0 - 100 mg/dL    Non-HDL-Chol (CHOL-HDL) 102 mg/dl   POCT hemoglobin A1c    Collection Time: 05/04/22  9:19 AM   Result Value Ref Range    Hemoglobin A1C 5 2 6 5   Vitamin D 25 hydroxy    Collection Time: 05/13/22 12:22 PM   Result Value Ref Range    Vit D, 25-Hydroxy 35 9 30 0 - 100 0 ng/mL   Echo complete w/ contrast if indicated Collection Time: 05/13/22 12:26 PM   Result Value Ref Range    LA size 2 9 cm    Triscuspid Valve Regurgitation Peak Gradient 24 0 mmHg    Tricuspid valve peak regurgitation velocity 2 46 m/s    LVPWd 0 90 cm    Left Atrium Area-systolic Apical Two Chamber 13 6 cm2    Left Atrium Area-systolic Four Chamber 66 1 cm2    MV E' Tissue Velocity Septal 7 cm/s    MV E' Tissue Velocity Lateral 9 cm/s    TR Peak Britton 2 5 m/s    IVSd 0 77 cm    LV DIASTOLIC VOLUME (MOD BIPLANE) 2D 77 mL    LEFT VENTRICLE SYSTOLIC VOLUME (MOD BIPLANE) 2D 36 mL    Left ventricular stroke volume (2D) 41 00 mL    A4C EF 58 %    LA length (A2C) 3 80 cm    LVIDd 4 20 cm    IVS 0 9 cm    LVIDS 3 00 cm    FS 29 28 - 44 %    Ao root 2 70 cm    RVID d 2 6 cm    AV LVOT peak gradient 6 mmHg    MV valve area p 1/2 method 4 89 cm2    PV peak gradient antegrade 4 mmHg    E wave deceleration time 157 ms    LVOT diameter 1 9 cm    AV peak gradient 7 mmHg    MV Peak E Britton 84 cm/s    MV Peak A Britton 0 9 m/s    FLORENCE A4C 15 cm2    RAA A4C 11 9 cm2    MV stenosis pressure 1/2 time 45 ms    LVSV, 2D 41 mL    LVOT area 2 83 cm2    LV EF 55        Laboratory Results: I have personally reviewed the pertinent laboratory results/reports     Radiology/Other Diagnostic Testing Results: I have personally reviewed pertinent reports  Echo complete w/ contrast if indicated    Result Date: 5/13/2022    Left Ventricle: Left ventricular cavity size is normal  Wall thickness is normal  The left ventricular ejection fraction is 55%  Systolic function is normal  Wall motion is normal  Diastolic function is mildly abnormal, consistent with grade I (abnormal) relaxation    The left ventricular wall motion is normal         Assessment/Plan:       Diagnoses and all orders for this visit:    Left lower quadrant abdominal pain  -     XR abdomen obstruction series;  Future    Generalized anxiety disorder    Bloating    Chronic idiopathic constipation    Diverticulitis      today's exam is benign  I have advised the patient if the symptoms do worsen she can obtain x-ray abdominal CTs  I would hold off the MiraLax insulin stool is loose at this time  If she does get constipated she will resume MiraLax  She will continue to use simethicone as needed  Follow-up if the symptoms worsen  I do not suspect acute diverticulitis or need for antibiotics at this time  I have reviewed patient's previous labs and imaging as well as a CT of the abdomend and pelvis which was negative for any acute conditions at that time  Ovaries were normal   I do not suspect any ovarian masses causing this and reassured the patient    Read package inserts for all medications before starting a new medications, call me if you have any questions  Patient was given opportunity to ask questions and all questions were answered  Portions of the record may have been created with voice recognition software  Occasional wrong word or "sound a like" substitutions may have occurred due to the inherent limitations of voice recognition software  Read the chart carefully and recognize, using context, where substitutions have occurred

## 2022-10-05 ENCOUNTER — OFFICE VISIT (OUTPATIENT)
Dept: FAMILY MEDICINE CLINIC | Facility: CLINIC | Age: 67
End: 2022-10-05
Payer: COMMERCIAL

## 2022-10-05 VITALS
WEIGHT: 151 LBS | OXYGEN SATURATION: 96 % | HEART RATE: 79 BPM | SYSTOLIC BLOOD PRESSURE: 138 MMHG | DIASTOLIC BLOOD PRESSURE: 84 MMHG | HEIGHT: 65 IN | BODY MASS INDEX: 25.16 KG/M2 | TEMPERATURE: 97.2 F

## 2022-10-05 DIAGNOSIS — S61.411A LACERATION OF RIGHT HAND WITHOUT FOREIGN BODY, INITIAL ENCOUNTER: Primary | ICD-10-CM

## 2022-10-05 PROCEDURE — 99213 OFFICE O/P EST LOW 20 MIN: CPT

## 2022-10-05 NOTE — PROGRESS NOTES
Name: Gerardo Henderson      : 1955      MRN: [id number]  Encounter Provider: ILENE Ac  Encounter Date: 10/5/2022   Encounter department: [address] [place] [address] MEDICINE    Assessment & Plan     1  Laceration of right hand without foreign body, initial encounter  Assessment & Plan:  Tetanus current, no s/s of infection leave YANDEL  Monitor and f/u for fever, drainage, swelling increased erythema  BMI Counseling: Body mass index is 25 13 kg/m²  The BMI is above normal  Nutrition recommendations include decreasing portion sizes  Exercise recommendations include moderate physical activity 150 minutes/week  Rationale for BMI follow-up plan is due to patient being overweight or obese  Depression Screening and Follow-up Plan: Patient was screened for depression during today's encounter  They screened negative with a PHQ-2 score of 0  Subjective      Cut her R hand last Monday states that she is current with her tetanus vaccine last received in   Wound is closed dry with mild erythema  She states it still hurts so was wondering if it was healing ok  Wound is on bony prominence of the r dorsal wrist no s/s of infection and good approximation of the wound  Advise to f/u for swelling, increased redness and pain  Review of Systems   Constitutional: Negative for chills and fever  HENT: Negative for congestion, trouble swallowing and voice change  Respiratory: Negative for chest tightness, shortness of breath and wheezing  Cardiovascular: Negative for chest pain, palpitations and leg swelling  Gastrointestinal: Negative for abdominal distention, abdominal pain, diarrhea and nausea  Skin: Positive for color change and wound  Neurological: Negative for dizziness, facial asymmetry, light-headedness and headaches  Psychiatric/Behavioral: Negative for agitation         Current Outpatient Medications on File Prior to Visit   Medication Sig    ALPRAZolam Iwonalinda Lo) 0 25 mg tablet Take 1 tablet (0 25 mg total) by mouth daily at bedtime as needed for anxiety    Ascorbic Acid (vitamin C) 1000 MG tablet Take 1,000 mg by mouth every morning    atorvastatin (LIPITOR) 10 mg tablet Take 1 tablet (10 mg total) by mouth daily    B Complex-C (B-complex with vitamin C) tablet Take 1 tablet by mouth every morning    Calcium Carb-Cholecalciferol (CALTRATE 600+D3 PO) Take by mouth every morning      carvedilol (COREG) 12 5 mg tablet Take 1 tablet (12 5 mg total) by mouth 2 (two) times a day with meals    Cholecalciferol (Vitamin D) 50 MCG (2000 UT) CAPS Take by mouth every morning    Glucosamine-Chondroitin-MSM (GLUCOSAMINE CHONDROIT MSM DS PO) Take by mouth every morning 2 tabs    Lactobacillus-Inulin (CULTURELLE DIGESTIVE DAILY PO) Take by mouth every morning    losartan-hydrochlorothiazide (HYZAAR) 100-12 5 MG per tablet Take 1 tablet by mouth daily    multivitamin (THERAGRAN) TABS Take 1 tablet by mouth every morning    timolol (BETIMOL) 0 5 % ophthalmic solution Administer 1 drop to both eyes every morning    TURMERIC CURCUMIN PO Take 1,500 mg by mouth every morning Last dose 8/19/21    timolol (TIMOPTIC) 0 5 % ophthalmic solution instill 1 drop into both eyes every morning (Patient not taking: No sig reported)       Objective     /84 (BP Location: Right arm, Patient Position: Sitting, Cuff Size: Standard)   Pulse 79   Temp (!) 97 2 °F (36 2 °C) (Temporal)   Ht 5' 5" (1 651 m)   Wt 68 5 kg (151 lb)   SpO2 96%   BMI 25 13 kg/m²     Physical Exam  Vitals and nursing note reviewed  Constitutional:       General: She is not in acute distress  Appearance: Normal appearance  She is not ill-appearing, toxic-appearing or diaphoretic  HENT:      Head: Normocephalic and atraumatic  Right Ear: Tympanic membrane normal       Left Ear: Tympanic membrane normal       Nose: Nose normal  No congestion or rhinorrhea        Mouth/Throat:      Mouth: Mucous membranes are moist       Pharynx: No oropharyngeal exudate or posterior oropharyngeal erythema  Pulmonary:      Effort: Pulmonary effort is normal  No respiratory distress  Breath sounds: Normal breath sounds  Abdominal:      General: Bowel sounds are normal    Skin:     General: Skin is warm  Capillary Refill: Capillary refill takes less than 2 seconds  Findings: Erythema and wound present  Comments: Clean dry, intact  No s/s of infection   Neurological:      General: No focal deficit present  Mental Status: She is alert         [id number] [facility] [place]

## 2022-10-05 NOTE — ASSESSMENT & PLAN NOTE
Tetanus current, no s/s of infection leave YANDEL  Monitor and f/u for fever, drainage, swelling increased erythema

## 2022-10-14 ENCOUNTER — OFFICE VISIT (OUTPATIENT)
Dept: CARDIOLOGY CLINIC | Facility: CLINIC | Age: 67
End: 2022-10-14
Payer: COMMERCIAL

## 2022-10-14 VITALS
OXYGEN SATURATION: 97 % | BODY MASS INDEX: 24.49 KG/M2 | SYSTOLIC BLOOD PRESSURE: 124 MMHG | WEIGHT: 147 LBS | HEIGHT: 65 IN | DIASTOLIC BLOOD PRESSURE: 70 MMHG | TEMPERATURE: 97.7 F | HEART RATE: 77 BPM

## 2022-10-14 DIAGNOSIS — I10 HTN (HYPERTENSION), MALIGNANT: Primary | ICD-10-CM

## 2022-10-14 DIAGNOSIS — E78.2 MIXED HYPERLIPIDEMIA: ICD-10-CM

## 2022-10-14 DIAGNOSIS — R60.0 BILATERAL LEG EDEMA: ICD-10-CM

## 2022-10-14 PROCEDURE — 93000 ELECTROCARDIOGRAM COMPLETE: CPT | Performed by: INTERNAL MEDICINE

## 2022-10-14 PROCEDURE — 99214 OFFICE O/P EST MOD 30 MIN: CPT | Performed by: INTERNAL MEDICINE

## 2022-10-14 RX ORDER — LORATADINE 10 MG/1
10 TABLET ORAL DAILY
COMMUNITY

## 2022-10-14 NOTE — PROGRESS NOTES
[facility]  [address] [place] [address] [address]   [place]  Cardiology Follow-up    Gerardo Henderson  [id number]  1955      1  HTN (hypertension), malignant  POCT ECG   2  Mixed hyperlipidemia  POCT ECG   3  Bilateral leg edema  POCT ECG      Discussion/Summary:   Exertional shortness of breath/malignant hypertension- improved  Swelling resolved Possible addition of low dose jardiance 10mg in addition to lorsartan-hctz  Hyperlipidemia/elevated LFTs- currently on atorvastatin 10 mg  Monitoring of her LFTs  Her last CT was negative for evidence for non alcoholic steatohepatitis  Family history for CVA- previous EKGs negative for atrial fibrillation  Previous Holter monitor was also negative  Varicose veins/swelling- compression sock    HPI:   80-year-old woman with family history for CVA, hypertension, hyperlipidemia presents with recent ER visit secondary to dizziness found to have hypertensive urgency  She states her blood pressures have been better since her medication regimen has been changed  She is trying to watch her sodium intake  She has previously had some exertional shortness of breath  She has also had some chest discomfort which she believes was a pulled muscle  She has been compliant with her medications  She denies having lower extremity swelling  She denies having fevers or chills  09/17/2021: We reviewed through her exercise stress test which showed no evidence of exercise-induced ischemia  Her blood pressure was controlled with exertion  She reports having some leg tightness  No overt edema  Plan to try compression  She has been compliant with medications  4/22/22: Blood pressure are elevated at times  Some dyspnea on exertion  No chest pain  Compliant with meds  10/14/2022:  She is improved blood pressure  Denies having major shortness of breath  Denies having chest heaviness        PMH  hld- atorvastatin  Htn- bp meds    Social Hx    Retired  [employer]  Not formal exercise  No smoking  Cut out beer  Sleep- no snoring, no daytime fatigue    Family Hx  Grandfather- MI  Mother- stroke/htn      Past Medical History:   Diagnosis Date   • Anxiety    • Diverticulitis of colon    • Fatty liver 2021   • Glaucoma    • H/O cardiovascular stress test 2021    Dr Aydin Burrell   • Hypercholesteremia    • Hypertension    • Osteopenia      Social History     Socioeconomic History   • Marital status: /Civil Union     Spouse name: Not on file   • Number of children: Not on file   • Years of education: Not on file   • Highest education level: Not on file   Occupational History   • Not on file   Tobacco Use   • Smoking status: Never Smoker   • Smokeless tobacco: Never Used   Vaping Use   • Vaping Use: Never used   Substance and Sexual Activity   • Alcohol use: Not Currently   • Drug use: Never   • Sexual activity: Yes     Partners: Male     Birth control/protection: Post-menopausal   Other Topics Concern   • Not on file   Social History Narrative    Tobacco smoking status:   Never smoker        Most recent tobacco use screenin2018          Alcohol intake:    Moderate    Per jackie     Social Determinants of Health     Financial Resource Strain: Not on file   Food Insecurity: Not on file   Transportation Needs: Not on file   Physical Activity: Not on file   Stress: Not on file   Social Connections: Not on file   Intimate Partner Violence: Not on file   Housing Stability: Not on file      Family History   Problem Relation Age of Onset   • Hyperlipidemia Mother    • Hypertension Mother    • Other Mother         cerebrovascular accident   • Stroke Mother    • Lung cancer Father    • No Known Problems Brother    • No Known Problems Maternal Aunt    • Breast cancer Paternal Aunt    • No Known Problems Maternal Grandmother    • Heart disease Maternal Grandfather    • Early death Maternal Grandfather 45        massive MI-congenital defect? • No Known Problems Paternal Grandmother    • No Known Problems Paternal Grandfather    • No Known Problems Brother    • Bone cancer Cousin    • ADD / ADHD Neg Hx    • Anesthesia problems Neg Hx    • Cancer Neg Hx    • Clotting disorder Neg Hx    • Collagen disease Neg Hx    • Diabetes Neg Hx    • Dislocations Neg Hx    • Learning disabilities Neg Hx    • Neurological problems Neg Hx    • Osteoporosis Neg Hx    • Rheumatologic disease Neg Hx    • Scoliosis Neg Hx    • Vascular Disease Neg Hx      Past Surgical History:   Procedure Laterality Date   • BREAST CYST EXCISION Right     benign - 2000 approx     • COLONOSCOPY  2016   • FOOT SURGERY Right     removal of cat tooth removed   • MAMMO (HISTORICAL)  12/14/2018,11/10/17   • TONSILLECTOMY     • TOOTH EXTRACTION         Current Outpatient Medications:   •  ALPRAZolam (XANAX) 0 25 mg tablet, Take 1 tablet (0 25 mg total) by mouth daily at bedtime as needed for anxiety, Disp: 30 tablet, Rfl: 0  •  Ascorbic Acid (vitamin C) 1000 MG tablet, Take 1,000 mg by mouth every morning, Disp: , Rfl:   •  atorvastatin (LIPITOR) 10 mg tablet, Take 1 tablet (10 mg total) by mouth daily, Disp: 90 tablet, Rfl: 2  •  B Complex-C (B-complex with vitamin C) tablet, Take 1 tablet by mouth every morning, Disp: , Rfl:   •  Calcium Carb-Cholecalciferol (CALTRATE 600+D3 PO), Take by mouth every morning  , Disp: , Rfl:   •  carvedilol (COREG) 12 5 mg tablet, Take 1 tablet (12 5 mg total) by mouth 2 (two) times a day with meals, Disp: 180 tablet, Rfl: 3  •  Cholecalciferol (Vitamin D) 50 MCG (2000 UT) CAPS, Take by mouth every morning, Disp: , Rfl:   •  Glucosamine-Chondroitin-MSM (GLUCOSAMINE CHONDROIT MSM DS PO), Take by mouth every morning 2 tabs, Disp: , Rfl:   •  Lactobacillus-Inulin (CULTURELLE DIGESTIVE DAILY PO), Take by mouth every morning, Disp: , Rfl:   •  loratadine (CLARITIN) 10 mg tablet, Take 10 mg by mouth daily, Disp: , Rfl:   • losartan-hydrochlorothiazide (HYZAAR) 100-12 5 MG per tablet, Take 1 tablet by mouth daily, Disp: 90 tablet, Rfl: 2  •  multivitamin (THERAGRAN) TABS, Take 1 tablet by mouth every morning, Disp: , Rfl:   •  timolol (BETIMOL) 0 5 % ophthalmic solution, Administer 1 drop to both eyes every morning, Disp: , Rfl:   •  timolol (TIMOPTIC) 0 5 % ophthalmic solution, instill 1 drop into both eyes every morning, Disp: , Rfl:   •  TURMERIC CURCUMIN PO, Take 1,500 mg by mouth every morning Last dose 8/19/21, Disp: , Rfl:   Allergies   Allergen Reactions   • Bee Venom Swelling     Vitals:    10/14/22 1338   BP: 124/70   BP Location: Right arm   Patient Position: Sitting   Cuff Size: Standard   Pulse: 77   Temp: 97 7 °F (36 5 °C)   SpO2: 97%   Weight: 66 7 kg (147 lb)   Height: 5' 5" (1 651 m)       Review of Systems:   Review of Systems   Constitutional: Negative  Negative for activity change, appetite change, chills, diaphoresis, fatigue, fever and unexpected weight change  HENT: Negative  Negative for congestion, dental problem, drooling, ear discharge, ear pain, facial swelling, hearing loss, mouth sores, nosebleeds, postnasal drip, rhinorrhea, sinus pressure, sinus pain, sneezing, sore throat, tinnitus, trouble swallowing and voice change  Eyes: Negative  Negative for photophobia, pain, redness, itching and visual disturbance  Respiratory: Negative for apnea, cough, choking, chest tightness, shortness of breath, wheezing and stridor  Cardiovascular: Negative  Negative for chest pain, palpitations and leg swelling  Gastrointestinal: Negative  Negative for abdominal distention, abdominal pain, anal bleeding, blood in stool, constipation, diarrhea, nausea, rectal pain and vomiting  Endocrine: Negative  Negative for cold intolerance, heat intolerance, polydipsia, polyphagia and polyuria  Genitourinary: Negative    Negative for decreased urine volume, difficulty urinating, dyspareunia, dysuria, enuresis, flank pain, frequency, genital sores, hematuria, menstrual problem, pelvic pain, urgency, vaginal bleeding, vaginal discharge and vaginal pain  Musculoskeletal: Negative  Negative for arthralgias, back pain, gait problem, joint swelling, myalgias, neck pain and neck stiffness  Skin: Negative  Negative for color change, pallor, rash and wound  Allergic/Immunologic: Negative  Negative for environmental allergies, food allergies and immunocompromised state  Neurological: Positive for dizziness  Negative for tremors, seizures, syncope, facial asymmetry, speech difficulty, weakness, light-headedness, numbness and headaches  Hematological: Negative  Negative for adenopathy  Does not bruise/bleed easily  Psychiatric/Behavioral: Negative  Negative for agitation, behavioral problems, confusion, decreased concentration, dysphoric mood, hallucinations, self-injury, sleep disturbance and suicidal ideas  The patient is not nervous/anxious and is not hyperactive  All other systems reviewed and are negative  Vitals:    10/14/22 1338   BP: 124/70   BP Location: Right arm   Patient Position: Sitting   Cuff Size: Standard   Pulse: 77   Temp: 97 7 °F (36 5 °C)   SpO2: 97%   Weight: 66 7 kg (147 lb)   Height: 5' 5" (1 651 m)     Physical Examination:   Physical Exam  Constitutional:       General: She is not in acute distress  Appearance: She is well-developed  She is not diaphoretic  HENT:      Head: Normocephalic and atraumatic  Right Ear: External ear normal       Left Ear: External ear normal    Eyes:      General: No scleral icterus  Right eye: No discharge  Left eye: No discharge  Conjunctiva/sclera: Conjunctivae normal       Pupils: Pupils are equal, round, and reactive to light  Neck:      Thyroid: No thyromegaly  Vascular: No JVD  Trachea: No tracheal deviation  Cardiovascular:      Rate and Rhythm: Normal rate and regular rhythm  Heart sounds:  No murmur heard  No friction rub  Gallop present  Pulmonary:      Effort: Pulmonary effort is normal  No respiratory distress  Breath sounds: Normal breath sounds  No stridor  No wheezing or rales  Chest:      Chest wall: No tenderness  Abdominal:      General: Bowel sounds are normal  There is no distension  Palpations: Abdomen is soft  There is no mass  Tenderness: There is no abdominal tenderness  There is no guarding or rebound  Musculoskeletal:         General: No tenderness or deformity  Normal range of motion  Cervical back: Normal range of motion and neck supple  Skin:     General: Skin is warm and dry  Coloration: Skin is not pale  Findings: No erythema or rash  Neurological:      Mental Status: She is alert and oriented to person, place, and time  Cranial Nerves: No cranial nerve deficit  Motor: No abnormal muscle tone  Coordination: Coordination normal       Deep Tendon Reflexes: Reflexes are normal and symmetric  Reflexes normal    Psychiatric:         Behavior: Behavior normal          Thought Content:  Thought content normal          Judgment: Judgment normal          Labs:     Lab Results   Component Value Date    WBC 6 02 07/22/2021    HGB 13 1 07/22/2021    HCT 40 4 07/22/2021    MCV 91 07/22/2021    RDW 12 6 07/22/2021     07/22/2021     BMP:  Lab Results   Component Value Date    SODIUM 139 03/11/2022    K 4 0 03/11/2022     03/11/2022    CO2 29 03/11/2022    BUN 28 (H) 03/11/2022    CREATININE 0 80 03/11/2022    GLUC 110 07/29/2021    GLUF 105 (H) 03/11/2022    CALCIUM 9 0 03/11/2022    EGFR 77 03/11/2022    MG 1 9 07/22/2021     LFT:  Lab Results   Component Value Date    AST 25 03/11/2022    ALT 41 03/11/2022    ALKPHOS 46 03/11/2022    TP 7 4 03/11/2022    ALB 3 8 03/11/2022      No results found for: [facility]  Lab Results   Component Value Date    HGBA1C 5 2 05/04/2022     Lipid Profile:   Lab Results   Component Value Date    CHOLESTEROL 169 03/11/2022    HDL 67 03/11/2022    LDLCALC 91 03/11/2022    TRIG 57 03/11/2022     Lab Results   Component Value Date    CHOLESTEROL 169 03/11/2022    CHOLESTEROL 159 10/30/2020     Lab Results   Component Value Date    TROPONINI <0 02 07/22/2021     Lab Results   Component Value Date    NTBNP 501 (H) 07/22/2021      No results found for this or any previous visit (from the past 672 hour(s))  Imaging & Testing   I have personally reviewed pertinent reports  Cardiac Testing     EKG: Personally reviewed  Reviewed previous EKG normal sinus rhythm no acute ST changes    Normal sinus rhythm at 77 beats per minute nonspecific T-wave changes unchanged from prior      Harrison Fraga MD [facility]  [phone]  Please call with any questions or suggestions    Counseling :  A description of the counseling:   Goals and Barriers:  Patient's ability to self care:  Medication side effect reviewed with patient in detail and all their questions answered  "Portions of the record may have been created with voice recognition software  Occasional wrong word or "sound a like" substitutions may have occurred due to the inherent limitations of voice recognition software  Read the chart carefully and recognize, using context, where substitutions have occurred   Please call if you have any questions  "

## 2022-10-26 ENCOUNTER — HOSPITAL ENCOUNTER (OUTPATIENT)
Dept: RADIOLOGY | Facility: HOSPITAL | Age: 67
Discharge: HOME | End: 2022-10-26
Payer: COMMERCIAL

## 2022-10-26 ENCOUNTER — OFFICE VISIT (OUTPATIENT)
Dept: FAMILY MEDICINE CLINIC | Facility: CLINIC | Age: 67
End: 2022-10-26
Payer: COMMERCIAL

## 2022-10-26 VITALS
RESPIRATION RATE: 16 BRPM | HEART RATE: 94 BPM | BODY MASS INDEX: 24.83 KG/M2 | SYSTOLIC BLOOD PRESSURE: 116 MMHG | OXYGEN SATURATION: 97 % | TEMPERATURE: 97.8 F | DIASTOLIC BLOOD PRESSURE: 68 MMHG | WEIGHT: 149 LBS | HEIGHT: 65 IN

## 2022-10-26 DIAGNOSIS — I10 ESSENTIAL HYPERTENSION: ICD-10-CM

## 2022-10-26 DIAGNOSIS — M25.562 CHRONIC PAIN OF LEFT KNEE: ICD-10-CM

## 2022-10-26 DIAGNOSIS — G89.29 CHRONIC PAIN OF LEFT KNEE: Primary | ICD-10-CM

## 2022-10-26 DIAGNOSIS — G89.29 CHRONIC PAIN OF LEFT KNEE: ICD-10-CM

## 2022-10-26 DIAGNOSIS — M25.562 CHRONIC PAIN OF LEFT KNEE: Primary | ICD-10-CM

## 2022-10-26 PROCEDURE — 73562 X-RAY EXAM OF KNEE 3: CPT

## 2022-10-26 PROCEDURE — 99214 OFFICE O/P EST MOD 30 MIN: CPT | Performed by: FAMILY MEDICINE

## 2022-10-26 RX ORDER — MELOXICAM 15 MG/1
15 TABLET ORAL DAILY
Qty: 20 TABLET | Refills: 0 | Status: SHIPPED | OUTPATIENT
Start: 2022-10-26

## 2022-10-26 NOTE — PROGRESS NOTES
Subjective:      Patient ID: Maria Elena Ivy is a 79 y o  female  Presents with left anterior knee pain and swelling for 1 week, she states previously she had knee pain and had negative ultrasound , she does have varicose veins in that leg and would like to r/o blood clots    Knee Pain         Past Medical History:   Diagnosis Date   • Anxiety    • Diverticulitis of colon    • Fatty liver 6/17/2021   • Glaucoma    • H/O cardiovascular stress test 08/16/2021    Dr Valadez Plane   • Hypercholesteremia    • Hypertension    • Osteopenia        Family History   Problem Relation Age of Onset   • Hyperlipidemia Mother    • Hypertension Mother    • Other Mother         cerebrovascular accident   • Stroke Mother    • Lung cancer Father    • No Known Problems Brother    • No Known Problems Maternal Aunt    • Breast cancer Paternal Aunt    • No Known Problems Maternal Grandmother    • Heart disease Maternal Grandfather    • Early death Maternal Grandfather 45        massive MI-congenital defect? • No Known Problems Paternal Grandmother    • No Known Problems Paternal Grandfather    • No Known Problems Brother    • Bone cancer Cousin    • ADD / ADHD Neg Hx    • Anesthesia problems Neg Hx    • Cancer Neg Hx    • Clotting disorder Neg Hx    • Collagen disease Neg Hx    • Diabetes Neg Hx    • Dislocations Neg Hx    • Learning disabilities Neg Hx    • Neurological problems Neg Hx    • Osteoporosis Neg Hx    • Rheumatologic disease Neg Hx    • Scoliosis Neg Hx    • Vascular Disease Neg Hx        Past Surgical History:   Procedure Laterality Date   • BREAST CYST EXCISION Right     benign - 2000 approx  • COLONOSCOPY  2016   • FOOT SURGERY Right     removal of cat tooth removed   • MAMMO (HISTORICAL)  12/14/2018,11/10/17   • TONSILLECTOMY     • TOOTH EXTRACTION          reports that she has never smoked  She has never used smokeless tobacco  She reports previous alcohol use   She reports that she does not use drugs       Current Outpatient Medications:   •  ALPRAZolam (XANAX) 0 25 mg tablet, Take 1 tablet (0 25 mg total) by mouth daily at bedtime as needed for anxiety, Disp: 30 tablet, Rfl: 0  •  Ascorbic Acid (vitamin C) 1000 MG tablet, Take 1,000 mg by mouth every morning, Disp: , Rfl:   •  atorvastatin (LIPITOR) 10 mg tablet, Take 1 tablet (10 mg total) by mouth daily, Disp: 90 tablet, Rfl: 2  •  B Complex-C (B-complex with vitamin C) tablet, Take 1 tablet by mouth every morning, Disp: , Rfl:   •  BIOTIN PO, Take by mouth, Disp: , Rfl:   •  Calcium Carb-Cholecalciferol (CALTRATE 600+D3 PO), Take by mouth every morning  , Disp: , Rfl:   •  carvedilol (COREG) 12 5 mg tablet, Take 1 tablet (12 5 mg total) by mouth 2 (two) times a day with meals, Disp: 180 tablet, Rfl: 3  •  Cholecalciferol (Vitamin D) 50 MCG (2000 UT) CAPS, Take by mouth every morning, Disp: , Rfl:   •  Glucosamine-Chondroitin-MSM (GLUCOSAMINE CHONDROIT MSM DS PO), Take by mouth every morning 2 tabs, Disp: , Rfl:   •  Lactobacillus-Inulin (CULTURELLE DIGESTIVE DAILY PO), Take by mouth every morning, Disp: , Rfl:   •  loratadine (CLARITIN) 10 mg tablet, Take 10 mg by mouth daily, Disp: , Rfl:   •  losartan-hydrochlorothiazide (HYZAAR) 100-12 5 MG per tablet, Take 1 tablet by mouth daily, Disp: 90 tablet, Rfl: 2  •  meloxicam (Mobic) 15 mg tablet, Take 1 tablet (15 mg total) by mouth daily, Disp: 20 tablet, Rfl: 0  •  multivitamin (THERAGRAN) TABS, Take 1 tablet by mouth every morning, Disp: , Rfl:   •  timolol (BETIMOL) 0 5 % ophthalmic solution, Administer 1 drop to both eyes every morning, Disp: , Rfl:   •  timolol (TIMOPTIC) 0 5 % ophthalmic solution, instill 1 drop into both eyes every morning, Disp: , Rfl:   •  TURMERIC CURCUMIN PO, Take 1,500 mg by mouth every morning Last dose 8/19/21 (Patient not taking: Reported on 10/26/2022), Disp: , Rfl:     The following portions of the patient's history were reviewed and updated as appropriate: allergies, current medications, past family history, past medical history, past social history, past surgical history and problem list     Review of Systems   Constitutional: Negative for fatigue and fever  HENT: Negative for congestion, facial swelling, mouth sores, rhinorrhea, sore throat and trouble swallowing  Eyes: Negative for pain and redness  Respiratory: Negative for cough, shortness of breath and wheezing  Cardiovascular: Negative for chest pain, palpitations and leg swelling  Gastrointestinal: Negative for abdominal pain, blood in stool, constipation, diarrhea and nausea  Genitourinary: Negative for dysuria, hematuria and urgency  Musculoskeletal: Positive for arthralgias  Negative for back pain and myalgias  Skin: Negative for rash and wound  Neurological: Negative for seizures, syncope and headaches  Hematological: Negative for adenopathy  Psychiatric/Behavioral: Negative for agitation and behavioral problems  PHQ-2/9 Depression Screening               Objective:    /68 (BP Location: Left arm, Patient Position: Sitting, Cuff Size: Adult)   Pulse 94   Temp 97 8 °F (36 6 °C) (Temporal)   Resp 16   Ht 5' 5" (1 651 m)   Wt 67 6 kg (149 lb)   SpO2 97%   BMI 24 79 kg/m²      Physical Exam  Vitals and nursing note reviewed  Constitutional:       Appearance: Normal appearance  She is well-developed  She is not ill-appearing  HENT:      Head: Normocephalic and atraumatic  Right Ear: External ear normal       Left Ear: External ear normal       Nose: Nose normal       Mouth/Throat:      Mouth: Mucous membranes are moist       Pharynx: No oropharyngeal exudate or posterior oropharyngeal erythema  Eyes:      General: No scleral icterus  Right eye: No discharge  Left eye: No discharge  Conjunctiva/sclera: Conjunctivae normal    Cardiovascular:      Rate and Rhythm: Normal rate  Heart sounds: No murmur heard  No gallop     Pulmonary:      Effort: Pulmonary effort is normal  No respiratory distress  Breath sounds: Normal breath sounds  No stridor  No wheezing, rhonchi or rales  Musculoskeletal:         General: Tenderness (medial border of anterior knee) present  No deformity  Right lower leg: No edema  Left lower leg: No edema  Comments: Reticular veins of left thigh area+   Skin:     Findings: No erythema or rash  Neurological:      Mental Status: She is alert  Laboratory Results: I have personally reviewed the pertinent laboratory results/reports   3/11/2022 and 5/4/2022  Lipid panel and cmp normal except mildly elevated fasing blood glucose, normal a1c    Assessment/Plan:  Problem List Items Addressed This Visit        Cardiovascular and Mediastinum    Essential hypertension      Other Visit Diagnoses     Chronic pain of left knee    -  Primary    Relevant Medications    meloxicam (Mobic) 15 mg tablet    Other Relevant Orders    XR knee 3 vw left non injury          Reassured that this is not a location for "blood clots" , low risk for DVT, recommend xray of knee, suspect osteoarthritis,  "RICE" advised  Start Ibuprofen 800 mg TID with meals for 5 days, tylenol q8h as needed  REST: Rest from aggravating activity  Avoid prolonged use of the affected arm   ICE: Ice applied for 15 minutes every 1 - 2 hours is helpful in reducing pain and spasm  Can use heat as well, if preferred   NSAIDs: Anti-inflammatory medication [unless prescribed by me] such as aspirin, advil, aleve, ibuprofen or naproxen sodium can help with pain  EARLY EXERCISE: Do not take otc if prescribed an Nsaid  Gentle exercise for mobility and stretching can help decrease the severity, duration and recurrence of your arm pain  Do not perform exercises that increase your pain  PREVENTION: Start muscle strengthening exercises after the pain has improved  She is aware , discussed meloxicam can elevate her blood pressure   Currently she is controlled, continue current medications, no change  Read package inserts for all medications before starting a new medications, call me if you have any questions  Patient was given opportunity to ask questions and all questions were answered  Disclaimer: Portions of the record may have been created with voice recognition software  Occasional wrong word or "sound a like" substitutions may have occurred due to the inherent limitations of voice recognition software  Read the chart carefully and recognize, using context, where substitutions have occurred  I have used the Epic copy/forward function to compose this note  I have reviewed my current note to ensure it reflects the current patient status, exam, assessment and plan

## 2022-10-27 DIAGNOSIS — E78.5 HYPERLIPIDEMIA, UNSPECIFIED HYPERLIPIDEMIA TYPE: ICD-10-CM

## 2022-10-27 RX ORDER — ATORVASTATIN CALCIUM 10 MG/1
TABLET, FILM COATED ORAL
Qty: 90 TABLET | Refills: 2 | Status: SHIPPED | OUTPATIENT
Start: 2022-10-27

## 2022-10-28 ENCOUNTER — TELEPHONE (OUTPATIENT)
Dept: FAMILY MEDICINE CLINIC | Facility: CLINIC | Age: 67
End: 2022-10-28

## 2022-10-28 NOTE — TELEPHONE ENCOUNTER
Patient is having symptoms consistent with a yeast infection, is willing to have a telephone visit with Brenda. Prefers to use the [facility] pharmacy on [facility]   Pt advised we do not have results yet

## 2022-12-04 PROBLEM — S61.411A LACERATION OF RIGHT HAND WITHOUT FOREIGN BODY: Status: RESOLVED | Noted: 2022-10-05 | Resolved: 2022-12-04

## 2023-01-05 ENCOUNTER — RA CDI HCC (OUTPATIENT)
Dept: OTHER | Facility: HOSPITAL | Age: 68
End: 2023-01-05

## 2023-01-05 NOTE — PROGRESS NOTES
[facility]  coding opportunities       Chart reviewed, no opportunity found:   [address]        Patients Insurance     Medicare Insurance: [facility]

## 2023-01-12 ENCOUNTER — OFFICE VISIT (OUTPATIENT)
Dept: FAMILY MEDICINE CLINIC | Facility: CLINIC | Age: 68
End: 2023-01-12

## 2023-01-12 VITALS
SYSTOLIC BLOOD PRESSURE: 128 MMHG | DIASTOLIC BLOOD PRESSURE: 82 MMHG | OXYGEN SATURATION: 98 % | HEIGHT: 65 IN | RESPIRATION RATE: 15 BRPM | TEMPERATURE: 98.1 F | HEART RATE: 73 BPM | BODY MASS INDEX: 25.16 KG/M2 | WEIGHT: 151 LBS

## 2023-01-12 DIAGNOSIS — F41.1 GENERALIZED ANXIETY DISORDER: ICD-10-CM

## 2023-01-12 DIAGNOSIS — E55.9 VITAMIN D DEFICIENCY: ICD-10-CM

## 2023-01-12 DIAGNOSIS — E78.5 DYSLIPIDEMIA: ICD-10-CM

## 2023-01-12 DIAGNOSIS — Z12.31 ENCOUNTER FOR SCREENING MAMMOGRAM FOR MALIGNANT NEOPLASM OF BREAST: ICD-10-CM

## 2023-01-12 DIAGNOSIS — I10 ESSENTIAL HYPERTENSION: Primary | ICD-10-CM

## 2023-01-12 RX ORDER — ZINC GLUCONATE 50 MG
50 TABLET ORAL DAILY
COMMUNITY

## 2023-01-12 NOTE — PROGRESS NOTES
Name: Óscar Meek      : 1955      MRN: [id number]  Encounter Provider: Audrey Spivey MD  Encounter Date: 2023   Encounter department: [address] [place] [address] MEDICINE    Assessment & Plan     1  Essential hypertension  -     Basic metabolic panel; Future    2  Dyslipidemia  -     Lipid panel; Future; Expected date: 2023    3  Vitamin D deficiency  Assessment & Plan:  Nl lab now cont otc      4  Generalized anxiety disorder  Assessment & Plan:  Occasional xanax      5  Encounter for screening mammogram for malignant neoplasm of breast  -     Mammo screening bilateral w 3d & cad; Future; Expected date: 2023           Subjective      HPI t here for interval visit HTn HL  Review of Systems   Respiratory: Negative for cough, chest tightness and shortness of breath  Cardiovascular: Negative for chest pain, palpitations and leg swelling  Neurological: Negative for dizziness, weakness, light-headedness and headaches  Psychiatric/Behavioral: Negative for dysphoric mood  The patient is not nervous/anxious          Current Outpatient Medications on File Prior to Visit   Medication Sig   • ALPRAZolam (XANAX) 0 25 mg tablet Take 1 tablet (0 25 mg total) by mouth daily at bedtime as needed for anxiety   • Ascorbic Acid (vitamin C) 1000 MG tablet Take 1,000 mg by mouth every morning   • atorvastatin (LIPITOR) 10 mg tablet TAKE 1 TABLET EVERY DAY   • B Complex-C (B-complex with vitamin C) tablet Take 1 tablet by mouth every morning   • BIOTIN PO Take by mouth   • Calcium Carb-Cholecalciferol (CALTRATE 600+D3 PO) Take by mouth every morning     • carvedilol (COREG) 12 5 mg tablet Take 1 tablet (12 5 mg total) by mouth 2 (two) times a day with meals   • Cholecalciferol (Vitamin D) 50 MCG ( UT) CAPS Take by mouth every morning   • Glucosamine-Chondroitin-MSM (GLUCOSAMINE CHONDROIT MSM DS PO) Take by mouth every morning 2 tabs   • Lactobacillus-Inulin (CULTURELLE DIGESTIVE DAILY PO) Take by mouth every morning   • loratadine (CLARITIN) 10 mg tablet Take 10 mg by mouth daily   • losartan-hydrochlorothiazide (HYZAAR) 100-12 5 MG per tablet Take 1 tablet by mouth daily   • multivitamin (THERAGRAN) TABS Take 1 tablet by mouth every morning   • timolol (BETIMOL) 0 5 % ophthalmic solution Administer 1 drop to both eyes every morning   • timolol (TIMOPTIC) 0 5 % ophthalmic solution instill 1 drop into both eyes every morning   • zinc gluconate 50 mg tablet Take 50 mg by mouth daily   • meloxicam (Mobic) 15 mg tablet Take 1 tablet (15 mg total) by mouth daily (Patient not taking: Reported on 1/12/2023)   • TURMERIC CURCUMIN PO Take 1,500 mg by mouth every morning Last dose 8/19/21 (Patient not taking: Reported on 10/26/2022)       Objective     /82 (BP Location: Left arm, Patient Position: Sitting, Cuff Size: Standard)   Pulse 73   Temp 98 1 °F (36 7 °C) (Tympanic)   Resp 15   Ht 5' 5" (1 651 m)   Wt 68 5 kg (151 lb)   SpO2 98%   BMI 25 13 kg/m²     Physical Exam  Constitutional:       General: She is not in acute distress  Appearance: Normal appearance  She is well-developed  She is obese  She is not ill-appearing  Eyes:      Extraocular Movements: Extraocular movements intact  Pupils: Pupils are equal, round, and reactive to light  Cardiovascular:      Rate and Rhythm: Normal rate and regular rhythm  Pulses: Normal pulses  Heart sounds: Normal heart sounds  No murmur heard  Pulmonary:      Effort: Pulmonary effort is normal       Breath sounds: Normal breath sounds  Chest:   Breasts:     Right: Normal       Left: Normal    Musculoskeletal:      Right lower leg: No edema  Left lower leg: No edema  Neurological:      General: No focal deficit present  Mental Status: She is alert and oriented to person, place, and time  Mental status is at baseline     Psychiatric:         Mood and Affect: Mood normal          Behavior: Behavior normal  Thought Content:  Thought content normal        Roly Massey MD

## 2023-03-08 DIAGNOSIS — I10 ESSENTIAL HYPERTENSION: ICD-10-CM

## 2023-03-08 RX ORDER — LOSARTAN POTASSIUM AND HYDROCHLOROTHIAZIDE 12.5; 1 MG/1; MG/1
1 TABLET ORAL DAILY
Qty: 90 TABLET | Refills: 3 | Status: SHIPPED | OUTPATIENT
Start: 2023-03-08

## 2023-03-22 DIAGNOSIS — I10 ESSENTIAL HYPERTENSION: ICD-10-CM

## 2023-03-22 RX ORDER — CARVEDILOL 12.5 MG/1
12.5 TABLET ORAL 2 TIMES DAILY WITH MEALS
Qty: 180 TABLET | Refills: 3 | Status: SHIPPED | OUTPATIENT
Start: 2023-03-22

## 2023-05-30 ENCOUNTER — TELEPHONE (OUTPATIENT)
Dept: CARDIOLOGY CLINIC | Facility: CLINIC | Age: 68
End: 2023-05-30

## 2023-05-31 NOTE — TELEPHONE ENCOUNTER
I left a message for patient to return call so that I could make her aware that her insurance would know that information  And that she reach out to them for further instruction

## 2023-06-02 ENCOUNTER — APPOINTMENT (OUTPATIENT)
Dept: LAB | Facility: CLINIC | Age: 68
End: 2023-06-02
Payer: COMMERCIAL

## 2023-06-02 DIAGNOSIS — E78.5 DYSLIPIDEMIA: ICD-10-CM

## 2023-06-02 DIAGNOSIS — I10 HTN (HYPERTENSION), MALIGNANT: ICD-10-CM

## 2023-06-02 DIAGNOSIS — I10 ESSENTIAL HYPERTENSION: ICD-10-CM

## 2023-06-02 DIAGNOSIS — E78.2 MIXED HYPERLIPIDEMIA: ICD-10-CM

## 2023-06-02 DIAGNOSIS — R60.0 BILATERAL LEG EDEMA: ICD-10-CM

## 2023-06-02 LAB
ANION GAP SERPL CALCULATED.3IONS-SCNC: 1 MMOL/L (ref 4–13)
BNP SERPL-MCNC: 64 PG/ML (ref 0–100)
BUN SERPL-MCNC: 15 MG/DL (ref 5–25)
CALCIUM SERPL-MCNC: 9.3 MG/DL (ref 8.3–10.1)
CHLORIDE SERPL-SCNC: 106 MMOL/L (ref 96–108)
CHOLEST SERPL-MCNC: 164 MG/DL
CO2 SERPL-SCNC: 29 MMOL/L (ref 21–32)
CREAT SERPL-MCNC: 0.65 MG/DL (ref 0.6–1.3)
GFR SERPL CREATININE-BSD FRML MDRD: 92 ML/MIN/1.73SQ M
GLUCOSE P FAST SERPL-MCNC: 117 MG/DL (ref 65–99)
HDLC SERPL-MCNC: 87 MG/DL
LDLC SERPL CALC-MCNC: 64 MG/DL (ref 0–100)
NONHDLC SERPL-MCNC: 77 MG/DL
POTASSIUM SERPL-SCNC: 3.6 MMOL/L (ref 3.5–5.3)
SODIUM SERPL-SCNC: 136 MMOL/L (ref 135–147)
TRIGL SERPL-MCNC: 63 MG/DL

## 2023-06-02 PROCEDURE — 80061 LIPID PANEL: CPT

## 2023-06-02 PROCEDURE — 83704 LIPOPROTEIN BLD QUAN PART: CPT

## 2023-06-02 PROCEDURE — 36415 COLL VENOUS BLD VENIPUNCTURE: CPT

## 2023-06-02 PROCEDURE — 83880 ASSAY OF NATRIURETIC PEPTIDE: CPT

## 2023-06-02 PROCEDURE — 80048 BASIC METABOLIC PNL TOTAL CA: CPT

## 2023-06-05 ENCOUNTER — HOSPITAL ENCOUNTER (EMERGENCY)
Facility: HOSPITAL | Age: 68
Discharge: HOME | End: 2023-06-05
Attending: EMERGENCY MEDICINE
Payer: COMMERCIAL

## 2023-06-05 ENCOUNTER — TELEPHONE (OUTPATIENT)
Dept: FAMILY MEDICINE CLINIC | Facility: CLINIC | Age: 68
End: 2023-06-05

## 2023-06-05 VITALS
OXYGEN SATURATION: 96 % | DIASTOLIC BLOOD PRESSURE: 91 MMHG | SYSTOLIC BLOOD PRESSURE: 168 MMHG | HEART RATE: 67 BPM | HEIGHT: 65 IN | TEMPERATURE: 97.3 F | RESPIRATION RATE: 18 BRPM | BODY MASS INDEX: 25.16 KG/M2 | WEIGHT: 151 LBS

## 2023-06-05 DIAGNOSIS — R73.09 ABNORMAL GLUCOSE: Primary | ICD-10-CM

## 2023-06-05 DIAGNOSIS — Z20.3 NEED FOR POST EXPOSURE PROPHYLAXIS FOR RABIES: Primary | ICD-10-CM

## 2023-06-05 PROCEDURE — 90675 RABIES VACCINE IM: CPT | Performed by: EMERGENCY MEDICINE

## 2023-06-05 PROCEDURE — 99283 EMERGENCY DEPT VISIT LOW MDM: CPT

## 2023-06-05 PROCEDURE — 90471 IMMUNIZATION ADMIN: CPT

## 2023-06-05 RX ADMIN — Medication 1 ML: at 17:02

## 2023-06-05 NOTE — TELEPHONE ENCOUNTER
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

## 2023-06-06 LAB
CHOLEST SERPL-MCNC: 168 MG/DL (ref 100–199)
HDL SERPL-SCNC: 39.7 UMOL/L
HDLC SERPL-MCNC: 84 MG/DL
LDL SERPL QN: 21.1 NM
LDL SERPL QN: <90 NMOL/L
LDL SERPL-SCNC: 755 NMOL/L
LDLC SERPL CALC-MCNC: 70 MG/DL (ref 0–99)
LP-IR SCORE SERPL: 29
TRIGL SERPL-MCNC: 76 MG/DL (ref 0–149)

## 2023-06-06 NOTE — ED PROVIDER NOTES
History  Chief Complaint   Patient presents with   • Cat Bite     Left hand cat bite/scratch from a stray that did not break the skin and was told to come here for a rabies booster shot     Presents for evaluation for left hand cat bite/scratch  She was playing with straight kitten and was scratched/bitten on the left hand does not believe it broke the skin after talking the vet thought that she could should get the prophylaxis for rabies  Incident occurred yesterday  Patient had previous postexposure prophylaxis in 2014  History provided by:  Patient   used: No    Cat Bite      Prior to Admission Medications   Prescriptions Last Dose Informant Patient Reported? Taking?    ALPRAZolam (XANAX) 0 25 mg tablet  Self No No   Sig: Take 1 tablet (0 25 mg total) by mouth daily at bedtime as needed for anxiety   Ascorbic Acid (vitamin C) 1000 MG tablet  Self Yes No   Sig: Take 1,000 mg by mouth every morning   B Complex-C (B-complex with vitamin C) tablet  Self Yes No   Sig: Take 1 tablet by mouth every morning   BIOTIN PO   Yes No   Sig: Take by mouth   Calcium Carb-Cholecalciferol (CALTRATE 600+D3 PO)  Self Yes No   Sig: Take by mouth every morning     Cholecalciferol (Vitamin D) 50 MCG (2000 UT) CAPS  Self Yes No   Sig: Take by mouth every morning   Glucosamine-Chondroitin-MSM (GLUCOSAMINE CHONDROIT MSM DS PO)  Self Yes No   Sig: Take by mouth every morning 2 tabs   Lactobacillus-Inulin (CULTURELLE DIGESTIVE DAILY PO)  Self Yes No   Sig: Take by mouth every morning   TURMERIC CURCUMIN PO  Self Yes No   Sig: Take 1,500 mg by mouth every morning Last dose 8/19/21   Patient not taking: Reported on 10/26/2022   atorvastatin (LIPITOR) 10 mg tablet   No No   Sig: TAKE 1 TABLET EVERY DAY   carvedilol (COREG) 12 5 mg tablet   No No   Sig: TAKE 1 TABLET (12 5 MG TOTAL) BY MOUTH 2 (TWO) TIMES A DAY WITH MEALS   loratadine (CLARITIN) 10 mg tablet  Self Yes No   Sig: Take 10 mg by mouth daily losartan-hydrochlorothiazide (HYZAAR) 100-12 5 MG per tablet   No No   Sig: Take 1 tablet by mouth daily   meloxicam (Mobic) 15 mg tablet   No No   Sig: Take 1 tablet (15 mg total) by mouth daily   Patient not taking: Reported on 1/12/2023   multivitamin (THERAGRAN) TABS  Self Yes No   Sig: Take 1 tablet by mouth every morning   timolol (BETIMOL) 0 5 % ophthalmic solution  Self Yes No   Sig: Administer 1 drop to both eyes every morning   timolol (TIMOPTIC) 0 5 % ophthalmic solution  Self Yes No   Sig: instill 1 drop into both eyes every morning   zinc gluconate 50 mg tablet   Yes No   Sig: Take 50 mg by mouth daily      Facility-Administered Medications: None       Past Medical History:   Diagnosis Date   • Anxiety    • Diverticulitis of colon    • Fatty liver 6/17/2021   • Glaucoma    • H/O cardiovascular stress test 08/16/2021    Dr Corby Morales   • Hypercholesteremia    • Hypertension    • Osteopenia        Past Surgical History:   Procedure Laterality Date   • BREAST CYST EXCISION Right     benign - 2000 approx  • COLONOSCOPY  2016   • FOOT SURGERY Right     removal of cat tooth removed   • MAMMO (HISTORICAL)  12/14/2018,11/10/17   • TONSILLECTOMY     • TOOTH EXTRACTION         Family History   Problem Relation Age of Onset   • Hyperlipidemia Mother    • Hypertension Mother    • Other Mother         cerebrovascular accident   • Stroke Mother    • Lung cancer Father 47   • No Known Problems Maternal Grandmother    • Heart disease Maternal Grandfather    • Early death Maternal Grandfather 45        massive MI-congenital defect?    • No Known Problems Paternal Grandmother    • No Known Problems Paternal Grandfather    • No Known Problems Brother    • No Known Problems Brother    • No Known Problems Maternal Aunt    • No Known Problems Maternal Aunt    • Breast cancer Paternal Aunt    • Bone cancer Cousin    • ADD / ADHD Neg Hx    • Anesthesia problems Neg Hx    • Cancer Neg Hx    • Clotting disorder Neg Hx    • Collagen disease Neg Hx    • Diabetes Neg Hx    • Dislocations Neg Hx    • Learning disabilities Neg Hx    • Neurological problems Neg Hx    • Osteoporosis Neg Hx    • Rheumatologic disease Neg Hx    • Scoliosis Neg Hx    • Vascular Disease Neg Hx      I have reviewed and agree with the history as documented  E-Cigarette/Vaping   • E-Cigarette Use Never User      E-Cigarette/Vaping Substances   • Nicotine No    • THC No    • CBD No    • Flavoring No    • Other No    • Unknown No      Social History     Tobacco Use   • Smoking status: Never   • Smokeless tobacco: Never   Vaping Use   • Vaping Use: Never used   Substance Use Topics   • Alcohol use: Not Currently   • Drug use: Never       Review of Systems   All other systems reviewed and are negative  Physical Exam  Physical Exam  Vitals and nursing note reviewed  Constitutional:       General: She is not in acute distress  Cardiovascular:      Rate and Rhythm: Normal rate and regular rhythm  Pulmonary:      Effort: Pulmonary effort is normal  No respiratory distress  Musculoskeletal:         General: No deformity  Normal range of motion  Skin:     Capillary Refill: Capillary refill takes less than 2 seconds  Findings: No rash  Neurological:      General: No focal deficit present  Mental Status: She is alert and oriented to person, place, and time           Vital Signs  ED Triage Vitals [06/05/23 1449]   Temperature Pulse Respirations Blood Pressure SpO2   (!) 97 3 °F (36 3 °C) 67 18 168/91 96 %      Temp src Heart Rate Source Patient Position - Orthostatic VS BP Location FiO2 (%)   -- -- -- -- --      Pain Score       No Pain           Vitals:    06/05/23 1449   BP: 168/91   Pulse: 67         Visual Acuity      ED Medications  Medications   rabies vaccine, human diploid IM injection 1 mL (1 mL Intramuscular Given 6/5/23 1702)       Diagnostic Studies  Results Reviewed     None                 No orders to display Procedures  Procedures         ED Course                               SBIRT 20yo+    Flowsheet Row Most Recent Value   Initial Alcohol Screen: US AUDIT-C     1  How often do you have a drink containing alcohol? 0 Filed at: 06/05/2023 1449   2  How many drinks containing alcohol do you have on a typical day you are drinking? 0 Filed at: 06/05/2023 1449   3a  Male UNDER 65: How often do you have five or more drinks on one occasion? 0 Filed at: 06/05/2023 1449   3b  FEMALE Any Age, or MALE 65+: How often do you have 4 or more drinks on one occassion? 0 Filed at: 06/05/2023 1449   Audit-C Score 0 Filed at: 06/05/2023 1449   MARY: How many times in the past year have you    Used an illegal drug or used a prescription medication for non-medical reasons? Never Filed at: 06/05/2023 1449                    Medical Decision Making  Pulse ox 96% on room air indicating adequate oxygenation  Since patient has previous postexposure prophylaxis series she only needs the vaccine on day 0 and day 3  Need for post exposure prophylaxis for rabies: acute illness or injury  Risk  Prescription drug management  Disposition  Final diagnoses:   Need for post exposure prophylaxis for rabies     Time reflects when diagnosis was documented in both MDM as applicable and the Disposition within this note     Time User Action Codes Description Comment    6/5/2023  4:56 PM Jose Manuel Crespo Add [Z20 3] Need for post exposure prophylaxis for rabies       ED Disposition     ED Disposition   Discharge    Condition   Stable    Date/Time   Mon Jun 5, 2023  4:56 PM    Comment   Christi Smith discharge to home/self care                 Follow-up Information     Follow up With Specialties Details Why Contact Info Additional [address] Emergency Department Emergency Medicine On 6/8/2023 for repeat vaccine [facility]  [address] [facility] [address] [id number] [address] [facility] [id number]          Discharge Medication List as of 6/5/2023  4:56 PM      CONTINUE these medications which have NOT CHANGED    Details   ALPRAZolam (XANAX) 0 25 mg tablet Take 1 tablet (0 25 mg total) by mouth daily at bedtime as needed for anxiety, Starting Thu 2/17/2022, Normal      Ascorbic Acid (vitamin C) 1000 MG tablet Take 1,000 mg by mouth every morning, Historical Med      atorvastatin (LIPITOR) 10 mg tablet TAKE 1 TABLET EVERY DAY, Normal      B Complex-C (B-complex with vitamin C) tablet Take 1 tablet by mouth every morning, Historical Med      BIOTIN PO Take by mouth, Historical Med      Calcium Carb-Cholecalciferol (CALTRATE 600+D3 PO) Take by mouth every morning  , Historical Med      carvedilol (COREG) 12 5 mg tablet TAKE 1 TABLET (12 5 MG TOTAL) BY MOUTH 2 (TWO) TIMES A DAY WITH MEALS, Starting Wed 3/22/2023, Normal      Cholecalciferol (Vitamin D) 50 MCG (2000 UT) CAPS Take by mouth every morning, Historical Med      Glucosamine-Chondroitin-MSM (GLUCOSAMINE CHONDROIT MSM DS PO) Take by mouth every morning 2 tabs, Historical Med      Lactobacillus-Inulin (CULTURELLE DIGESTIVE DAILY PO) Take by mouth every morning, Historical Med      loratadine (CLARITIN) 10 mg tablet Take 10 mg by mouth daily, Historical Med      losartan-hydrochlorothiazide (HYZAAR) 100-12 5 MG per tablet Take 1 tablet by mouth daily, Starting Wed 3/8/2023, Normal      meloxicam (Mobic) 15 mg tablet Take 1 tablet (15 mg total) by mouth daily, Starting Wed 10/26/2022, Normal      multivitamin (THERAGRAN) TABS Take 1 tablet by mouth every morning, Historical Med      timolol (BETIMOL) 0 5 % ophthalmic solution Administer 1 drop to both eyes every morning, Historical Med      timolol (TIMOPTIC) 0 5 % ophthalmic solution instill 1 drop into both eyes every morning, Historical Med      TURMERIC CURCUMIN PO Take 1,500 mg by mouth every morning Last dose 8/19/21, Historical Med zinc gluconate 50 mg tablet Take 50 mg by mouth daily, Historical Med             No discharge procedures on file      PDMP Review       Value Time User    PDMP Reviewed  Yes 2/17/2022  3:06 PM Aline Alaniz MD          ED Provider  Electronically Signed by           Brianna Valadez DO  06/06/23 1523

## 2023-06-08 ENCOUNTER — HOSPITAL ENCOUNTER (EMERGENCY)
Facility: HOSPITAL | Age: 68
Discharge: HOME | End: 2023-06-08
Attending: EMERGENCY MEDICINE
Payer: COMMERCIAL

## 2023-06-08 VITALS
OXYGEN SATURATION: 97 % | RESPIRATION RATE: 16 BRPM | DIASTOLIC BLOOD PRESSURE: 66 MMHG | HEART RATE: 68 BPM | SYSTOLIC BLOOD PRESSURE: 135 MMHG | TEMPERATURE: 97.4 F

## 2023-06-08 DIAGNOSIS — Z23 ENCOUNTER FOR REPEAT ADMINISTRATION OF RABIES VACCINATION: Primary | ICD-10-CM

## 2023-06-08 PROCEDURE — 90471 IMMUNIZATION ADMIN: CPT

## 2023-06-08 PROCEDURE — 90675 RABIES VACCINE IM: CPT | Performed by: EMERGENCY MEDICINE

## 2023-06-08 RX ADMIN — RABIES VIRUS STRAIN PM-1503-3M ANTIGEN (PROPIOLACTONE INACTIVATED) AND WATER 1 ML: KIT at 13:33

## 2023-06-08 NOTE — ED PROVIDER NOTES
History  Chief Complaint   Patient presents with   • Follow Up Rabies     Here for 2nd rabies  Was here on Monday for cat scratch/bite     Patient presents for second dose of postexposure rabies prophylaxis vaccine  Patient denied any reaction to the first dose of vaccine  No other complaints at this time  History provided by:  Patient   used: No        Prior to Admission Medications   Prescriptions Last Dose Informant Patient Reported? Taking?    ALPRAZolam (XANAX) 0 25 mg tablet  Self No No   Sig: Take 1 tablet (0 25 mg total) by mouth daily at bedtime as needed for anxiety   Ascorbic Acid (vitamin C) 1000 MG tablet  Self Yes No   Sig: Take 1,000 mg by mouth every morning   B Complex-C (B-complex with vitamin C) tablet  Self Yes No   Sig: Take 1 tablet by mouth every morning   BIOTIN PO   Yes No   Sig: Take by mouth   Calcium Carb-Cholecalciferol (CALTRATE 600+D3 PO)  Self Yes No   Sig: Take by mouth every morning     Cholecalciferol (Vitamin D) 50 MCG (2000 UT) CAPS  Self Yes No   Sig: Take by mouth every morning   Glucosamine-Chondroitin-MSM (GLUCOSAMINE CHONDROIT MSM DS PO)  Self Yes No   Sig: Take by mouth every morning 2 tabs   Lactobacillus-Inulin (CULTURELLE DIGESTIVE DAILY PO)  Self Yes No   Sig: Take by mouth every morning   TURMERIC CURCUMIN PO  Self Yes No   Sig: Take 1,500 mg by mouth every morning Last dose 8/19/21   Patient not taking: Reported on 10/26/2022   atorvastatin (LIPITOR) 10 mg tablet   No No   Sig: TAKE 1 TABLET EVERY DAY   carvedilol (COREG) 12 5 mg tablet   No No   Sig: TAKE 1 TABLET (12 5 MG TOTAL) BY MOUTH 2 (TWO) TIMES A DAY WITH MEALS   loratadine (CLARITIN) 10 mg tablet  Self Yes No   Sig: Take 10 mg by mouth daily   losartan-hydrochlorothiazide (HYZAAR) 100-12 5 MG per tablet   No No   Sig: Take 1 tablet by mouth daily   meloxicam (Mobic) 15 mg tablet   No No   Sig: Take 1 tablet (15 mg total) by mouth daily   Patient not taking: Reported on 1/12/2023 multivitamin (THERAGRAN) TABS  Self Yes No   Sig: Take 1 tablet by mouth every morning   timolol (BETIMOL) 0 5 % ophthalmic solution  Self Yes No   Sig: Administer 1 drop to both eyes every morning   timolol (TIMOPTIC) 0 5 % ophthalmic solution  Self Yes No   Sig: instill 1 drop into both eyes every morning   zinc gluconate 50 mg tablet   Yes No   Sig: Take 50 mg by mouth daily      Facility-Administered Medications: None       Past Medical History:   Diagnosis Date   • Anxiety    • Diverticulitis of colon    • Fatty liver 6/17/2021   • Glaucoma    • H/O cardiovascular stress test 08/16/2021    Dr Jaylin Mckeon   • Hypercholesteremia    • Hypertension    • Osteopenia        Past Surgical History:   Procedure Laterality Date   • BREAST CYST EXCISION Right     benign - 2000 approx  • COLONOSCOPY  2016   • FOOT SURGERY Right     removal of cat tooth removed   • MAMMO (HISTORICAL)  12/14/2018,11/10/17   • TONSILLECTOMY     • TOOTH EXTRACTION         Family History   Problem Relation Age of Onset   • Hyperlipidemia Mother    • Hypertension Mother    • Other Mother         cerebrovascular accident   • Stroke Mother    • Lung cancer Father 47   • No Known Problems Maternal Grandmother    • Heart disease Maternal Grandfather    • Early death Maternal Grandfather 45        massive MI-congenital defect?    • No Known Problems Paternal Grandmother    • No Known Problems Paternal Grandfather    • No Known Problems Brother    • No Known Problems Brother    • No Known Problems Maternal Aunt    • No Known Problems Maternal Aunt    • Breast cancer Paternal Aunt    • Bone cancer Cousin    • ADD / ADHD Neg Hx    • Anesthesia problems Neg Hx    • Cancer Neg Hx    • Clotting disorder Neg Hx    • Collagen disease Neg Hx    • Diabetes Neg Hx    • Dislocations Neg Hx    • Learning disabilities Neg Hx    • Neurological problems Neg Hx    • Osteoporosis Neg Hx    • Rheumatologic disease Neg Hx    • Scoliosis Neg Hx    • Vascular Disease Neg Hx I have reviewed and agree with the history as documented  E-Cigarette/Vaping   • E-Cigarette Use Never User      E-Cigarette/Vaping Substances   • Nicotine No    • THC No    • CBD No    • Flavoring No    • Other No    • Unknown No      Social History     Tobacco Use   • Smoking status: Never   • Smokeless tobacco: Never   Vaping Use   • Vaping Use: Never used   Substance Use Topics   • Alcohol use: Not Currently   • Drug use: Never       Review of Systems   All other systems reviewed and are negative  Physical Exam  Physical Exam  Vitals and nursing note reviewed  Constitutional:       General: She is not in acute distress  Cardiovascular:      Rate and Rhythm: Normal rate and regular rhythm  Pulmonary:      Effort: Pulmonary effort is normal  No respiratory distress  Breath sounds: Normal breath sounds  Neurological:      General: No focal deficit present  Mental Status: She is alert and oriented to person, place, and time  Vital Signs  ED Triage Vitals [06/08/23 1318]   Temperature Pulse Respirations Blood Pressure SpO2   (!) 97 4 °F (36 3 °C) 68 16 135/66 97 %      Temp Source Heart Rate Source Patient Position - Orthostatic VS BP Location FiO2 (%)   Temporal Monitor Sitting Right arm --      Pain Score       --           Vitals:    06/08/23 1318   BP: 135/66   Pulse: 68   Patient Position - Orthostatic VS: Sitting         Visual Acuity      ED Medications  Medications   rabies vaccine, human diploid IM injection 1 mL (1 mL Intramuscular Given 6/8/23 1333)       Diagnostic Studies  Results Reviewed     None                 No orders to display              Procedures  Procedures         ED Course                               SBIRT 20yo+    Flowsheet Row Most Recent Value   Initial Alcohol Screen: US AUDIT-C     1  How often do you have a drink containing alcohol? 0 Filed at: 06/08/2023 1320   2   How many drinks containing alcohol do you have on a typical day you are drinking? 0 Filed at: 06/08/2023 1320   3b  FEMALE Any Age, or MALE 65+: How often do you have 4 or more drinks on one occassion? 0 Filed at: 06/08/2023 1320   Audit-C Score 0 Filed at: 06/08/2023 1320   MARY: How many times in the past year have you    Used an illegal drug or used a prescription medication for non-medical reasons? Never Filed at: 06/08/2023 1320                    Medical Decision Making  Pulse ox 97% on room air indicating adequate oxygenation  Encounter for repeat administration of rabies vaccination: acute illness or injury  Risk  Prescription drug management  Disposition  Final diagnoses:   Encounter for repeat administration of rabies vaccination     Time reflects when diagnosis was documented in both MDM as applicable and the Disposition within this note     Time User Action Codes Description Comment    6/8/2023  1:22 PM Ann Marie Crespo [place] [address],[address] Encounter for repeat administration of rabies vaccination       ED Disposition     ED Disposition   Discharge    Condition   Stable    Date/Time   Thu Jun 8, 2023  1:22 PM    Comment   Elizabeth Queen discharge to home/self care                 Follow-up Information     Follow up With Specialties Details Why Contact Info    Nisha Butts MD Family Medicine  As needed [address]  [address] [place] [address]  [phone]            Discharge Medication List as of 6/8/2023  1:23 PM      CONTINUE these medications which have NOT CHANGED    Details   ALPRAZolam (XANAX) 0 25 mg tablet Take 1 tablet (0 25 mg total) by mouth daily at bedtime as needed for anxiety, Starting u 2/17/2022, Normal      Ascorbic Acid (vitamin C) 1000 MG tablet Take 1,000 mg by mouth every morning, Historical Med      atorvastatin (LIPITOR) 10 mg tablet TAKE 1 TABLET EVERY DAY, Normal      B Complex-C (B-complex with vitamin C) tablet Take 1 tablet by mouth every morning, Historical Med      BIOTIN PO Take by mouth, Historical Med      Calcium Carb-Cholecalciferol (CALTRATE 600+D3 PO) Take by mouth every morning  , Historical Med      carvedilol (COREG) 12 5 mg tablet TAKE 1 TABLET (12 5 MG TOTAL) BY MOUTH 2 (TWO) TIMES A DAY WITH MEALS, Starting Wed 3/22/2023, Normal      Cholecalciferol (Vitamin D) 50 MCG (2000 UT) CAPS Take by mouth every morning, Historical Med      Glucosamine-Chondroitin-MSM (GLUCOSAMINE CHONDROIT MSM DS PO) Take by mouth every morning 2 tabs, Historical Med      Lactobacillus-Inulin (CULTURELLE DIGESTIVE DAILY PO) Take by mouth every morning, Historical Med      loratadine (CLARITIN) 10 mg tablet Take 10 mg by mouth daily, Historical Med      losartan-hydrochlorothiazide (HYZAAR) 100-12 5 MG per tablet Take 1 tablet by mouth daily, Starting Wed 3/8/2023, Normal      meloxicam (Mobic) 15 mg tablet Take 1 tablet (15 mg total) by mouth daily, Starting Wed 10/26/2022, Normal      multivitamin (THERAGRAN) TABS Take 1 tablet by mouth every morning, Historical Med      timolol (BETIMOL) 0 5 % ophthalmic solution Administer 1 drop to both eyes every morning, Historical Med      timolol (TIMOPTIC) 0 5 % ophthalmic solution instill 1 drop into both eyes every morning, Historical Med      TURMERIC CURCUMIN PO Take 1,500 mg by mouth every morning Last dose 8/19/21, Historical Med      zinc gluconate 50 mg tablet Take 50 mg by mouth daily, Historical Med             No discharge procedures on file      PDMP Review       Value Time User    PDMP Reviewed  Yes 2/17/2022  3:06 PM Blake Rogel MD          ED Provider  Electronically Signed by           Jenna Jaimes DO  06/08/23 1636

## 2023-06-12 ENCOUNTER — OFFICE VISIT (OUTPATIENT)
Dept: CARDIOLOGY CLINIC | Facility: CLINIC | Age: 68
End: 2023-06-12
Payer: COMMERCIAL

## 2023-06-12 VITALS
BODY MASS INDEX: 25.49 KG/M2 | OXYGEN SATURATION: 98 % | SYSTOLIC BLOOD PRESSURE: 124 MMHG | WEIGHT: 153 LBS | HEART RATE: 72 BPM | HEIGHT: 65 IN | DIASTOLIC BLOOD PRESSURE: 64 MMHG

## 2023-06-12 DIAGNOSIS — E78.2 MIXED HYPERLIPIDEMIA: ICD-10-CM

## 2023-06-12 DIAGNOSIS — I10 HTN (HYPERTENSION), MALIGNANT: Primary | ICD-10-CM

## 2023-06-12 PROCEDURE — 99214 OFFICE O/P EST MOD 30 MIN: CPT | Performed by: INTERNAL MEDICINE

## 2023-06-12 PROCEDURE — 93000 ELECTROCARDIOGRAM COMPLETE: CPT | Performed by: INTERNAL MEDICINE

## 2023-06-12 NOTE — PROGRESS NOTES
[facility]  [address]  [address]   [place], [address]  Cardiology Follow-up    [facility]  [id number]  1955      1  HTN (hypertension), malignant  POCT ECG      2  Mixed hyperlipidemia  POCT ECG         Discussion/Summary:   Exertional shortness of breath/malignant hypertension- improved  Swelling resolved BP controlled    Abnormal EKG-she has nonspecific anterior T wave changes  She denies having any exertional shortness of breath or chest pain  Her lipids are well controlled  Her blood pressure is controlled  We will continue to monitor  Hyperlipidemia/elevated LFTs- currently on atorvastatin 10 mg  Monitoring of her LFTs  Her last CT was negative for evidence for non alcoholic steatohepatitis  Family history for CVA- previous EKGs negative for atrial fibrillation  Previous Holter monitor was also negative  Varicose veins/swelling- compression sock    HPI:   27-year-old woman with family history for CVA, hypertension, hyperlipidemia presents with recent ER visit secondary to dizziness found to have hypertensive urgency  She states her blood pressures have been better since her medication regimen has been changed  She is trying to watch her sodium intake  She has previously had some exertional shortness of breath  She has also had some chest discomfort which she believes was a pulled muscle  She has been compliant with her medications  She denies having lower extremity swelling  She denies having fevers or chills  09/17/2021: We reviewed through her exercise stress test which showed no evidence of exercise-induced ischemia  Her blood pressure was controlled with exertion  She reports having some leg tightness  No overt edema  Plan to try compression  She has been compliant with medications  4/22/22: Blood pressure are elevated at times  Some dyspnea on exertion  No chest pain  Compliant with meds      10/14/2022:  She is improved blood pressure  Denies having major shortness of breath  Denies having chest heaviness  2023: Blood pressures continue well controlled  She denies having chest heaviness  Denies having exertional shortness of breath  We reviewed through her last lipid panel  PMH  hld- atorvastatin  Htn- bp meds    Social Hx    Retired  [employer]  Not formal exercise  No smoking  Cut out beer  Sleep- no snoring, no daytime fatigue    Family Hx  Grandfather- MI  Mother- stroke/htn      Past Medical History:   Diagnosis Date   • Anxiety    • Diverticulitis of colon    • Fatty liver 2021   • Glaucoma    • H/O cardiovascular stress test 2021    Dr Vinod Roberson   • Hypercholesteremia    • Hypertension    • Osteopenia      Social History     Socioeconomic History   • Marital status: /Civil Union     Spouse name: Not on file   • Number of children: Not on file   • Years of education: Not on file   • Highest education level: Not on file   Occupational History   • Not on file   Tobacco Use   • Smoking status: Never   • Smokeless tobacco: Never   Vaping Use   • Vaping Use: Never used   Substance and Sexual Activity   • Alcohol use: Not Currently   • Drug use: Never   • Sexual activity: Yes     Partners: Male     Birth control/protection: Post-menopausal   Other Topics Concern   • Not on file   Social History Narrative    Tobacco smoking status:   Never smoker        Most recent tobacco use screenin2018          Alcohol intake:    Moderate    Per jackie     Social Determinants of Health     Financial Resource Strain: Not on file   Food Insecurity: Not on file   Transportation Needs: Not on file   Physical Activity: Not on file   Stress: Not on file   Social Connections: Not on file   Intimate Partner Violence: Not on file   Housing Stability: Not on file      Family History   Problem Relation Age of Onset   • Hyperlipidemia Mother    • Hypertension Mother    • Other Mother cerebrovascular accident   • Stroke Mother    • Lung cancer Father 47   • No Known Problems Maternal Grandmother    • Heart disease Maternal Grandfather    • Early death Maternal Grandfather 45        massive MI-congenital defect? • No Known Problems Paternal Grandmother    • No Known Problems Paternal Grandfather    • No Known Problems Brother    • No Known Problems Brother    • No Known Problems Maternal Aunt    • No Known Problems Maternal Aunt    • Breast cancer Paternal Aunt    • Bone cancer Cousin    • ADD / ADHD Neg Hx    • Anesthesia problems Neg Hx    • Cancer Neg Hx    • Clotting disorder Neg Hx    • Collagen disease Neg Hx    • Diabetes Neg Hx    • Dislocations Neg Hx    • Learning disabilities Neg Hx    • Neurological problems Neg Hx    • Osteoporosis Neg Hx    • Rheumatologic disease Neg Hx    • Scoliosis Neg Hx    • Vascular Disease Neg Hx      Past Surgical History:   Procedure Laterality Date   • BREAST CYST EXCISION Right     benign - 2000 approx     • COLONOSCOPY  2016   • FOOT SURGERY Right     removal of cat tooth removed   • MAMMO (HISTORICAL)  12/14/2018,11/10/17   • TONSILLECTOMY     • TOOTH EXTRACTION         Current Outpatient Medications:   •  ALPRAZolam (XANAX) 0 25 mg tablet, Take 1 tablet (0 25 mg total) by mouth daily at bedtime as needed for anxiety, Disp: 30 tablet, Rfl: 0  •  Ascorbic Acid (vitamin C) 1000 MG tablet, Take 1,000 mg by mouth every morning, Disp: , Rfl:   •  atorvastatin (LIPITOR) 10 mg tablet, TAKE 1 TABLET EVERY DAY, Disp: 90 tablet, Rfl: 2  •  B Complex-C (B-complex with vitamin C) tablet, Take 1 tablet by mouth every morning, Disp: , Rfl:   •  BIOTIN PO, Take by mouth, Disp: , Rfl:   •  Calcium Carb-Cholecalciferol (CALTRATE 600+D3 PO), Take by mouth every morning  , Disp: , Rfl:   •  carvedilol (COREG) 12 5 mg tablet, TAKE 1 TABLET (12 5 MG TOTAL) BY MOUTH 2 (TWO) TIMES A DAY WITH MEALS, Disp: 180 tablet, Rfl: 3  •  Cholecalciferol (Vitamin D) 50 MCG (2000 UT) "CAPS, Take by mouth every morning, Disp: , Rfl:   •  Glucosamine-Chondroitin-MSM (GLUCOSAMINE CHONDROIT MSM DS PO), Take by mouth every morning 2 tabs, Disp: , Rfl:   •  Lactobacillus-Inulin (CULTURELLE DIGESTIVE DAILY PO), Take by mouth every morning, Disp: , Rfl:   •  loratadine (CLARITIN) 10 mg tablet, Take 10 mg by mouth daily, Disp: , Rfl:   •  losartan-hydrochlorothiazide (HYZAAR) 100-12 5 MG per tablet, Take 1 tablet by mouth daily, Disp: 90 tablet, Rfl: 3  •  multivitamin (THERAGRAN) TABS, Take 1 tablet by mouth every morning, Disp: , Rfl:   •  timolol (BETIMOL) 0 5 % ophthalmic solution, Administer 1 drop to both eyes every morning, Disp: , Rfl:   •  timolol (TIMOPTIC) 0 5 % ophthalmic solution, instill 1 drop into both eyes every morning, Disp: , Rfl:   •  zinc gluconate 50 mg tablet, Take 50 mg by mouth daily, Disp: , Rfl:   •  meloxicam (Mobic) 15 mg tablet, Take 1 tablet (15 mg total) by mouth daily (Patient not taking: Reported on 1/12/2023), Disp: 20 tablet, Rfl: 0  •  TURMERIC CURCUMIN PO, Take 1,500 mg by mouth every morning Last dose 8/19/21 (Patient not taking: Reported on 10/26/2022), Disp: , Rfl:   Allergies   Allergen Reactions   • Bee Venom Swelling     Vitals:    06/12/23 1308   BP: 124/64   BP Location: Right arm   Patient Position: Sitting   Cuff Size: Standard   Pulse: 72   SpO2: 98%   Weight: 69 4 kg (153 lb)   Height: 5' 5\" (1 651 m)       Review of Systems:   Review of Systems   Constitutional: Negative  Negative for activity change, appetite change, chills, diaphoresis, fatigue, fever and unexpected weight change  HENT: Negative  Negative for congestion, dental problem, drooling, ear discharge, ear pain, facial swelling, hearing loss, mouth sores, nosebleeds, postnasal drip, rhinorrhea, sinus pressure, sinus pain, sneezing, sore throat, tinnitus, trouble swallowing and voice change  Eyes: Negative  Negative for photophobia, pain, redness, itching and visual disturbance   " "  Respiratory: Negative for apnea, cough, choking, chest tightness, shortness of breath, wheezing and stridor  Cardiovascular: Negative  Negative for chest pain, palpitations and leg swelling  Gastrointestinal: Negative  Negative for abdominal distention, abdominal pain, anal bleeding, blood in stool, constipation, diarrhea, nausea, rectal pain and vomiting  Endocrine: Negative  Negative for cold intolerance, heat intolerance, polydipsia, polyphagia and polyuria  Genitourinary: Negative  Negative for decreased urine volume, difficulty urinating, dyspareunia, dysuria, enuresis, flank pain, frequency, genital sores, hematuria, menstrual problem, pelvic pain, urgency, vaginal bleeding, vaginal discharge and vaginal pain  Musculoskeletal: Negative  Negative for arthralgias, back pain, gait problem, joint swelling, myalgias, neck pain and neck stiffness  Skin: Negative  Negative for color change, pallor, rash and wound  Allergic/Immunologic: Negative  Negative for environmental allergies, food allergies and immunocompromised state  Neurological: Negative for dizziness, tremors, seizures, syncope, facial asymmetry, speech difficulty, weakness, light-headedness, numbness and headaches  Hematological: Negative  Negative for adenopathy  Does not bruise/bleed easily  Psychiatric/Behavioral: Negative  Negative for agitation, behavioral problems, confusion, decreased concentration, dysphoric mood, hallucinations, self-injury, sleep disturbance and suicidal ideas  The patient is not nervous/anxious and is not hyperactive  All other systems reviewed and are negative  Vitals:    06/12/23 1308   BP: 124/64   BP Location: Right arm   Patient Position: Sitting   Cuff Size: Standard   Pulse: 72   SpO2: 98%   Weight: 69 4 kg (153 lb)   Height: 5' 5\" (1 651 m)     Physical Examination:   Physical Exam  Constitutional:       General: She is not in acute distress       Appearance: She is " well-developed  She is not diaphoretic  HENT:      Head: Normocephalic and atraumatic  Right Ear: External ear normal       Left Ear: External ear normal    Eyes:      General: No scleral icterus  Right eye: No discharge  Left eye: No discharge  Conjunctiva/sclera: Conjunctivae normal       Pupils: Pupils are equal, round, and reactive to light  Neck:      Thyroid: No thyromegaly  Vascular: No JVD  Trachea: No tracheal deviation  Cardiovascular:      Rate and Rhythm: Normal rate and regular rhythm  Heart sounds: No murmur heard  No friction rub  Gallop present  Pulmonary:      Effort: Pulmonary effort is normal  No respiratory distress  Breath sounds: Normal breath sounds  No stridor  No wheezing or rales  Chest:      Chest wall: No tenderness  Abdominal:      General: Bowel sounds are normal  There is no distension  Palpations: Abdomen is soft  There is no mass  Tenderness: There is no abdominal tenderness  There is no guarding or rebound  Musculoskeletal:         General: No tenderness or deformity  Normal range of motion  Cervical back: Normal range of motion and neck supple  Skin:     General: Skin is warm and dry  Coloration: Skin is not pale  Findings: No erythema or rash  Neurological:      Mental Status: She is alert and oriented to person, place, and time  Cranial Nerves: No cranial nerve deficit  Motor: No abnormal muscle tone  Coordination: Coordination normal       Deep Tendon Reflexes: Reflexes are normal and symmetric  Reflexes normal    Psychiatric:         Behavior: Behavior normal          Thought Content:  Thought content normal          Judgment: Judgment normal          Labs:     Lab Results   Component Value Date    HCT 40 4 07/22/2021    HGB 13 1 07/22/2021    MCV 91 07/22/2021     07/22/2021    RDW 12 6 07/22/2021    WBC 6 02 07/22/2021     BMP:  Lab Results   Component Value Date "   BUN 15 06/02/2023    CALCIUM 9 3 06/02/2023     06/02/2023    CO2 29 06/02/2023    CREATININE 0 65 06/02/2023    EGFR 92 06/02/2023    GLUC 110 07/29/2021    GLUF 117 (H) 06/02/2023    K 3 6 06/02/2023    MG 1 9 07/22/2021    SODIUM 136 06/02/2023     LFT:  Lab Results   Component Value Date    ALB 3 8 03/11/2022    ALKPHOS 46 03/11/2022    ALT 41 03/11/2022    AST 25 03/11/2022    TP 7 4 03/11/2022      No results found for: \"[id number]\"  Lab Results   Component Value Date    HGBA1C 5 2 05/04/2022     Lipid Profile:   Lab Results   Component Value Date    CHOLESTEROL 168 06/02/2023    CHOLESTEROL 164 06/02/2023    HDL 84 06/02/2023    HDL 87 06/02/2023    LDLCALC 70 06/02/2023    LDLCALC 64 06/02/2023    TRIG 63 06/02/2023     Lab Results   Component Value Date    CHOLESTEROL 168 06/02/2023    CHOLESTEROL 164 06/02/2023     Lab Results   Component Value Date    TROPONINI <0 02 07/22/2021     Lab Results   Component Value Date    NTBNP 501 (H) 07/22/2021      Recent Results (from the past 672 hour(s))   Lipoprotein NMR    Collection Time: 06/02/23  8:44 AM   Result Value Ref Range    LDL-P 755 <1000 nmol/L    Total LDL-Chol 70 0 - 99 mg/dL    HDL Cholesterol 84 >39 mg/dL    Cholesterol, Total 168 100 - 199 mg/dL    HDL-P(Total) 39 7 >=30 5 umol/L    LDL Size 21 1 >20 5 nm    Small LDL-P <90 <=527 nmol/L    LP-IR Score 29 <=45    Triglycerides 76 0 - 149 mg/dL   Basic metabolic panel    Collection Time: 06/02/23  8:44 AM   Result Value Ref Range    Sodium 136 135 - 147 mmol/L    Potassium 3 6 3 5 - 5 3 mmol/L    Chloride 106 96 - 108 mmol/L    CO2 29 21 - 32 mmol/L    ANION GAP 1 (L) 4 - 13 mmol/L    BUN 15 5 - 25 mg/dL    Creatinine 0 65 0 60 - 1 30 mg/dL    Glucose, Fasting 117 (H) 65 - 99 mg/dL    Calcium 9 3 8 3 - 10 1 mg/dL    eGFR 92 ml/min/1 73sq m   Lipid panel    Collection Time: 06/02/23  8:44 AM   Result Value Ref Range    Cholesterol 164 See Comment mg/dL    Triglycerides 63 See Comment mg/dL " "   HDL, Direct 87 >=50 mg/dL    LDL Calculated 64 0 - 100 mg/dL    Non-HDL-Chol (CHOL-HDL) 77 mg/dl   B-Type Natriuretic Peptide(BNP)    Collection Time: 06/02/23  8:44 AM   Result Value Ref Range    BNP 64 0 - 100 pg/mL       Imaging & Testing   I have personally reviewed pertinent reports  Cardiac Testing     EKG: Personally reviewed  Reviewed previous EKG normal sinus rhythm no acute ST changes    Normal sinus rhythm at 77 beats per minute nonspecific T-wave changes unchanged from prior      Valentin Cm  [phone]  Please call with any questions or suggestions    Counseling :  A description of the counseling:   Goals and Barriers:  Patient's ability to self care:  Medication side effect reviewed with patient in detail and all their questions answered  \"Portions of the record may have been created with voice recognition software  Occasional wrong word or \"sound a like\" substitutions may have occurred due to the inherent limitations of voice recognition software  Read the chart carefully and recognize, using context, where substitutions have occurred  Please call if you have any questions   \"    "

## 2023-07-06 ENCOUNTER — APPOINTMENT (OUTPATIENT)
Dept: LAB | Facility: CLINIC | Age: 68
End: 2023-07-06
Payer: COMMERCIAL

## 2023-07-06 DIAGNOSIS — R73.09 ABNORMAL GLUCOSE: ICD-10-CM

## 2023-07-06 PROCEDURE — 36415 COLL VENOUS BLD VENIPUNCTURE: CPT

## 2023-07-06 PROCEDURE — 83036 HEMOGLOBIN GLYCOSYLATED A1C: CPT

## 2023-07-09 LAB
EST. AVERAGE GLUCOSE BLD GHB EST-MCNC: 88 MG/DL
HBA1C MFR BLD: 4.7 %

## 2023-07-11 ENCOUNTER — OFFICE VISIT (OUTPATIENT)
Dept: FAMILY MEDICINE CLINIC | Facility: CLINIC | Age: 68
End: 2023-07-11
Payer: COMMERCIAL

## 2023-07-11 VITALS
HEART RATE: 68 BPM | TEMPERATURE: 97.5 F | DIASTOLIC BLOOD PRESSURE: 80 MMHG | WEIGHT: 150 LBS | BODY MASS INDEX: 24.99 KG/M2 | SYSTOLIC BLOOD PRESSURE: 124 MMHG | RESPIRATION RATE: 16 BRPM | HEIGHT: 65 IN | OXYGEN SATURATION: 98 %

## 2023-07-11 DIAGNOSIS — I10 ESSENTIAL HYPERTENSION: Primary | ICD-10-CM

## 2023-07-11 DIAGNOSIS — E55.9 VITAMIN D DEFICIENCY: ICD-10-CM

## 2023-07-11 DIAGNOSIS — E78.5 DYSLIPIDEMIA: ICD-10-CM

## 2023-07-11 DIAGNOSIS — F41.1 GENERALIZED ANXIETY DISORDER: ICD-10-CM

## 2023-07-11 PROCEDURE — G0439 PPPS, SUBSEQ VISIT: HCPCS | Performed by: FAMILY MEDICINE

## 2023-07-11 PROCEDURE — 99214 OFFICE O/P EST MOD 30 MIN: CPT | Performed by: FAMILY MEDICINE

## 2023-07-11 NOTE — PATIENT INSTRUCTIONS
Medicare Preventive Visit Patient Instructions  Thank you for completing your Welcome to Medicare Visit or Medicare Annual Wellness Visit today. Your next wellness visit will be due in one year (7/11/2024). The screening/preventive services that you may require over the next 5-10 years are detailed below. Some tests may not apply to you based off risk factors and/or age. Screening tests ordered at today's visit but not completed yet may show as past due. Also, please note that scanned in results may not display below. Preventive Screenings:  Service Recommendations Previous Testing/Comments   Colorectal Cancer Screening  * Colonoscopy    * Fecal Occult Blood Test (FOBT)/Fecal Immunochemical Test (FIT)  * Fecal DNA/Cologuard Test  * Flexible Sigmoidoscopy Age: 43-73 years old   Colonoscopy: every 10 years (may be performed more frequently if at higher risk)  OR  FOBT/FIT: every 1 year  OR  Cologuard: every 3 years  OR  Sigmoidoscopy: every 5 years  Screening may be recommended earlier than age 39 if at higher risk for colorectal cancer. Also, an individualized decision between you and your healthcare provider will decide whether screening between the ages of 77-80 would be appropriate. Colonoscopy: 08/25/2021  FOBT/FIT: Not on file  Cologuard: Not on file  Sigmoidoscopy: Not on file    Screening Current     Breast Cancer Screening Age: 36 years old  Frequency: every 1-2 years  Not required if history of left and right mastectomy Mammogram: 04/13/2023    Screening Current   Cervical Cancer Screening Between the ages of 21-29, pap smear recommended once every 3 years. Between the ages of 32-69, can perform pap smear with HPV co-testing every 5 years.    Recommendations may differ for women with a history of total hysterectomy, cervical cancer, or abnormal pap smears in past. Pap Smear: Not on file    Screening Not Indicated   Hepatitis C Screening Once for adults born between 1945 and 1965  More frequently in patients at high risk for Hepatitis C Hep C Antibody: 06/15/2020    Screening Current   Diabetes Screening 1-2 times per year if you're at risk for diabetes or have pre-diabetes Fasting glucose: 117 mg/dL (6/2/2023)  A1C: 4.7 % (7/6/2023)  Screening Current   Cholesterol Screening Once every 5 years if you don't have a lipid disorder. May order more often based on risk factors. Lipid panel: 06/02/2023    History Lipid Disorder  Screening Current     Other Preventive Screenings Covered by Medicare:  1. Abdominal Aortic Aneurysm (AAA) Screening: covered once if your at risk. You're considered to be at risk if you have a family history of AAA. 2. Lung Cancer Screening: covers low dose CT scan once per year if you meet all of the following conditions: (1) Age 48-67; (2) No signs or symptoms of lung cancer; (3) Current smoker or have quit smoking within the last 15 years; (4) You have a tobacco smoking history of at least 20 pack years (packs per day multiplied by number of years you smoked); (5) You get a written order from a healthcare provider. 3. Glaucoma Screening: covered annually if you're considered high risk: (1) You have diabetes OR (2) Family history of glaucoma OR (3)  aged 48 and older OR (3)  American aged 72 and older  3. Osteoporosis Screening: covered every 2 years if you meet one of the following conditions: (1) You're estrogen deficient and at risk for osteoporosis based off medical history and other findings; (2) Have a vertebral abnormality; (3) On glucocorticoid therapy for more than 3 months; (4) Have primary hyperparathyroidism; (5) On osteoporosis medications and need to assess response to drug therapy. · Last bone density test (DXA Scan): 04/12/2022.  5. HIV Screening: covered annually if you're between the age of 15-65. Also covered annually if you are younger than 13 and older than 72 with risk factors for HIV infection.  For pregnant patients, it is covered up to 3 times per pregnancy. Immunizations:  Immunization Recommendations   Influenza Vaccine Annual influenza vaccination during flu season is recommended for all persons aged >= 6 months who do not have contraindications   Pneumococcal Vaccine   * Pneumococcal conjugate vaccine = PCV13 (Prevnar 13), PCV15 (Vaxneuvance), PCV20 (Prevnar 20)  * Pneumococcal polysaccharide vaccine = PPSV23 (Pneumovax) Adults 20-63 years old: 1-3 doses may be recommended based on certain risk factors  Adults 72 years old: 1-2 doses may be recommended based off what pneumonia vaccine you previously received   Hepatitis B Vaccine 3 dose series if at intermediate or high risk (ex: diabetes, end stage renal disease, liver disease)   Tetanus (Td) Vaccine - COST NOT COVERED BY MEDICARE PART B Following completion of primary series, a booster dose should be given every 10 years to maintain immunity against tetanus. Td may also be given as tetanus wound prophylaxis. Tdap Vaccine - COST NOT COVERED BY MEDICARE PART B Recommended at least once for all adults. For pregnant patients, recommended with each pregnancy. Shingles Vaccine (Shingrix) - COST NOT COVERED BY MEDICARE PART B  2 shot series recommended in those aged 48 and above     Health Maintenance Due:      Topic Date Due   • Breast Cancer Screening: Mammogram  04/13/2024   • Colorectal Cancer Screening  08/23/2028   • Hepatitis C Screening  Completed     Immunizations Due:      Topic Date Due   • COVID-19 Vaccine (1) Never done   • Pneumococcal Vaccine: 65+ Years (2 - PCV) 01/18/2022   • Influenza Vaccine (1) 09/01/2023     Advance Directives   What are advance directives? Advance directives are legal documents that state your wishes and plans for medical care. These plans are made ahead of time in case you lose your ability to make decisions for yourself. Advance directives can apply to any medical decision, such as the treatments you want, and if you want to donate organs.    What are the types of advance directives? There are many types of advance directives, and each state has rules about how to use them. You may choose a combination of any of the following:  · Living will: This is a written record of the treatment you want. You can also choose which treatments you do not want, which to limit, and which to stop at a certain time. This includes surgery, medicine, IV fluid, and tube feedings. · Durable power of  for healthcare [facility]): This is a written record that states who you want to make healthcare choices for you when you are unable to make them for yourself. This person, called a proxy, is usually a family member or a friend. You may choose more than 1 proxy. · Do not resuscitate (DNR) order:  A DNR order is used in case your heart stops beating or you stop breathing. It is a request not to have certain forms of treatment, such as CPR. A DNR order may be included in other types of advance directives. · Medical directive: This covers the care that you want if you are in a coma, near death, or unable to make decisions for yourself. You can list the treatments you want for each condition. Treatment may include pain medicine, surgery, blood transfusions, dialysis, IV or tube feedings, and a ventilator (breathing machine). · Values history: This document has questions about your views, beliefs, and how you feel and think about life. This information can help others choose the care that you would choose. Why are advance directives important? An advance directive helps you control your care. Although spoken wishes may be used, it is better to have your wishes written down. Spoken wishes can be misunderstood, or not followed. Treatments may be given even if you do not want them. An advance directive may make it easier for your family to make difficult choices about your care.        © Copyright [employer] 2018 Information is for End User's use only and may not be sold, redistributed or otherwise used for commercial purposes.  All illustrations and images included in CareNotes® are the copyrighted property of [employer] Inc[employer] or [address] [place] [address]

## 2023-07-11 NOTE — PROGRESS NOTES
Assessment/Plan:         Problem List Items Addressed This Visit        Cardiovascular and Mediastinum    Essential hypertension - Primary     Well controlled on current therapy continue with current medications and will reassess next visit              Other    Dyslipidemia     Lipid panel ok         Generalized anxiety disorder    Vitamin D deficiency     Labs ok on supplement              Subjective:  Pt is here for interval visit and evaluation of multiple medical problems, review of medications, labs, Health Maintenance and any recent specialty consults       Patient ID: Marcia Washington is a 79 y.o. female. HPI    The following portions of the patient's history were reviewed and updated as appropriate:   Past Medical History:  She has a past medical history of Anxiety, Diverticulitis of colon, Fatty liver (6/17/2021), Glaucoma, H/O cardiovascular stress test (08/16/2021), Hypercholesteremia, Hypertension, and Osteopenia. ,  _______________________________________________________________________  Medical Problems:  does not have any pertinent problems on file.,  _______________________________________________________________________  Past Surgical History:   has a past surgical history that includes Tonsillectomy; Mammo (historical) (12/14/2018,11/10/17); Breast cyst excision (Right); Colonoscopy (2016); Foot surgery (Right); and Tooth extraction. ,  _______________________________________________________________________  Family History:  family history includes Bone cancer in her cousin; Breast cancer in her paternal aunt; Early death (age of onset: 45) in her maternal grandfather; Heart disease in her maternal grandfather; Hyperlipidemia in her mother; Hypertension in her mother; Lung cancer (age of onset: 47) in her father; No Known Problems in her brother, brother, maternal aunt, maternal aunt, maternal grandmother, paternal grandfather, and paternal grandmother;  Other in her mother; Stroke in her mother.,  _______________________________________________________________________  Social History:   reports that she has never smoked. She has never used smokeless tobacco. She reports current alcohol use. She reports that she does not use drugs. ,  _______________________________________________________________________  Allergies:  is allergic to bee venom. .  _______________________________________________________________________  Current Outpatient Medications   Medication Sig Dispense Refill   • ALPRAZolam (XANAX) 0.25 mg tablet Take 1 tablet (0.25 mg total) by mouth daily at bedtime as needed for anxiety 30 tablet 0   • Ascorbic Acid (vitamin C) 1000 MG tablet Take 1,000 mg by mouth every morning     • atorvastatin (LIPITOR) 10 mg tablet TAKE 1 TABLET EVERY DAY 90 tablet 2   • B Complex-C (B-complex with vitamin C) tablet Take 1 tablet by mouth every morning     • BIOTIN PO Take by mouth     • Calcium Carb-Cholecalciferol (CALTRATE 600+D3 PO) Take by mouth every morning       • carvedilol (COREG) 12.5 mg tablet TAKE 1 TABLET (12.5 MG TOTAL) BY MOUTH 2 (TWO) TIMES A DAY WITH MEALS 180 tablet 3   • Cholecalciferol (Vitamin D) 50 MCG (2000 UT) CAPS Take by mouth every morning     • Glucosamine-Chondroitin-MSM (GLUCOSAMINE CHONDROIT MSM DS PO) Take by mouth every morning 2 tabs     • Lactobacillus-Inulin (CULTURELLE DIGESTIVE DAILY PO) Take by mouth every morning     • loratadine (CLARITIN) 10 mg tablet Take 10 mg by mouth daily     • losartan-hydrochlorothiazide (HYZAAR) 100-12.5 MG per tablet Take 1 tablet by mouth daily 90 tablet 3   • multivitamin (THERAGRAN) TABS Take 1 tablet by mouth every morning     • timolol (BETIMOL) 0.5 % ophthalmic solution Administer 1 drop to both eyes every morning     • zinc gluconate 50 mg tablet Take 50 mg by mouth daily     • meloxicam (Mobic) 15 mg tablet Take 1 tablet (15 mg total) by mouth daily (Patient not taking: Reported on 1/12/2023) 20 tablet 0   • timolol (TIMOPTIC) 0.5 % ophthalmic solution instill 1 drop into both eyes every morning (Patient not taking: Reported on 7/11/2023)     • TURMERIC CURCUMIN PO Take 1,500 mg by mouth every morning Last dose 8/19/21 (Patient not taking: Reported on 10/26/2022)       No current facility-administered medications for this visit.     _______________________________________________________________________  Review of Systems   Constitutional: Negative for appetite change, chills, fatigue and fever. Respiratory: Negative for cough, chest tightness and shortness of breath. Cardiovascular: Negative for chest pain, palpitations and leg swelling. Gastrointestinal: Negative for abdominal pain, constipation, diarrhea, nausea and vomiting. Genitourinary: Negative for difficulty urinating and frequency. Musculoskeletal: Negative for arthralgias, back pain, gait problem and neck pain. Skin: Negative for rash. Neurological: Negative for dizziness, weakness, light-headedness, numbness and headaches. Hematological: Does not bruise/bleed easily. Psychiatric/Behavioral: Negative for dysphoric mood and sleep disturbance. The patient is not nervous/anxious. Objective:  Vitals:    07/11/23 1322   BP: 124/80   BP Location: Left arm   Patient Position: Sitting   Cuff Size: Standard   Pulse: 68   Resp: 16   Temp: 97.5 °F (36.4 °C)   TempSrc: Tympanic   SpO2: 98%   Weight: 68 kg (150 lb)   Height: 5' 5" (1.651 m)     Body mass index is 24.96 kg/m². Physical Exam  Vitals reviewed. Constitutional:       General: She is not in acute distress. Appearance: Normal appearance. She is well-developed. She is not ill-appearing. HENT:      Mouth/Throat:      Mouth: Mucous membranes are moist.   Eyes:      Extraocular Movements: Extraocular movements intact. Conjunctiva/sclera: Conjunctivae normal.      Pupils: Pupils are equal, round, and reactive to light. Neck:      Thyroid: No thyromegaly. Vascular: No carotid bruit. Cardiovascular:      Rate and Rhythm: Normal rate and regular rhythm. Pulses: Normal pulses. Heart sounds: Normal heart sounds. No murmur heard. Pulmonary:      Effort: Pulmonary effort is normal. No respiratory distress. Breath sounds: Normal breath sounds. Chest:      Chest wall: No tenderness. Abdominal:      General: Bowel sounds are normal. There is no distension. Palpations: Abdomen is soft. Tenderness: There is no abdominal tenderness. Musculoskeletal:      Cervical back: Normal range of motion and neck supple. Lymphadenopathy:      Cervical: No cervical adenopathy. Skin:     General: Skin is warm and dry. Neurological:      General: No focal deficit present. Mental Status: She is alert and oriented to person, place, and time. Mental status is at baseline. Cranial Nerves: No cranial nerve deficit. Deep Tendon Reflexes: Reflexes normal.   Psychiatric:         Mood and Affect: Mood normal.         Behavior: Behavior normal.       BMI Counseling: Body mass index is 24.96 kg/m². The BMI is above normal. Nutrition recommendations include 3-5 servings of fruits/vegetables daily, reducing fast food intake, consuming healthier snacks, decreasing soda and/or juice intake, moderation in carbohydrate intake and increasing intake of lean protein. Exercise recommendations include exercising 3-5 times per week and strength training exercises.

## 2023-07-11 NOTE — PROGRESS NOTES
Assessment and Plan:     Problem List Items Addressed This Visit        Cardiovascular and Mediastinum    Essential hypertension - Primary     Well controlled on current therapy continue with current medications and will reassess next visit              Other    Dyslipidemia     Lipid panel ok         Generalized anxiety disorder    Vitamin D deficiency     Labs ok on supplement             Preventive health issues were discussed with patient, and age appropriate screening tests were ordered as noted in patient's After Visit Summary. Personalized health advice and appropriate referrals for health education or preventive services given if needed, as noted in patient's After Visit Summary. History of Present Illness:     Patient presents for a Medicare Wellness Visit    HPI   Patient Care Team:  Alethea Mccloud MD as PCP - General (Family Medicine)  Alethea Mccloud MD as PCP - PCP-[facility] ([facility])     Review of Systems:     Review of Systems     Problem List:     Patient Active Problem List   Diagnosis   • Plantar fasciitis, bilateral   • Essential hypertension   • Other specified glaucoma   • Annual physical exam   • Dyslipidemia   • Osteopenia of multiple sites   • Diverticulosis   • Acute knee pain   • Family history of myocardial infarction   • Fatty liver   • Generalized anxiety disorder   • Thigh pain, musculoskeletal   • Vitamin D deficiency      Past Medical and Surgical History:     Past Medical History:   Diagnosis Date   • Anxiety    • Diverticulitis of colon    • Fatty liver 6/17/2021   • Glaucoma    • H/O cardiovascular stress test 08/16/2021    Dr. Krystyna Alvarez   • Hypercholesteremia    • Hypertension    • Osteopenia      Past Surgical History:   Procedure Laterality Date   • BREAST CYST EXCISION Right     benign - 2000 approx.    • COLONOSCOPY  2016   • FOOT SURGERY Right     removal of cat tooth removed   • MAMMO (HISTORICAL)  12/14/2018,11/10/17   • TONSILLECTOMY     • TOOTH EXTRACTION Family History:     Family History   Problem Relation Age of Onset   • Hyperlipidemia Mother    • Hypertension Mother    • Other Mother         cerebrovascular accident   • Stroke Mother    • Lung cancer Father 47   • No Known Problems Maternal Grandmother    • Heart disease Maternal Grandfather    • Early death Maternal Grandfather 45        massive MI-congenital defect? • No Known Problems Paternal Grandmother    • No Known Problems Paternal Grandfather    • No Known Problems Brother    • No Known Problems Brother    • No Known Problems Maternal Aunt    • No Known Problems Maternal Aunt    • Breast cancer Paternal Aunt    • Bone cancer Cousin    • ADD / ADHD Neg Hx    • Anesthesia problems Neg Hx    • Cancer Neg Hx    • Clotting disorder Neg Hx    • Collagen disease Neg Hx    • Diabetes Neg Hx    • Dislocations Neg Hx    • Learning disabilities Neg Hx    • Neurological problems Neg Hx    • Osteoporosis Neg Hx    • Rheumatologic disease Neg Hx    • Scoliosis Neg Hx    • Vascular Disease Neg Hx       Social History:     Social History     Socioeconomic History   • Marital status: /Civil Union     Spouse name: None   • Number of children: None   • Years of education: None   • Highest education level: None   Occupational History   • None   Tobacco Use   • Smoking status: Never   • Smokeless tobacco: Never   Vaping Use   • Vaping Use: Never used   Substance and Sexual Activity   • Alcohol use: Yes   • Drug use: Never   • Sexual activity: Yes     Partners: Male     Birth control/protection: Post-menopausal   Other Topics Concern   • None   Social History Narrative    Tobacco smoking status:   Never smoker        Most recent tobacco use screenin2018          Alcohol intake:    Moderate    Per jackie     Social Determinants of Health     Financial Resource Strain: Not on file   Food Insecurity: Not on file   Transportation Needs: Not on file   Physical Activity: Not on file   Stress: Not on file Social Connections: Not on file   Intimate Partner Violence: Not on file   Housing Stability: Not on file      Medications and Allergies:     Current Outpatient Medications   Medication Sig Dispense Refill   • ALPRAZolam (XANAX) 0.25 mg tablet Take 1 tablet (0.25 mg total) by mouth daily at bedtime as needed for anxiety 30 tablet 0   • Ascorbic Acid (vitamin C) 1000 MG tablet Take 1,000 mg by mouth every morning     • atorvastatin (LIPITOR) 10 mg tablet TAKE 1 TABLET EVERY DAY 90 tablet 2   • B Complex-C (B-complex with vitamin C) tablet Take 1 tablet by mouth every morning     • BIOTIN PO Take by mouth     • Calcium Carb-Cholecalciferol (CALTRATE 600+D3 PO) Take by mouth every morning       • carvedilol (COREG) 12.5 mg tablet TAKE 1 TABLET (12.5 MG TOTAL) BY MOUTH 2 (TWO) TIMES A DAY WITH MEALS 180 tablet 3   • Cholecalciferol (Vitamin D) 50 MCG (2000 UT) CAPS Take by mouth every morning     • Glucosamine-Chondroitin-MSM (GLUCOSAMINE CHONDROIT MSM DS PO) Take by mouth every morning 2 tabs     • Lactobacillus-Inulin (CULTURELLE DIGESTIVE DAILY PO) Take by mouth every morning     • loratadine (CLARITIN) 10 mg tablet Take 10 mg by mouth daily     • losartan-hydrochlorothiazide (HYZAAR) 100-12.5 MG per tablet Take 1 tablet by mouth daily 90 tablet 3   • multivitamin (THERAGRAN) TABS Take 1 tablet by mouth every morning     • timolol (BETIMOL) 0.5 % ophthalmic solution Administer 1 drop to both eyes every morning     • zinc gluconate 50 mg tablet Take 50 mg by mouth daily     • meloxicam (Mobic) 15 mg tablet Take 1 tablet (15 mg total) by mouth daily (Patient not taking: Reported on 1/12/2023) 20 tablet 0   • timolol (TIMOPTIC) 0.5 % ophthalmic solution instill 1 drop into both eyes every morning (Patient not taking: Reported on 7/11/2023)     • TURMERIC CURCUMIN PO Take 1,500 mg by mouth every morning Last dose 8/19/21 (Patient not taking: Reported on 10/26/2022)       No current facility-administered medications for this visit. Allergies   Allergen Reactions   • Bee Venom Swelling      Immunizations:     Immunization History   Administered Date(s) Administered   • Pneumococcal Polysaccharide PPV23 01/18/2021   • Rabies-IM Human Diploid Cell Culture 09/12/2014, 09/15/2014, 09/19/2014, 06/05/2023, 06/08/2023   • Tdap 06/01/2016      Health Maintenance:         Topic Date Due   • Breast Cancer Screening: Mammogram  04/13/2024   • Colorectal Cancer Screening  08/23/2028   • Hepatitis C Screening  Completed         Topic Date Due   • COVID-19 Vaccine (1) Never done   • Pneumococcal Vaccine: 65+ Years (2 - PCV) 01/18/2022   • Influenza Vaccine (1) 09/01/2023      Medicare Screening Tests and Risk Assessments:     Keyshawn Coyne is here for her Subsequent Wellness visit. Health Risk Assessment:   Patient rates overall health as very good. Patient feels that their physical health rating is same. Patient is very satisfied with their life. Eyesight was rated as same. Hearing was rated as same. Patient feels that their emotional and mental health rating is same. Patients states they are never, rarely angry. Patient states they are never, rarely unusually tired/fatigued. Pain experienced in the last 7 days has been none. Patient states that she has experienced no weight loss or gain in last 6 months. Depression Screening:   PHQ-2 Score: 0      Fall Risk Screening: In the past year, patient has experienced: no history of falling in past year      Urinary Incontinence Screening:   Patient has not leaked urine accidently in the last six months. Home Safety:  Patient does not have trouble with stairs inside or outside of their home. Patient has working smoke alarms and has working carbon monoxide detector. Home safety hazards include: none. Nutrition:   Current diet is Regular and No Added Salt. Medications:   Patient is currently taking over-the-counter supplements.  OTC medications include: see medication list. Patient is able to manage medications. Activities of Daily Living (ADLs)/Instrumental Activities of Daily Living (IADLs):   Walk and transfer into and out of bed and chair?: Yes  Dress and groom yourself?: Yes    Bathe or shower yourself?: Yes    Feed yourself? Yes  Do your laundry/housekeeping?: Yes  Manage your money, pay your bills and track your expenses?: Yes  Make your own meals?: Yes    Do your own shopping?: Yes    Previous Hospitalizations:   Any hospitalizations or ED visits within the last 12 months?: Yes    How many hospitalizations have you had in the last year?: 1-2    Advance Care Planning:   Living will: Yes    Advanced directive: Yes      Cognitive Screening:   Provider or family/friend/caregiver concerned regarding cognition?: No    PREVENTIVE SCREENINGS      Cardiovascular Screening:    General: History Lipid Disorder and Screening Current      Diabetes Screening:     General: Screening Current      Colorectal Cancer Screening:     General: Screening Current      Breast Cancer Screening:     General: Screening Current      Cervical Cancer Screening:    General: Screening Not Indicated      Osteoporosis Screening:    General: Screening Current      Abdominal Aortic Aneurysm (AAA) Screening:        General: Screening Not Indicated      Lung Cancer Screening:     General: Screening Not Indicated      Hepatitis C Screening:    General: Screening Current    Screening, Brief Intervention, and Referral to Treatment (SBIRT)    Screening      AUDIT-C Screenin) How often did you have a drink containing alcohol in the past year? monthly or less  2) How many drinks did you have on a typical day when you were drinking in the past year?  1 to 2    Single Item Drug Screening:  How often have you used an illegal drug (including marijuana) or a prescription medication for non-medical reasons in the past year? never    Single Item Drug Screen Score: 0  Interpretation: Negative screen for possible drug use disorder    No results found.      Physical Exam:     /80 (BP Location: Left arm, Patient Position: Sitting, Cuff Size: Standard)   Pulse 68   Temp 97.5 °F (36.4 °C) (Tympanic)   Resp 16   Ht 5' 5" (1.651 m)   Wt 68 kg (150 lb)   SpO2 98%   BMI 24.96 kg/m²     Physical Exam     Jacquelyn Richardson MD

## 2023-08-10 DIAGNOSIS — E78.5 HYPERLIPIDEMIA, UNSPECIFIED HYPERLIPIDEMIA TYPE: ICD-10-CM

## 2023-08-10 RX ORDER — ATORVASTATIN CALCIUM 10 MG/1
TABLET, FILM COATED ORAL
Qty: 90 TABLET | Refills: 2 | Status: SHIPPED | OUTPATIENT
Start: 2023-08-10

## 2023-10-24 ENCOUNTER — OFFICE VISIT (OUTPATIENT)
Dept: FAMILY MEDICINE CLINIC | Facility: CLINIC | Age: 68
End: 2023-10-24
Payer: COMMERCIAL

## 2023-10-24 VITALS
OXYGEN SATURATION: 99 % | SYSTOLIC BLOOD PRESSURE: 150 MMHG | HEIGHT: 65 IN | BODY MASS INDEX: 25.16 KG/M2 | DIASTOLIC BLOOD PRESSURE: 80 MMHG | RESPIRATION RATE: 16 BRPM | WEIGHT: 151 LBS | HEART RATE: 61 BPM | TEMPERATURE: 97 F

## 2023-10-24 DIAGNOSIS — I10 ESSENTIAL HYPERTENSION: Primary | ICD-10-CM

## 2023-10-24 DIAGNOSIS — K13.79 LUMP IN MOUTH: ICD-10-CM

## 2023-10-24 DIAGNOSIS — H92.01 EAR PAIN, RIGHT: ICD-10-CM

## 2023-10-24 DIAGNOSIS — M54.2 TENDERNESS OF NECK: ICD-10-CM

## 2023-10-24 PROCEDURE — 99214 OFFICE O/P EST MOD 30 MIN: CPT | Performed by: FAMILY MEDICINE

## 2023-10-24 NOTE — PROGRESS NOTES
Name: Gaetano Lopez      : 1955      MRN: [id number]  Encounter Provider: Jocy Mandujano MD  Encounter Date: 10/24/2023   Encounter department: [facility] MEDICINE    Assessment & Plan     1. Essential hypertension  Assessment & Plan:  Pt to stop beer and decrease salt and follow up 1 mo DASH diet        2. Tenderness of neck  Assessment & Plan:  Tender right upper  neck lateral submandibular area no lumps or enlarged lymph nodes will monitor  if worsens to follow up       3. Lump in mouth  Assessment & Plan: Sergei visible lump ulcer mass under tongue       4. Ear pain, right  Assessment & Plan: On off discomfort in right ear with crackling nl exam possible eustachian tube dysfunction pt to cont allergy meds           Depression Screening and Follow-up Plan: Patient was screened for depression during today's encounter. They screened negative with a PHQ-2 score of 0. Subjective      HPI pt with swollen lymph node in right neck for 1.5 weeks on off and tender also feels  area under right tongue a little swollen no pain  on off   Review of Systems   HENT:  Positive for congestion (mild feels it is her allergies), ear pain (on off discomfort and "crackling ") and mouth sores (feels the right  under tongue area is "puffy"). Negative for sore throat, trouble swallowing and voice change. Feels she has tender lymph node right side of neck   Eyes:         Dry eyes on drops per eye dr    Hematological:  Negative for adenopathy.        Current Outpatient Medications on File Prior to Visit   Medication Sig   • ALPRAZolam (XANAX) 0.25 mg tablet Take 1 tablet (0.25 mg total) by mouth daily at bedtime as needed for anxiety   • Ascorbic Acid (vitamin C) 1000 MG tablet Take 1,000 mg by mouth every morning   • atorvastatin (LIPITOR) 10 mg tablet TAKE 1 TABLET EVERY DAY   • B Complex-C (B-complex with vitamin C) tablet Take 1 tablet by mouth every morning   • BIOTIN PO Take by mouth • Calcium Carb-Cholecalciferol (CALTRATE 600+D3 PO) Take by mouth every morning     • carvedilol (COREG) 12.5 mg tablet TAKE 1 TABLET (12.5 MG TOTAL) BY MOUTH 2 (TWO) TIMES A DAY WITH MEALS   • Cholecalciferol (Vitamin D) 50 MCG (2000 UT) CAPS Take by mouth every morning   • Glucosamine-Chondroitin-MSM (GLUCOSAMINE CHONDROIT MSM DS PO) Take by mouth every morning 2 tabs   • Lactobacillus-Inulin (CULTURELLE DIGESTIVE DAILY PO) Take by mouth every morning   • loratadine (CLARITIN) 10 mg tablet Take 10 mg by mouth daily   • losartan-hydrochlorothiazide (HYZAAR) 100-12.5 MG per tablet Take 1 tablet by mouth daily   • multivitamin (THERAGRAN) TABS Take 1 tablet by mouth every morning   • timolol (BETIMOL) 0.5 % ophthalmic solution Administer 1 drop to both eyes every morning   • zinc gluconate 50 mg tablet Take 50 mg by mouth daily   • [DISCONTINUED] meloxicam (Mobic) 15 mg tablet Take 1 tablet (15 mg total) by mouth daily (Patient not taking: Reported on 1/12/2023)   • [DISCONTINUED] timolol (TIMOPTIC) 0.5 % ophthalmic solution instill 1 drop into both eyes every morning (Patient not taking: Reported on 7/11/2023)   • [DISCONTINUED] TURMERIC CURCUMIN PO Take 1,500 mg by mouth every morning Last dose 8/19/21 (Patient not taking: Reported on 10/26/2022)       Objective     /80   Pulse 61   Temp (!) 97 °F (36.1 °C) (Tympanic)   Resp 16   Ht 5' 5" (1.651 m)   Wt 68.5 kg (151 lb)   SpO2 99%   BMI 25.13 kg/m²     Physical Exam  Constitutional:       General: She is not in acute distress. Appearance: Normal appearance. She is well-developed. She is not ill-appearing. HENT:      Right Ear: Tympanic membrane and ear canal normal.      Left Ear: Tympanic membrane and ear canal normal.      Nose: Nose normal.      Right Sinus: No maxillary sinus tenderness or frontal sinus tenderness. Left Sinus: No maxillary sinus tenderness or frontal sinus tenderness.       Mouth/Throat:      Mouth: Mucous membranes are moist.      Pharynx: No oropharyngeal exudate or posterior oropharyngeal erythema. Tonsils: No tonsillar exudate. Comments: No swelling lump or changes under right tongue   Eyes:      General:         Right eye: No discharge. Left eye: No discharge. Conjunctiva/sclera: Conjunctivae normal.      Pupils: Pupils are equal, round, and reactive to light. Neck:      Comments: No palpable lump or lymphadenopathy  in neck  Cardiovascular:      Rate and Rhythm: Normal rate. Heart sounds: Normal heart sounds. Pulmonary:      Effort: Pulmonary effort is normal.      Breath sounds: Normal breath sounds. Musculoskeletal:      Cervical back: Normal range of motion. Lymphadenopathy:      Cervical: No cervical adenopathy. Neurological:      General: No focal deficit present. Mental Status: She is alert. Mental status is at baseline.    Psychiatric:         Mood and Affect: Mood normal.       Jose Rafael Morfin MD

## 2023-10-24 NOTE — ASSESSMENT & PLAN NOTE
On off discomfort in right ear with crackling nl exam possible eustachian tube dysfunction pt to cont allergy meds

## 2023-10-24 NOTE — ASSESSMENT & PLAN NOTE
Tender right upper  neck lateral submandibular area no lumps or enlarged lymph nodes will monitor  if worsens to follow up

## 2023-11-20 ENCOUNTER — RA CDI HCC (OUTPATIENT)
Dept: OTHER | Facility: HOSPITAL | Age: 68
End: 2023-11-20

## 2023-11-20 NOTE — PROGRESS NOTES
[address] [facility] coding opportunities       Chart reviewed, no opportunity found: [id number]        Patients Insurance     Medicare Insurance: [facility]

## 2023-12-27 DIAGNOSIS — I10 ESSENTIAL HYPERTENSION: ICD-10-CM

## 2023-12-27 RX ORDER — LOSARTAN POTASSIUM AND HYDROCHLOROTHIAZIDE 12.5; 1 MG/1; MG/1
1 TABLET ORAL DAILY
Qty: 90 TABLET | Refills: 3 | Status: SHIPPED | OUTPATIENT
Start: 2023-12-27

## 2024-01-30 ENCOUNTER — OFFICE VISIT (OUTPATIENT)
Dept: FAMILY MEDICINE CLINIC | Facility: CLINIC | Age: 69
End: 2024-01-30
Payer: COMMERCIAL

## 2024-01-30 VITALS
OXYGEN SATURATION: 98 % | BODY MASS INDEX: 24.99 KG/M2 | RESPIRATION RATE: 16 BRPM | WEIGHT: 150 LBS | HEIGHT: 65 IN | SYSTOLIC BLOOD PRESSURE: 120 MMHG | DIASTOLIC BLOOD PRESSURE: 64 MMHG | TEMPERATURE: 97.6 F | HEART RATE: 70 BPM

## 2024-01-30 DIAGNOSIS — E78.5 DYSLIPIDEMIA: ICD-10-CM

## 2024-01-30 DIAGNOSIS — F41.1 ANXIETY STATE: ICD-10-CM

## 2024-01-30 DIAGNOSIS — F41.1 GENERALIZED ANXIETY DISORDER: ICD-10-CM

## 2024-01-30 DIAGNOSIS — E55.9 VITAMIN D DEFICIENCY: ICD-10-CM

## 2024-01-30 DIAGNOSIS — I10 ESSENTIAL HYPERTENSION: Primary | ICD-10-CM

## 2024-01-30 DIAGNOSIS — M85.89 OSTEOPENIA OF MULTIPLE SITES: ICD-10-CM

## 2024-01-30 DIAGNOSIS — Z12.31 ENCOUNTER FOR SCREENING MAMMOGRAM FOR MALIGNANT NEOPLASM OF BREAST: ICD-10-CM

## 2024-01-30 PROBLEM — K13.79 LUMP IN MOUTH: Status: RESOLVED | Noted: 2023-10-24 | Resolved: 2024-01-30

## 2024-01-30 PROBLEM — H92.01 EAR PAIN, RIGHT: Status: RESOLVED | Noted: 2023-10-24 | Resolved: 2024-01-30

## 2024-01-30 PROBLEM — M25.569 ACUTE KNEE PAIN: Status: RESOLVED | Noted: 2021-03-04 | Resolved: 2024-01-30

## 2024-01-30 PROCEDURE — 1159F MED LIST DOCD IN RCRD: CPT | Performed by: FAMILY MEDICINE

## 2024-01-30 PROCEDURE — 99214 OFFICE O/P EST MOD 30 MIN: CPT | Performed by: FAMILY MEDICINE

## 2024-01-30 PROCEDURE — 3078F DIAST BP <80 MM HG: CPT | Performed by: FAMILY MEDICINE

## 2024-01-30 PROCEDURE — 1160F RVW MEDS BY RX/DR IN RCRD: CPT | Performed by: FAMILY MEDICINE

## 2024-01-30 PROCEDURE — 3074F SYST BP LT 130 MM HG: CPT | Performed by: FAMILY MEDICINE

## 2024-01-30 RX ORDER — ALPRAZOLAM 0.25 MG/1
0.25 TABLET ORAL
Qty: 30 TABLET | Refills: 0 | Status: SHIPPED | OUTPATIENT
Start: 2024-01-30

## 2024-01-30 RX ORDER — TIMOLOL MALEATE 5 MG/ML
SOLUTION/ DROPS OPHTHALMIC
COMMUNITY
Start: 2023-12-28 | End: 2024-01-30

## 2024-01-30 NOTE — PROGRESS NOTES
Name: Melony Donovan      : 1955      MRN: [id number]  Encounter Provider: Iesha Duncan MD  Encounter Date: 2024   Encounter department: [facility] MEDICINE    Assessment & Plan     1. Essential hypertension  Assessment & Plan:  Well controlled on current therapy continue with current medications and will reassess next visit        2. Anxiety state  -     ALPRAZolam (XANAX) 0.25 mg tablet; Take 1 tablet (0.25 mg total) by mouth daily at bedtime as needed for anxiety    3. Dyslipidemia  Assessment & Plan:  Labs  nl      4. Generalized anxiety disorder  Assessment & Plan:  Uses xanax sparingly      5. Vitamin D deficiency  Assessment & Plan:  Check level next visit       6. Osteopenia of multiple sites  Assessment & Plan:  Due for dexa     Orders:  -     DXA bone density spine hip and pelvis; Future; Expected date: 2024    7. Encounter for screening mammogram for malignant neoplasm of breast  -     Mammo screening bilateral w 3d & cad; Future; Expected date: 2024           Subjective      HPI  Review of Systems   Constitutional:  Negative for appetite change, chills, fatigue and fever.   Respiratory:  Negative for cough, chest tightness and shortness of breath.    Cardiovascular:  Negative for chest pain, palpitations and leg swelling.   Gastrointestinal:  Negative for abdominal pain, constipation, diarrhea, nausea and vomiting.   Genitourinary:  Negative for difficulty urinating and frequency.   Musculoskeletal:  Negative for arthralgias, back pain, gait problem and neck pain.   Skin:  Negative for rash.   Neurological:  Negative for dizziness, weakness, light-headedness, numbness and headaches.   Hematological:  Does not bruise/bleed easily.   Psychiatric/Behavioral:  Negative for dysphoric mood and sleep disturbance. The patient is not nervous/anxious.        Current Outpatient Medications on File Prior to Visit   Medication Sig   • Ascorbic Acid (vitamin  "C) 1000 MG tablet Take 1,000 mg by mouth every morning   • atorvastatin (LIPITOR) 10 mg tablet TAKE 1 TABLET EVERY DAY   • B Complex-C (B-complex with vitamin C) tablet Take 1 tablet by mouth every morning   • BIOTIN PO Take by mouth   • Calcium Carb-Cholecalciferol (CALTRATE 600+D3 PO) Take by mouth every morning     • carvedilol (COREG) 12.5 mg tablet TAKE 1 TABLET (12.5 MG TOTAL) BY MOUTH 2 (TWO) TIMES A DAY WITH MEALS   • Cholecalciferol (Vitamin D) 50 MCG (2000 UT) CAPS Take by mouth every morning   • Glucosamine-Chondroitin-MSM (GLUCOSAMINE CHONDROIT MSM DS PO) Take by mouth every morning 2 tabs   • Lactobacillus-Inulin (CULTURELLE DIGESTIVE DAILY PO) Take by mouth every morning   • loratadine (CLARITIN) 10 mg tablet Take 10 mg by mouth daily   • losartan-hydrochlorothiazide (HYZAAR) 100-12.5 MG per tablet TAKE 1 TABLET EVERY DAY   • multivitamin (THERAGRAN) TABS Take 1 tablet by mouth every morning   • timolol (BETIMOL) 0.5 % ophthalmic solution Administer 1 drop to both eyes every morning   • zinc gluconate 50 mg tablet Take 50 mg by mouth daily   • [DISCONTINUED] ALPRAZolam (XANAX) 0.25 mg tablet Take 1 tablet (0.25 mg total) by mouth daily at bedtime as needed for anxiety   • [DISCONTINUED] timolol (TIMOPTIC) 0.5 % ophthalmic solution  (Patient not taking: Reported on 1/30/2024)       Objective     /64   Pulse 70   Temp 97.6 °F (36.4 °C) (Tympanic)   Resp 16   Ht 5' 5\" (1.651 m)   Wt 68 kg (150 lb)   SpO2 98%   BMI 24.96 kg/m²     Physical Exam  Vitals reviewed.   Constitutional:       General: She is not in acute distress.     Appearance: Normal appearance. She is well-developed. She is not ill-appearing.   HENT:      Mouth/Throat:      Mouth: Mucous membranes are moist.   Eyes:      Extraocular Movements: Extraocular movements intact.      Conjunctiva/sclera: Conjunctivae normal.      Pupils: Pupils are equal, round, and reactive to light.   Neck:      Thyroid: No thyromegaly.      Vascular: " No carotid bruit.   Cardiovascular:      Rate and Rhythm: Normal rate and regular rhythm.      Pulses: Normal pulses.      Heart sounds: Normal heart sounds. No murmur heard.  Pulmonary:      Effort: Pulmonary effort is normal. No respiratory distress.      Breath sounds: Normal breath sounds.   Chest:      Chest wall: No tenderness.   Breasts:     Right: Normal.      Left: Normal.   Abdominal:      General: Bowel sounds are normal. There is no distension.      Palpations: Abdomen is soft.      Tenderness: There is no abdominal tenderness.   Musculoskeletal:      Cervical back: Normal range of motion and neck supple.   Lymphadenopathy:      Cervical: No cervical adenopathy.   Skin:     General: Skin is warm and dry.   Neurological:      General: No focal deficit present.      Mental Status: She is alert and oriented to person, place, and time. Mental status is at baseline.      Cranial Nerves: No cranial nerve deficit.      Deep Tendon Reflexes: Reflexes normal.   Psychiatric:         Mood and Affect: Mood normal.         Behavior: Behavior normal.       Iesha Duncan MD

## 2024-01-31 ENCOUNTER — VBI (OUTPATIENT)
Dept: ADMINISTRATIVE | Facility: OTHER | Age: 69
End: 2024-01-31

## 2024-03-16 DIAGNOSIS — I10 ESSENTIAL HYPERTENSION: ICD-10-CM

## 2024-03-16 RX ORDER — CARVEDILOL 12.5 MG/1
12.5 TABLET ORAL 2 TIMES DAILY WITH MEALS
Qty: 180 TABLET | Refills: 3 | Status: SHIPPED | OUTPATIENT
Start: 2024-03-16

## 2024-05-08 ENCOUNTER — OFFICE VISIT (OUTPATIENT)
Dept: CARDIOLOGY CLINIC | Facility: CLINIC | Age: 69
End: 2024-05-08
Payer: COMMERCIAL

## 2024-05-08 VITALS
HEIGHT: 65 IN | HEART RATE: 77 BPM | SYSTOLIC BLOOD PRESSURE: 132 MMHG | WEIGHT: 149 LBS | OXYGEN SATURATION: 97 % | BODY MASS INDEX: 24.83 KG/M2 | DIASTOLIC BLOOD PRESSURE: 70 MMHG

## 2024-05-08 DIAGNOSIS — I10 ESSENTIAL HYPERTENSION: Primary | ICD-10-CM

## 2024-05-08 DIAGNOSIS — E78.5 DYSLIPIDEMIA: ICD-10-CM

## 2024-05-08 DIAGNOSIS — Z82.49 FAMILY HISTORY OF MYOCARDIAL INFARCTION: ICD-10-CM

## 2024-05-08 PROCEDURE — 93000 ELECTROCARDIOGRAM COMPLETE: CPT | Performed by: PHYSICIAN ASSISTANT

## 2024-05-08 PROCEDURE — 99214 OFFICE O/P EST MOD 30 MIN: CPT | Performed by: PHYSICIAN ASSISTANT

## 2024-05-08 NOTE — PROGRESS NOTES
Progress Note - Cardiology Office  [facility]    Melony Donovan 68 y.o. female MRN: [id number]  : 1955  Encounter: [id number]      Assessment:     Essential hypertension.  Hyperlipidemia.  General anxiety disorder.    Discussion Summary and Plan:    Essential hypertension.  - BP during today's office visit, 132/70.   - Currently on Coreg 12.5 mg twice daily and losartan-hydrochlorothiazide 100-12.5 mg daily.  Continue current medication regimen.    - 22 TTE: LVEF 55%. Diastolic function is mildly abnormal, consistent with grade I (abnormal) relaxation.     Hyperlipidemia.  - Continue Lipitor 10 mg daily.  - 23 lipid panel: Cholesterol 164, triglycerides 63, HDL 87, LDL 64.    General anxiety disorder.  - Care per PCP.       Patient / Caretaker was advised and educated to call our office  immediately if  patient has any new symptoms of chest pain/shortness of breath, near-syncope, syncope, light headedness sustained palpitations  or any other cardiovascular symptoms before their scheduled follow-up appointment.  Office number was provided #[phone].      Please call [phone] if any questions.  Counseling :    A description of the counseling.  Goals and Barriers.  Patient's ability to self care: Yes  Medication side effect reviewed with patient in detail and all their questions answered to their satisfaction.    HPI :     Melony Donovan is a 68 y.o. female with PMHx of essential hypertension, hyperlipidemia, generalized anxiety disorder, who presents for routine outpatient cardiology follow-up.    Patient was last seen in outpatient cardiology office on 23.  Patient reports since last outpatient cardiology follow-up she has been doing well.  Patient offers no complaints.  She denies experiencing chest pain at rest or with exertion, palpitations, shortness of breath at rest or with exertion, lower extremity edema, orthopnea, lightheadedness,  dizziness, headache, nausea, vomiting.    Review of Systems   All other systems reviewed and are negative.      Historical Information   Past Medical History:   Diagnosis Date    Anxiety     Diverticulitis of colon     Fatty liver 6/17/2021    Glaucoma     H/O cardiovascular stress test 08/16/2021    Dr. Echavarria    Hypercholesteremia     Hypertension     Osteopenia      Past Surgical History:   Procedure Laterality Date    BREAST CYST EXCISION Right     benign - 2000 approx.    COLONOSCOPY  2016    FOOT SURGERY Right     removal of cat tooth removed    MAMMO (HISTORICAL)  12/14/2018,11/10/17    TONSILLECTOMY      TOOTH EXTRACTION       Social History     Substance and Sexual Activity   Alcohol Use Yes     Social History     Substance and Sexual Activity   Drug Use Never     Social History     Tobacco Use   Smoking Status Never   Smokeless Tobacco Never     Family History:   Family History   Problem Relation Age of Onset    Hyperlipidemia Mother     Hypertension Mother     Other Mother         cerebrovascular accident    Stroke Mother     Lung cancer Father 54    No Known Problems Maternal Grandmother     Heart disease Maternal Grandfather     Early death Maternal Grandfather 38        massive MI-congenital defect?    No Known Problems Paternal Grandmother     No Known Problems Paternal Grandfather     No Known Problems Brother     No Known Problems Brother     No Known Problems Maternal Aunt     No Known Problems Maternal Aunt     Breast cancer Paternal Aunt     Bone cancer Cousin     ADD / ADHD Neg Hx     Anesthesia problems Neg Hx     Cancer Neg Hx     Clotting disorder Neg Hx     Collagen disease Neg Hx     Diabetes Neg Hx     Dislocations Neg Hx     Learning disabilities Neg Hx     Neurological problems Neg Hx     Osteoporosis Neg Hx     Rheumatologic disease Neg Hx     Scoliosis Neg Hx     Vascular Disease Neg Hx        Meds/Allergies     Allergies   Allergen Reactions    Bee Venom Swelling       Current  "Outpatient Medications:     ALPRAZolam (XANAX) 0.25 mg tablet, Take 1 tablet (0.25 mg total) by mouth daily at bedtime as needed for anxiety, Disp: 30 tablet, Rfl: 0    Ascorbic Acid (vitamin C) 1000 MG tablet, Take 1,000 mg by mouth every morning, Disp: , Rfl:     atorvastatin (LIPITOR) 10 mg tablet, TAKE 1 TABLET EVERY DAY, Disp: 90 tablet, Rfl: 2    B Complex-C (B-complex with vitamin C) tablet, Take 1 tablet by mouth every morning, Disp: , Rfl:     BIOTIN PO, Take by mouth, Disp: , Rfl:     Calcium Carb-Cholecalciferol (CALTRATE 600+D3 PO), Take by mouth every morning  , Disp: , Rfl:     carvedilol (COREG) 12.5 mg tablet, TAKE 1 TABLET TWICE DAILY WITH MEALS, Disp: 180 tablet, Rfl: 3    Cholecalciferol (Vitamin D) 50 MCG (2000 UT) CAPS, Take by mouth every morning, Disp: , Rfl:     Glucosamine-Chondroitin-MSM (GLUCOSAMINE CHONDROIT MSM DS PO), Take by mouth every morning 2 tabs, Disp: , Rfl:     Lactobacillus-Inulin (CULTURELLE DIGESTIVE DAILY PO), Take by mouth every morning, Disp: , Rfl:     loratadine (CLARITIN) 10 mg tablet, Take 10 mg by mouth daily, Disp: , Rfl:     losartan-hydrochlorothiazide (HYZAAR) 100-12.5 MG per tablet, TAKE 1 TABLET EVERY DAY, Disp: 90 tablet, Rfl: 3    multivitamin (THERAGRAN) TABS, Take 1 tablet by mouth every morning, Disp: , Rfl:     timolol (BETIMOL) 0.5 % ophthalmic solution, Administer 1 drop to both eyes every morning, Disp: , Rfl:     zinc gluconate 50 mg tablet, Take 50 mg by mouth daily, Disp: , Rfl:     Vitals: Blood pressure 132/70, pulse 77, height 5' 5\" (1.651 m), weight 67.6 kg (149 lb), SpO2 97%, not currently breastfeeding.    Body mass index is 24.79 kg/m².  Wt Readings from Last 3 Encounters:   05/08/24 67.6 kg (149 lb)   01/30/24 68 kg (150 lb)   10/24/23 68.5 kg (151 lb)     Vitals:    05/08/24 1400   Weight: 67.6 kg (149 lb)     BP Readings from Last 3 Encounters:   05/08/24 132/70   01/30/24 120/64   10/24/23 150/80       Physical Exam:  Physical " Exam  Vitals reviewed.   Constitutional:       General: She is not in acute distress.  Cardiovascular:      Rate and Rhythm: Normal rate and regular rhythm.      Pulses: Normal pulses.      Heart sounds: Murmur heard.   Pulmonary:      Effort: Pulmonary effort is normal. No respiratory distress.      Breath sounds: Normal breath sounds.   Abdominal:      General: Abdomen is flat. There is no distension.      Palpations: Abdomen is soft.      Tenderness: There is no abdominal tenderness.   Musculoskeletal:      Right lower leg: No edema.      Left lower leg: No edema.   Skin:     General: Skin is warm and dry.   Neurological:      Mental Status: She is alert and oriented to person, place, and time.     Diagnostic Studies Review Cardio:      EK/08/24 EKG: Sinus rhythm, 77 bpm.  Nonspecific T wave abnormality.      Cardiac testing:   Echo complete   Result date: 22    Left Ventricle Left ventricular cavity size is normal. Wall thickness is normal. The left ventricular ejection fraction is 55%. Systolic function is normal.  Wall motion is normal. Diastolic function is mildly abnormal, consistent with grade I (abnormal) relaxation.   Wall Scoring Baseline     The left ventricular wall motion is normal.            Right Ventricle Right ventricular cavity size is normal. Systolic function is normal. Normal tricuspid annular plane systolic excursion (TAPSE) > 1.7 cm. Wall thickness is normal.   Left Atrium The atrium is normal in size.   Right Atrium The atrium is normal in size.   Aortic Valve The aortic valve is trileaflet. The leaflets are mildly thickened. The leaflets are not calcified. The leaflets exhibit normal mobility. There is trace to mild regurgitation. The Aortic Valve has no significant stenosis.   Mitral Valve The mitral valve has normal structure and function. There is trace regurgitation. There is no evidence of stenosis.   Tricuspid Valve Tricuspid valve structure is normal. There is trace  regurgitation. There is no evidence of stenosis. There is no indirect evidence of pulmonary hypertension.   Pulmonic Valve Pulmonic valve structure is normal. There is no evidence of regurgitation. There is no evidence of stenosis.   Ascending Aorta The aortic root is normal in size.   IVC/SVC The inferior vena cava is normal in size.   Pericardium There is no pericardial effusion. The pericardium is normal in appearance.       Lab Review   Lab Results   Component Value Date    WBC 6.02 07/22/2021    HGB 13.1 07/22/2021    HCT 40.4 07/22/2021    MCV 91 07/22/2021    RDW 12.6 07/22/2021     07/22/2021     BMP:  Lab Results   Component Value Date    SODIUM 136 06/02/2023    K 3.6 06/02/2023     06/02/2023    CO2 29 06/02/2023    BUN 15 06/02/2023    CREATININE 0.65 06/02/2023    GLUC 110 07/29/2021    GLUF 117 (H) 06/02/2023    CALCIUM 9.3 06/02/2023    EGFR 92 06/02/2023    MG 1.9 07/22/2021     LFT:  Lab Results   Component Value Date    AST 25 03/11/2022    ALT 41 03/11/2022    ALKPHOS 46 03/11/2022    TP 7.4 03/11/2022    ALB 3.8 03/11/2022      Lab Results   Component Value Date    HGBA1C 4.7 07/06/2023     Lipid Profile:   Lab Results   Component Value Date    CHOLESTEROL 168 06/02/2023    CHOLESTEROL 164 06/02/2023    HDL 84 06/02/2023    HDL 87 06/02/2023    LDLCALC 70 06/02/2023    LDLCALC 64 06/02/2023    TRIG 63 06/02/2023         Tatianna Bañuelos PA-C

## 2024-05-15 ENCOUNTER — HOSPITAL ENCOUNTER (OUTPATIENT)
Dept: RADIOLOGY | Facility: HOSPITAL | Age: 69
Discharge: HOME | End: 2024-05-15
Payer: COMMERCIAL

## 2024-05-15 VITALS — HEIGHT: 65 IN | BODY MASS INDEX: 24.49 KG/M2 | WEIGHT: 147 LBS

## 2024-05-15 DIAGNOSIS — M85.89 OSTEOPENIA OF MULTIPLE SITES: ICD-10-CM

## 2024-05-15 DIAGNOSIS — Z12.31 ENCOUNTER FOR SCREENING MAMMOGRAM FOR MALIGNANT NEOPLASM OF BREAST: ICD-10-CM

## 2024-05-15 PROCEDURE — 77063 BREAST TOMOSYNTHESIS BI: CPT

## 2024-05-15 PROCEDURE — 77080 DXA BONE DENSITY AXIAL: CPT

## 2024-05-15 PROCEDURE — 77067 SCR MAMMO BI INCL CAD: CPT

## 2024-05-16 ENCOUNTER — TELEPHONE (OUTPATIENT)
Dept: FAMILY MEDICINE CLINIC | Facility: CLINIC | Age: 69
End: 2024-05-16

## 2024-05-30 ENCOUNTER — NURSE TRIAGE (OUTPATIENT)
Dept: OTHER | Facility: OTHER | Age: 69
End: 2024-05-30

## 2024-05-30 ENCOUNTER — OFFICE VISIT (OUTPATIENT)
Dept: FAMILY MEDICINE CLINIC | Facility: CLINIC | Age: 69
End: 2024-05-30
Payer: COMMERCIAL

## 2024-05-30 VITALS
SYSTOLIC BLOOD PRESSURE: 120 MMHG | DIASTOLIC BLOOD PRESSURE: 74 MMHG | HEART RATE: 81 BPM | RESPIRATION RATE: 16 BRPM | TEMPERATURE: 97.8 F | BODY MASS INDEX: 25.61 KG/M2 | OXYGEN SATURATION: 97 % | WEIGHT: 150 LBS | HEIGHT: 64 IN

## 2024-05-30 DIAGNOSIS — E78.5 HYPERLIPIDEMIA, UNSPECIFIED HYPERLIPIDEMIA TYPE: ICD-10-CM
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
## 2024-05-30 PROCEDURE — G2211 COMPLEX E/M VISIT ADD ON: HCPCS | Performed by: INTERNAL MEDICINE

## 2024-05-30 PROCEDURE — 99213 OFFICE O/P EST LOW 20 MIN: CPT | Performed by: INTERNAL MEDICINE

## 2024-05-30 RX ORDER — CEPHALEXIN 500 MG/1
500 CAPSULE ORAL 2 TIMES DAILY
Qty: 14 CAPSULE | Refills: 0 | Status: SHIPPED | OUTPATIENT
Start: 2024-05-30 | End: 2024-06-06

## 2024-05-30 RX ORDER — METHYLPREDNISOLONE 4 MG/1
TABLET ORAL
Qty: 21 EACH | Refills: 0 | Status: SHIPPED | OUTPATIENT
Start: 2024-05-30

## 2024-05-30 RX ORDER — TRIAMCINOLONE ACETONIDE 5 MG/G
CREAM TOPICAL 2 TIMES DAILY
Qty: 15 G | Refills: 3 | Status: SHIPPED | OUTPATIENT
Start: 2024-05-30 | End: 2024-06-04

## 2024-05-30 NOTE — TELEPHONE ENCOUNTER
Internal Medicine Patient stated that she didn't see the insect, not sure if this was a bee. Patient confirmed an appointment for today 3/30 at 1530.

## 2024-05-30 NOTE — PROGRESS NOTES
Assessment/Plan:Histaminic localized reaction vs cellulitis-suggest benadryl, icing, medrol dose pack and topical steroid and sent antibiotic too although I don't know that she'll need it         Problem List Items Addressed This Visit    None  Visit Diagnoses       Insect bite, unspecified site, initial encounter    -  Primary    Relevant Medications    triamcinolone (KENALOG) 0.5 % cream    methylPREDNISolone 4 MG tablet therapy pack    cephalexin (KEFLEX) 500 mg capsule              Subjective:      Patient ID: Melony Donovan is a 68 y.o. female.    Melony doing yardwork yesterday and was stung/bit by an insect/bee in the right arm-site is very red, itchy, and hot and swollen now-took some benadryl        The following portions of the patient's history were reviewed and updated as appropriate:   Past Medical History:  She has a past medical history of Anxiety, Diverticulitis of colon, Fatty liver (6/17/2021), Glaucoma, H/O cardiovascular stress test (08/16/2021), Hypercholesteremia, Hypertension, and Osteopenia.,  _______________________________________________________________________  Medical Problems:  does not have any pertinent problems on file.,  _______________________________________________________________________  Past Surgical History:   has a past surgical history that includes Tonsillectomy; Mammo (historical) (12/14/2018,11/10/17); Breast cyst excision (Right); Colonoscopy (2016); Foot surgery (Right); and Tooth extraction.,  _______________________________________________________________________  Family History:  family history includes Bone cancer in her cousin; Breast cancer in her paternal aunt; Early death (age of onset: 38) in her maternal grandfather; Heart disease in her maternal grandfather; Hyperlipidemia in her mother; Hypertension in her mother; Lung cancer (age of onset: 54) in her father; No Known Problems in her brother, brother, maternal aunt, maternal aunt, maternal  grandmother, paternal grandfather, and paternal grandmother; Other in her mother; Stroke in her mother.,  _______________________________________________________________________  Social History:   reports that she has never smoked. She has never used smokeless tobacco. She reports current alcohol use. She reports that she does not use drugs.,  _______________________________________________________________________  Allergies:  is allergic to bee venom..  _______________________________________________________________________  Current Outpatient Medications   Medication Sig Dispense Refill    ALPRAZolam (XANAX) 0.25 mg tablet Take 1 tablet (0.25 mg total) by mouth daily at bedtime as needed for anxiety 30 tablet 0    Ascorbic Acid (vitamin C) 1000 MG tablet Take 1,000 mg by mouth every morning      atorvastatin (LIPITOR) 10 mg tablet TAKE 1 TABLET EVERY DAY 90 tablet 2    B Complex-C (B-complex with vitamin C) tablet Take 1 tablet by mouth every morning      BIOTIN PO Take by mouth      Calcium Carb-Cholecalciferol (CALTRATE 600+D3 PO) Take by mouth every morning        carvedilol (COREG) 12.5 mg tablet TAKE 1 TABLET TWICE DAILY WITH MEALS 180 tablet 3    cephalexin (KEFLEX) 500 mg capsule Take 1 capsule (500 mg total) by mouth 2 (two) times a day for 7 days 14 capsule 0    Cholecalciferol (Vitamin D) 50 MCG (2000 UT) CAPS Take by mouth every morning      Glucosamine-Chondroitin-MSM (GLUCOSAMINE CHONDROIT MSM DS PO) Take by mouth every morning 2 tabs      Lactobacillus-Inulin (CULTURELLE DIGESTIVE DAILY PO) Take by mouth every morning      loratadine (CLARITIN) 10 mg tablet Take 10 mg by mouth daily      losartan-hydrochlorothiazide (HYZAAR) 100-12.5 MG per tablet TAKE 1 TABLET EVERY DAY 90 tablet 3    methylPREDNISolone 4 MG tablet therapy pack Use as directed on package 21 each 0    multivitamin (THERAGRAN) TABS Take 1 tablet by mouth every morning      timolol (BETIMOL) 0.5 % ophthalmic solution Administer 1 drop  "to both eyes every morning      triamcinolone (KENALOG) 0.5 % cream Apply topically 2 (two) times a day for 5 days 15 g 3    zinc gluconate 50 mg tablet Take 50 mg by mouth daily       No current facility-administered medications for this visit.     _______________________________________________________________________  Review of Systems   Skin:  Positive for rash.         Objective:  Vitals:    05/30/24 1558   BP: 120/74   BP Location: Left arm   Patient Position: Sitting   Cuff Size: Standard   Pulse: 81   Resp: 16   Temp: 97.8 °F (36.6 °C)   TempSrc: Tympanic   SpO2: 97%   Weight: 68 kg (150 lb)   Height: 5' 4\" (1.626 m)     Body mass index is 25.75 kg/m².     Physical Exam  Constitutional:       Appearance: Normal appearance.   HENT:      Head: Normocephalic and atraumatic.      Right Ear: External ear normal.      Left Ear: External ear normal.   Musculoskeletal:      Cervical back: Normal range of motion.   Skin:     Findings: Erythema, lesion and rash present.      Comments: Right arm with central blistered punctum surrounding erythema and warmth   Neurological:      Mental Status: She is alert.         "

## 2024-05-30 NOTE — TELEPHONE ENCOUNTER
"Reason for Disposition  • Red or very tender (to touch) area, and started over 24 hours after the sting    Answer Assessment - Initial Assessment Questions  1. TYPE: \"What type of sting was it?\" (bee, yellow jacket, etc.)       Unknown   2. ONSET: \"When did it occur?\"       Yesterday   3. LOCATION: \"Where is the sting located?\"  \"How many stings?\"      Right hand   4. SWELLING SIZE: \"How big is the swelling?\" (e.g., inches or cm)      3x3 inches   5. REDNESS: \"Is the area red or pink?\" If Yes, ask: \"What size is area of redness?\" (e.g., inches or cm). \"When did the redness start?\"      Redness   6. PAIN: \"Is there any pain?\" If Yes, ask: \"How bad is it?\"  (Scale 1-10; or mild, moderate, severe)      No   7. ITCHING: \"Is there any itching?\" If Yes, ask: \"How bad is it?\"       MODERATE   8. RESPIRATORY DISTRESS: \"Describe your breathing.\"      No   9. PRIOR REACTIONS: \"Have you had any severe allergic reactions to stings in the past?\" if yes, ask: \"What happened?\"      Allergic reaction to bee sting   10. OTHER SYMPTOMS: \"Do you have any other symptoms?\" (e.g., abdominal pain, face or tongue swelling, new rash elsewhere, vomiting)        No   11. PREGNANCY: \"Is there any chance you are pregnant?\" \"When was your last menstrual period?\"        N/A    Protocols used: Bee or Yellow Jacket Sting-ADULT-[place]    "

## 2024-05-31 ENCOUNTER — TELEPHONE (OUTPATIENT)
Age: 69
End: 2024-05-31

## 2024-05-31 RX ORDER — ATORVASTATIN CALCIUM 10 MG/1
TABLET, FILM COATED ORAL
Qty: 90 TABLET | Refills: 1 | Status: SHIPPED | OUTPATIENT
Start: 2024-05-31

## 2024-05-31 NOTE — TELEPHONE ENCOUNTER
Melony called in with some concerned about the medicine that Dr Marquez gave her yesterday for the bug bite. She would like to know if it's alright to put any cream on the site for relief of itch in between the  4 hours. She also wants to know is she can take Tylenol  for a headache with the Prednisolone that was prescribed. Please Advise Thank you. She can be reach on her elaina line or [facility]

## 2024-05-31 NOTE — TELEPHONE ENCOUNTER
Of course she can use the kenalog cream that we prescribed on the site, or benadryl cream-medrol dose pack was prescribed, that 's the steroid not prednisolone and yes tylenol is fine

## 2024-06-06 ENCOUNTER — OFFICE VISIT (OUTPATIENT)
Age: 69
End: 2024-06-06
Payer: COMMERCIAL

## 2024-06-06 ENCOUNTER — APPOINTMENT (OUTPATIENT)
Dept: RADIOLOGY | Facility: CLINIC | Age: 69
End: 2024-06-06
Payer: COMMERCIAL

## 2024-06-06 VITALS
WEIGHT: 150 LBS | HEIGHT: 64 IN | HEART RATE: 67 BPM | SYSTOLIC BLOOD PRESSURE: 129 MMHG | DIASTOLIC BLOOD PRESSURE: 79 MMHG | BODY MASS INDEX: 25.61 KG/M2

## 2024-06-06 DIAGNOSIS — M79.673 PAIN OF FOOT, UNSPECIFIED LATERALITY: ICD-10-CM

## 2024-06-06 DIAGNOSIS — M79.673 PAIN OF FOOT, UNSPECIFIED LATERALITY: Primary | ICD-10-CM

## 2024-06-06 DIAGNOSIS — L60.1 TRAUMATIC ONYCHOLYSIS: ICD-10-CM

## 2024-06-06 PROCEDURE — 73660 X-RAY EXAM OF TOE(S): CPT

## 2024-06-06 PROCEDURE — 99203 OFFICE O/P NEW LOW 30 MIN: CPT | Performed by: STUDENT IN AN ORGANIZED HEALTH CARE EDUCATION/TRAINING PROGRAM

## 2024-06-06 NOTE — PROGRESS NOTES
This patient was seen on 6/6/2024.    My role is Foot , Ankle, and Wound Specialist    ASSESSMENT     Diagnoses and all orders for this visit:    Pain of foot, unspecified laterality  -     XR toe right great min 2 view; Future    Traumatic onycholysis         Problem List Items Addressed This Visit    None  Visit Diagnoses       Pain of foot, unspecified laterality    -  Primary    Relevant Orders    XR toe right great min 2 view    Traumatic onycholysis              PLAN  -Melony and I discussed her right foot  -Continue weightbearing as tolerated  -I did offer nail avulsion to the right hallux today, patient politely declined  -Return to clinic as needed for new or worsening podiatric concerns    X-ray of the right great toe from 6/6/2024 interpreted independently: No acute osseous abnormalities noted, no abnormalities noted to the soft tissues.    SUBJECTIVE    Chief Complaint:  Right toe pain     Patient ID: Melony Donovan     6/6/2024: Melony is a pleasant 68-year-old female with a past medical history of hypertension, dyslipidemia who presents today with right big toenail discoloration.  She states that she first noticed this maybe 3 or 4 months ago when she was taking her nail polish off.  She states that the nail was growing out and is only loosely adhered to the underlying nailbed.  She also states that her cuticle is just a little tender.  She has not attempted any treatment thus far.        The following portions of the patient's history were reviewed and updated as appropriate: allergies, current medications, past family history, past medical history, past social history, past surgical history and problem list.    Review of Systems   Constitutional: Negative.    HENT: Negative.     Respiratory: Negative.     Cardiovascular: Negative.    Gastrointestinal: Negative.    Musculoskeletal:  Positive for arthralgias.   Skin: Negative.    Neurological: Negative.          OBJECTIVE      /79    "Pulse 67   Ht 5' 4\" (1.626 m)   Wt 68 kg (150 lb)   BMI 25.75 kg/m²        Physical Exam  Constitutional:       Appearance: Normal appearance.   HENT:      Head: Normocephalic and atraumatic.   Eyes:      General:         Right eye: No discharge.         Left eye: No discharge.   Cardiovascular:      Rate and Rhythm: Normal rate and regular rhythm.      Pulses:           Dorsalis pedis pulses are 2+ on the right side and 2+ on the left side.        Posterior tibial pulses are 2+ on the right side and 2+ on the left side.   Pulmonary:      Effort: Pulmonary effort is normal.      Breath sounds: Normal breath sounds.   Skin:     General: Skin is warm.      Capillary Refill: Capillary refill takes less than 2 seconds.   Neurological:      Sensory: Sensation is intact. No sensory deficit.           Vascular:  -DP and PT pulses intact b/l  -Capillary refill time <2 sec b/l  -Digital hair growth: Absent  -Skin temp: WNL    MSK:  -Pain on palpation to right hallux proximally at the eponychium  -No gross deformities noted  -MMT is 5/5 to all muscle compartments of the lower extremity  -Ankle dorsiflexion >10 degrees with knee extended and knee flexed b/l    Derm:  -No lesions, abrasions, or open wounds noted  -No noted interdigital maceration, peeling, malodor  -No callus formation noted on exam  -Discoloration noted underneath the right hallux nail with the nail loosely adhered distally suggestive of traumatic onycholysis.      "

## 2024-06-07 ENCOUNTER — OFFICE VISIT (OUTPATIENT)
Dept: FAMILY MEDICINE CLINIC | Facility: CLINIC | Age: 69
End: 2024-06-07
Payer: COMMERCIAL

## 2024-06-07 ENCOUNTER — TELEPHONE (OUTPATIENT)
Age: 69
End: 2024-06-07

## 2024-06-07 VITALS
OXYGEN SATURATION: 99 % | BODY MASS INDEX: 25.37 KG/M2 | WEIGHT: 148.6 LBS | HEART RATE: 71 BPM | SYSTOLIC BLOOD PRESSURE: 110 MMHG | TEMPERATURE: 98.1 F | DIASTOLIC BLOOD PRESSURE: 67 MMHG | HEIGHT: 64 IN
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
## 2024-06-07 DIAGNOSIS — S50.862D INSECT BITE OF LEFT FOREARM, SUBSEQUENT ENCOUNTER: Primary | ICD-10-CM
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
## 2024-06-07 PROBLEM — S50.862A INSECT BITE OF LEFT FOREARM: Status: ACTIVE | Noted: 2024-06-07

## 2024-06-07 PROCEDURE — 99213 OFFICE O/P EST LOW 20 MIN: CPT

## 2024-06-07 PROCEDURE — G2211 COMPLEX E/M VISIT ADD ON: HCPCS

## 2024-06-07 RX ORDER — CEPHALEXIN 500 MG/1
500 CAPSULE ORAL 3 TIMES DAILY
Qty: 21 CAPSULE | Refills: 0 | Status: SHIPPED | OUTPATIENT
Start: 2024-06-07 | End: 2024-06-14

## 2024-06-07 NOTE — TELEPHONE ENCOUNTER
Bug bite 2 days ago.L wrist area Very itch red puffy. Just off meds from bug bite last week. Worried  it might worsen over weekend. Made appt for today at 4pm. If PCP doesn't thing she needs to be seen can cancel. Please advise

## 2024-06-07 NOTE — ASSESSMENT & PLAN NOTE
Pt with erythema, itching, swelling and warmth to L forearm. Pt states erythema has increased in size denies pain. Believes was bit by mosquito/bug. Pt was tx for similar symptoms to R forearm on 5/30/24 and completed cephalexin 500 mg bid  and medrol maya on 6/6/24 with resolution of symptoms. Will tx with cephalexin for an additional 7 days secondary to cellulitis, continue triamcinolone cream as prescribed and antihistamine. Can apply ice 15-20 min TID to help reduce swelling and itching. Educate seek immediate care for worsening sxs.

## 2024-06-07 NOTE — PROGRESS NOTES
Ambulatory Visit  Name: Melony Donovan      : 1955      MRN: [id number]  Encounter Provider: ILENE Vanegas  Encounter Date: 2024   Encounter department: [facility] MEDICINE    Assessment & Plan   1. Insect bite of left forearm, subsequent encounter  Assessment & Plan:  Pt with erythema, itching, swelling and warmth to L forearm. Pt states erythema has increased in size denies pain. Believes was bit by mosquito/bug. Pt was tx for similar symptoms to R forearm on 24 and completed cephalexin 500 mg bid  and medrol maya on 24 with resolution of symptoms. Will tx with cephalexin for an additional 7 days secondary to cellulitis, continue triamcinolone cream as prescribed and antihistamine. Can apply ice 15-20 min TID to help reduce swelling and itching. Educate seek immediate care for worsening sxs.  Orders:  -     cephalexin (KEFLEX) 500 mg capsule; Take 1 capsule (500 mg total) by mouth 3 (three) times a day for 7 days       History of Present Illness     Pt reports insect bite 3 days ago believes may be mosquito as was night time. Site on L forearm with erythema, itching, swelling and warmth. Pt states erythema has increased in size denies pain. Pt was tx for similar symptoms on 24 and completed cephalexin 500 mg bid  and medrol maya on 24 with resolution of symptoms. Will tx with cephalexin for an additional 7 days secondary to  suspected cellulitis, continue steroid cream and antihistamine.        Review of Systems   Constitutional:  Negative for chills, diaphoresis, fatigue and fever.   HENT:  Negative for tinnitus and voice change.    Cardiovascular:  Negative for chest pain.   Gastrointestinal:  Negative for diarrhea and nausea.   Musculoskeletal:  Negative for back pain, joint swelling and neck stiffness.   Skin:  Positive for color change. Negative for pallor, rash and wound.   Allergic/Immunologic: Negative for environmental allergies.  Visit Information Date & Time Provider Department Dept. Phone Encounter #  
 5/25/2017  9:30 AM Gertrude Bender MD [facility] [phone] [id number] Your Appointments 6/15/2017  9:15 AM  
Any with Gertrude Bender MD  
[address] [place] [address] and [facility]) Appt Note: f/u 1 month  
 [address]  
[phone]  
  
   
 [phone]  
  
    
 7/20/2017 10:00 AM  
Nurse Visit with Gertrude Bender MD  
[address] [place] [address] and Primary [facility]) Appt Note: follow up PT/INR  
 [phone]  
  
   
 [phone] Upcoming Health Maintenance Date Due  
 GLAUCOMA SCREENING Q2Y 3/16/2017 Pneumococcal 65+ High/Highest Risk (2 of 2 - PPSV23) 8/30/2017* INFLUENZA AGE 9 TO ADULT 8/30/2017* MEDICARE YEARLY EXAM 12/23/2017 DTaP/Tdap/Td series (2 - Td) 5/19/2026 *Topic was postponed. The date shown is not the original due date. Allergies as of 5/25/2017  Review Complete On: 5/19/2017 By: Jeronimo Carrier Severity Noted Reaction Type Reactions Codeine  09/05/2014    Other (comments) Current Immunizations  Reviewed on 4/19/2016 Name Date Influenza Vaccine 9/15/2014 Not reviewed this visit You Were Diagnosed With   
  
 Codes Comments Nocturia    -  Primary ICD-10-CM: R35.1 ICD-9-CM: 788.43 Primary osteoarthritis of both knees     ICD-10-CM: M17.0 ICD-9-CM: 715.16 Essential hypertension     ICD-10-CM: I10 
ICD-9-CM: 401.9 Chronic deep vein thrombosis (DVT) of distal vein of both lower extremities (HCC)     ICD-10-CM: T34.4E3 ICD-9-CM: 453.52 Multiple myeloma, remission status unspecified (HCC)     ICD-10-CM: C90.00 ICD-9-CM: 203.00 Chronic atrial fibrillation (HCC)     ICD-10-CM: A05.1 ICD-9-CM: 427.31 Vitals   Neurological:  Negative for dizziness.   Psychiatric/Behavioral:  Negative for agitation, sleep disturbance and suicidal ideas.      Current Outpatient Medications on File Prior to Visit   Medication Sig Dispense Refill    Ascorbic Acid (vitamin C) 1000 MG tablet Take 1,000 mg by mouth every morning      atorvastatin (LIPITOR) 10 mg tablet TAKE 1 TABLET EVERY DAY 90 tablet 1    B Complex-C (B-complex with vitamin C) tablet Take 1 tablet by mouth every morning      BIOTIN PO Take by mouth      Calcium Carb-Cholecalciferol (CALTRATE 600+D3 PO) Take by mouth every morning        carvedilol (COREG) 12.5 mg tablet TAKE 1 TABLET TWICE DAILY WITH MEALS 180 tablet 3    Cholecalciferol (Vitamin D) 50 MCG (2000 UT) CAPS Take by mouth every morning      Glucosamine-Chondroitin-MSM (GLUCOSAMINE CHONDROIT MSM DS PO) Take by mouth every morning 2 tabs      Lactobacillus-Inulin (CULTURELLE DIGESTIVE DAILY PO) Take by mouth every morning      loratadine (CLARITIN) 10 mg tablet Take 10 mg by mouth daily      losartan-hydrochlorothiazide (HYZAAR) 100-12.5 MG per tablet TAKE 1 TABLET EVERY DAY 90 tablet 3    multivitamin (THERAGRAN) TABS Take 1 tablet by mouth every morning      timolol (BETIMOL) 0.5 % ophthalmic solution Administer 1 drop to both eyes every morning      zinc gluconate 50 mg tablet Take 50 mg by mouth daily      ALPRAZolam (XANAX) 0.25 mg tablet Take 1 tablet (0.25 mg total) by mouth daily at bedtime as needed for anxiety 30 tablet 0    [] cephalexin (KEFLEX) 500 mg capsule Take 1 capsule (500 mg total) by mouth 2 (two) times a day for 7 days (Patient not taking: Reported on 2024) 14 capsule 0    methylPREDNISolone 4 MG tablet therapy pack Use as directed on package (Patient not taking: Reported on 2024) 21 each 0    triamcinolone (KENALOG) 0.5 % cream Apply topically 2 (two) times a day for 5 days (Patient not taking: Reported on 2024) 15 g 3     No current facility-administered medications on  OB Status Smoking Status Hysterectomy Never Smoker Preferred Pharmacy Pharmacy Name Phone [phone] [phone] Your Updated Medication List  
  
   
This list is accurate as of: 5/25/17 12:11 PM.  Always use your most recent med list.  
  
  
  
  
 amiodarone 200 mg tablet Commonly known as:  CORDARONE Take 0.5 Tabs by mouth daily. calcium 500 mg Tab Take 1 Tab by mouth daily. cyclobenzaprine 10 mg tablet Commonly known as:  FLEXERIL Take 1 Tab by mouth three (3) times daily as needed for Muscle Spasm(s). fentaNYL 25 mcg/hr PATCH Commonly known as:  DURAGESIC  
1 Patch by TransDERmal route every seventy-two (72) hours. Max Daily Amount: 1 Patch. GAVISCON EXTRA STRENGTH 160-105 mg [place] Generic drug:  aluminum hydrox-magnesium carb Take 1-2 tablets by mouth four (4) times daily as needed. HYDROcodone-acetaminophen 5-325 mg per tablet Commonly known as:  Hurman Grist Take 1 Tab by mouth every four (4) hours as needed for Pain. Max Daily Amount: 6 Tabs. HYDROmorphone 2 mg tablet Commonly known as:  DILAUDID Take 1 Tab by mouth every four (4) hours as needed for Pain. Max Daily Amount: 12 mg.  
  
 lisinopril 40 mg tablet Commonly known as:  Kathyleen [place] Take 1 Tab by mouth daily. methocarbamol 750 mg tablet Commonly known as:  VTYXUOU-199 Take 1 Tab by mouth three (3) times daily. metoprolol succinate 25 mg XL tablet Commonly known as:  TOPROL-XL Take 0.5 Tabs by mouth daily. oxyCODONE-acetaminophen  mg per tablet Commonly known as:  PERCOCET 10 Take 1 Tab by mouth every eight (8) hours as needed for Pain. Max Daily Amount: 3 Tabs. Polyethylene Glycol 3350 Powd  
by Does Not Apply route daily as needed. potassium chloride 20 mEq tablet Commonly known as:  K-DUR, KLOR-CON  
 "file prior to visit.      Social History     Tobacco Use    Smoking status: Never    Smokeless tobacco: Never   Vaping Use    Vaping status: Never Used   Substance and Sexual Activity    Alcohol use: Yes    Drug use: Never    Sexual activity: Yes     Partners: Male     Birth control/protection: Post-menopausal     Objective     /67 (BP Location: Right arm, Patient Position: Sitting, Cuff Size: Adult)   Pulse 71   Temp 98.1 °F (36.7 °C) (Tympanic)   Ht 5' 4\" (1.626 m)   Wt 67.4 kg (148 lb 9.6 oz)   SpO2 99%   BMI 25.51 kg/m²     Physical Exam  Vitals and nursing note reviewed.   Constitutional:       General: She is not in acute distress.     Appearance: Normal appearance. She is not ill-appearing, toxic-appearing or diaphoretic.   HENT:      Head: Normocephalic and atraumatic.      Mouth/Throat:      Pharynx: Oropharynx is clear.   Eyes:      Pupils: Pupils are equal, round, and reactive to light.   Cardiovascular:      Rate and Rhythm: Normal rate and regular rhythm.      Pulses: Normal pulses.      Heart sounds: Normal heart sounds.   Pulmonary:      Effort: Pulmonary effort is normal. No respiratory distress.      Breath sounds: Normal breath sounds.   Musculoskeletal:         General: Swelling (L forearm) present. Normal range of motion.   Skin:     General: Skin is warm.      Capillary Refill: Capillary refill takes less than 2 seconds.      Findings: Erythema (L forearm) and rash present. No lesion.   Neurological:      General: No focal deficit present.      Mental Status: She is alert and oriented to person, place, and time.      Cranial Nerves: No cranial nerve deficit.      Sensory: No sensory deficit.      Motor: No weakness.      Coordination: Coordination normal.      Gait: Gait normal.      Deep Tendon Reflexes: Reflexes normal.   Psychiatric:         Mood and Affect: Mood normal.         Behavior: Behavior normal.       Administrative Statements     " Take 1 Tab by mouth two (2) times a day. predniSONE 5 mg dose pack Commonly known as:  STERAPRED See administration instruction per 5mg dose pack REVLIMID 25 mg Cap Generic drug:  lenalidomide Take 25 mg by mouth daily. * warfarin 7.5 mg tablet Commonly known as:  COUMADIN Take 7.5 mg by mouth four (4) days a week. 1 tablet (7.5 mg) Monday, Tuesday, Wednesday, Thursday, and Friday * warfarin 7.5 mg tablet Commonly known as:  COUMADIN Take 3.75 mg by mouth three (3) days a week. 1/2 tablet (3.75 mg) on Friday, Saturday, & Sunday * Notice: This list has 2 medication(s) that are the same as other medications prescribed for you. Read the directions carefully, and ask your doctor or other care provider to review them with you. We Performed the Following AMB POC PT/INR [99536 CPT(R)] Introducing hospitals & HEALTH SERVICES! Sue Moore introduces [employer] patient portal. Now you can access parts of your medical record, email your doctor's office, and request medication refills online. 1. In your internet browser, go to [email or web address] 2. Click on the First Time User? Click Here link in the Sign In box. You will see the New Member Sign Up page. 3. Enter your [employer] Access Code exactly as it appears below. You will not need to use this code after youve completed the sign-up process. If you do not sign up before the expiration date, you must request a new code. · [employer] Access Code: [email or web address] Expires: 8/17/2017  7:51 AM 
 
4. Enter the last four digits of your Social Security Number (xxxx) and Date of Birth (mm/dd/yyyy) as indicated and click Submit. You will be taken to the next sign-up page. 5. Create a [employer]t ID. This will be your [employer] login ID and cannot be changed, so think of one that is secure and easy to remember. 6. Create a Wynlinkt password. You can change your password at any time. 7. Enter your Password Reset Question and Answer. This can be used at a later time if you forget your password. 8. Enter your e-mail address. You will receive e-mail notification when new information is available in [address] 9. Click Sign Up. You can now view and download portions of your medical record. 10. Click the Download Summary menu link to download a portable copy of your medical information. If you have questions, please visit the Frequently Asked Questions section of the [employer] website. Remember, [employer] is NOT to be used for urgent needs. For medical emergencies, dial 911. Now available from your iPhone and Android! Please provide this summary of care documentation to your next provider. Your primary care clinician is listed as Deanna Taylor. If you have any questions after today's visit, please call [phone].

## 2024-07-17 ENCOUNTER — OFFICE VISIT (OUTPATIENT)
Dept: FAMILY MEDICINE CLINIC | Facility: CLINIC | Age: 69
End: 2024-07-17
Payer: COMMERCIAL

## 2024-07-17 VITALS
DIASTOLIC BLOOD PRESSURE: 78 MMHG | SYSTOLIC BLOOD PRESSURE: 120 MMHG | RESPIRATION RATE: 16 BRPM | HEART RATE: 75 BPM | TEMPERATURE: 98 F | OXYGEN SATURATION: 98 % | WEIGHT: 147 LBS | HEIGHT: 64 IN | BODY MASS INDEX: 25.1 KG/M2

## 2024-07-17 DIAGNOSIS — E55.9 VITAMIN D DEFICIENCY: ICD-10-CM

## 2024-07-17 DIAGNOSIS — E78.5 DYSLIPIDEMIA: ICD-10-CM

## 2024-07-17 DIAGNOSIS — M19.071 ARTHRITIS OF FIRST METATARSOPHALANGEAL (MTP) JOINT OF RIGHT FOOT: ICD-10-CM

## 2024-07-17 DIAGNOSIS — F41.1 GENERALIZED ANXIETY DISORDER: Primary | ICD-10-CM

## 2024-07-17 DIAGNOSIS — R01.1 NEWLY RECOGNIZED HEART MURMUR: ICD-10-CM

## 2024-07-17 DIAGNOSIS — Z23 ENCOUNTER FOR IMMUNIZATION: ICD-10-CM

## 2024-07-17 DIAGNOSIS — Z00.00 MEDICARE ANNUAL WELLNESS VISIT, SUBSEQUENT: ICD-10-CM

## 2024-07-17 DIAGNOSIS — I10 ESSENTIAL HYPERTENSION: ICD-10-CM
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
## 2024-07-17 PROBLEM — S50.862A INSECT BITE OF LEFT FOREARM: Status: RESOLVED | Noted: 2024-06-07 | Resolved: 2024-07-17

## 2024-07-17 PROCEDURE — 99214 OFFICE O/P EST MOD 30 MIN: CPT | Performed by: FAMILY MEDICINE

## 2024-07-17 PROCEDURE — G0439 PPPS, SUBSEQ VISIT: HCPCS | Performed by: FAMILY MEDICINE

## 2024-07-17 PROCEDURE — G0009 ADMIN PNEUMOCOCCAL VACCINE: HCPCS | Performed by: FAMILY MEDICINE

## 2024-07-17 PROCEDURE — 90677 PCV20 VACCINE IM: CPT | Performed by: FAMILY MEDICINE

## 2024-07-17 NOTE — PROGRESS NOTES
Ambulatory Visit  Name: Melony Donovan      : 1955      MRN: [id number]  Encounter Provider: Iesha Duncan MD  Encounter Date: 2024   Encounter department: [facility] FAMILY MEDICINE    Assessment & Plan   1. Generalized anxiety disorder  Assessment & Plan:  Uses xanax prn  2. Essential hypertension  Assessment & Plan:  Well controlled on current therapy continue with current medications and will reassess next visit    3. Dyslipidemia  Assessment & Plan:  Labs ok on meds   4. Vitamin D deficiency  Assessment & Plan:  Cont otc supplement  5. Medicare annual wellness visit, subsequent  Assessment & Plan:  Reviewed and tests ordered  6. Arthritis of first metatarsophalangeal (MTP) joint of right foot  Assessment & Plan:  Reviewed podiatry note   7. Encounter for immunization  -     Pneumococcal Conjugate Vaccine 20-valent (Pcv20)  8. Newly recognized heart murmur  Assessment & Plan:   systolic will check  Echo  Orders:  -     Echo complete w/ contrast if indicated; Future; Expected date: 2024       Preventive health issues were discussed with patient, and age appropriate screening tests were ordered as noted in patient's After Visit Summary. Personalized health advice and appropriate referrals for health education or preventive services given if needed, as noted in patient's After Visit Summary.    History of Present Illness     HPI   Patient Care Team:  Iesha Duncan MD as PCP - General (Family Medicine)  Iesha Duncan MD as PCP - PCP-[facility] ([facility])    Review of Systems  Medical History Reviewed by provider this encounter:  Tobacco  Allergies  Meds  Problems  Med Hx  Surg Hx  Fam Hx       Annual Wellness Visit Questionnaire   Melony is here for her Subsequent Wellness visit.     Health Risk Assessment:   Patient rates overall health as very good. Patient feels that their physical health rating is same. Patient is very satisfied with their life. Eyesight  was rated as slightly worse. Hearing was rated as same. Patient feels that their emotional and mental health rating is same. Patients states they are never, rarely angry. Patient states they are never, rarely unusually tired/fatigued. Pain experienced in the last 7 days has been some. Patient's pain rating has been 4/10. Patient states that she has experienced no weight loss or gain in last 6 months.     Depression Screening:   PHQ-2 Score: 0      Fall Risk Screening:   In the past year, patient has experienced: no history of falling in past year      Urinary Incontinence Screening:   Patient has not leaked urine accidently in the last six months.     Home Safety:  Patient does not have trouble with stairs inside or outside of their home. Patient has working smoke alarms and has working carbon monoxide detector. Home safety hazards include: none.     Nutrition:   Current diet is Regular and No Added Salt.     Medications:   Patient is currently taking over-the-counter supplements. OTC medications include: see medication list. Patient is able to manage medications.     Activities of Daily Living (ADLs)/Instrumental Activities of Daily Living (IADLs):   Walk and transfer into and out of bed and chair?: Yes  Dress and groom yourself?: Yes    Bathe or shower yourself?: Yes    Feed yourself? Yes  Do your laundry/housekeeping?: Yes  Manage your money, pay your bills and track your expenses?: Yes  Make your own meals?: Yes    Do your own shopping?: Yes    Previous Hospitalizations:   Any hospitalizations or ED visits within the last 12 months?: No      Advance Care Planning:   Living will: Yes    Durable POA for healthcare: Yes    Advanced directive: Yes      Cognitive Screening:   Provider or family/friend/caregiver concerned regarding cognition?: No    PREVENTIVE SCREENINGS      Cardiovascular Screening:    General: History Lipid Disorder and Risks and Benefits Discussed    Due for: Lipid Panel      Diabetes Screening:  "    General: Risks and Benefits Discussed    Due for: Blood Glucose      Colorectal Cancer Screening:     General: Screening Current      Breast Cancer Screening:     General: Screening Current      Cervical Cancer Screening:    General: Screening Not Indicated      Osteoporosis Screening:    General: Screening Current      Abdominal Aortic Aneurysm (AAA) Screening:        General: Screening Not Indicated      Lung Cancer Screening:     General: Screening Not Indicated      Hepatitis C Screening:    General: Screening Current    Screening, Brief Intervention, and Referral to Treatment (SBIRT)    Screening      AUDIT-C Screenin) How often did you have a drink containing alcohol in the past year? monthly or less  2) How many drinks did you have on a typical day when you were drinking in the past year? 1 to 2    Single Item Drug Screening:  How often have you used an illegal drug (including marijuana) or a prescription medication for non-medical reasons in the past year? never    Single Item Drug Screen Score: 0  Interpretation: Negative screen for possible drug use disorder       No results found.    Objective     /78 (BP Location: Left arm, Patient Position: Sitting, Cuff Size: Standard)   Pulse 75   Temp 98 °F (36.7 °C) (Tympanic)   Resp 16   Ht 5' 4\" (1.626 m)   Wt 66.7 kg (147 lb)   SpO2 98%   BMI 25.23 kg/m²     Physical Exam  Cardiovascular:      Heart sounds: Murmur (2/6 systolic) heard.           "

## 2024-07-17 NOTE — PROGRESS NOTES
Subjective: Pt is here for interval visit and evaluation of multiple medical problems, review of medications, labs, Health Maintenance and any recent specialty consults ; podiatry  due for medicare wellness       Patient ID: Melony Donovan is a 68 y.o. female.    HPI    Past Medical History:   Diagnosis Date   • Anxiety    • Diverticulitis of colon    • Fatty liver 6/17/2021   • Glaucoma    • H/O cardiovascular stress test 08/16/2021    Dr. Echavarria   • Hypercholesteremia    • Hypertension    • Osteopenia        Family History   Problem Relation Age of Onset   • Hyperlipidemia Mother    • Hypertension Mother    • Other Mother         cerebrovascular accident   • Stroke Mother    • Lung cancer Father 54   • No Known Problems Maternal Grandmother    • Heart disease Maternal Grandfather    • Early death Maternal Grandfather 38        massive MI-congenital defect?   • No Known Problems Paternal Grandmother    • No Known Problems Paternal Grandfather    • No Known Problems Brother    • No Known Problems Brother    • No Known Problems Maternal Aunt    • No Known Problems Maternal Aunt    • Breast cancer Paternal Aunt         later in life   • Bone cancer Cousin    • ADD / ADHD Neg Hx    • Anesthesia problems Neg Hx    • Cancer Neg Hx    • Clotting disorder Neg Hx    • Collagen disease Neg Hx    • Diabetes Neg Hx    • Dislocations Neg Hx    • Learning disabilities Neg Hx    • Neurological problems Neg Hx    • Osteoporosis Neg Hx    • Rheumatologic disease Neg Hx    • Scoliosis Neg Hx    • Vascular Disease Neg Hx        Past Surgical History:   Procedure Laterality Date   • BREAST CYST EXCISION Right     benign - 2000 approx.   • COLONOSCOPY  2016   • FOOT SURGERY Right     removal of cat tooth removed   • MAMMO (HISTORICAL)  12/14/2018,11/10/17   • TONSILLECTOMY     • TOOTH EXTRACTION          reports that she has never smoked. She has never used smokeless tobacco. She reports current alcohol use. She reports that  she does not use drugs.      Current Outpatient Medications:   •  ALPRAZolam (XANAX) 0.25 mg tablet, Take 1 tablet (0.25 mg total) by mouth daily at bedtime as needed for anxiety, Disp: 30 tablet, Rfl: 0  •  Ascorbic Acid (vitamin C) 1000 MG tablet, Take 1,000 mg by mouth every morning, Disp: , Rfl:   •  atorvastatin (LIPITOR) 10 mg tablet, TAKE 1 TABLET EVERY DAY, Disp: 90 tablet, Rfl: 1  •  B Complex-C (B-complex with vitamin C) tablet, Take 1 tablet by mouth every morning, Disp: , Rfl:   •  BIOTIN PO, Take by mouth, Disp: , Rfl:   •  Calcium Carb-Cholecalciferol (CALTRATE 600+D3 PO), Take by mouth every morning  , Disp: , Rfl:   •  carvedilol (COREG) 12.5 mg tablet, TAKE 1 TABLET TWICE DAILY WITH MEALS, Disp: 180 tablet, Rfl: 3  •  Cholecalciferol (Vitamin D) 50 MCG (2000 UT) CAPS, Take by mouth every morning, Disp: , Rfl:   •  Glucosamine-Chondroitin-MSM (GLUCOSAMINE CHONDROIT MSM DS PO), Take by mouth every morning 2 tabs, Disp: , Rfl:   •  Lactobacillus-Inulin (CULTURELLE DIGESTIVE DAILY PO), Take by mouth every morning, Disp: , Rfl:   •  loratadine (CLARITIN) 10 mg tablet, Take 10 mg by mouth daily, Disp: , Rfl:   •  losartan-hydrochlorothiazide (HYZAAR) 100-12.5 MG per tablet, TAKE 1 TABLET EVERY DAY, Disp: 90 tablet, Rfl: 3  •  multivitamin (THERAGRAN) TABS, Take 1 tablet by mouth every morning, Disp: , Rfl:   •  timolol (BETIMOL) 0.5 % ophthalmic solution, Administer 1 drop to both eyes every morning, Disp: , Rfl:   •  zinc gluconate 50 mg tablet, Take 50 mg by mouth daily, Disp: , Rfl:     The following portions of the patient's history were reviewed and updated as appropriate: allergies, current medications, past family history, past medical history, past social history, past surgical history and problem list.    Review of Systems   Constitutional:  Negative for appetite change, chills, fatigue and fever.   Respiratory:  Negative for cough, chest tightness and shortness of breath.    Cardiovascular:   "Negative for chest pain, palpitations and leg swelling.   Gastrointestinal:  Negative for abdominal pain, constipation, diarrhea, nausea and vomiting.   Genitourinary:  Negative for difficulty urinating and frequency.   Musculoskeletal:  Negative for arthralgias, back pain, gait problem and neck pain.   Skin:  Negative for rash.   Neurological:  Negative for dizziness, weakness, light-headedness, numbness and headaches.   Hematological:  Does not bruise/bleed easily.   Psychiatric/Behavioral:  Negative for dysphoric mood and sleep disturbance. The patient is not nervous/anxious.          PHQ-2/9 Depression Screening    Little interest or pleasure in doing things: 0 - not at all  Feeling down, depressed, or hopeless: 0 - not at all  PHQ-2 Score: 0  PHQ-2 Interpretation: Negative depression screen             Objective:    /78 (BP Location: Left arm, Patient Position: Sitting, Cuff Size: Standard)   Pulse 75   Temp 98 °F (36.7 °C) (Tympanic)   Resp 16   Ht 5' 4\" (1.626 m)   Wt 66.7 kg (147 lb)   SpO2 98%   BMI 25.23 kg/m²      Physical Exam  Vitals reviewed.   Constitutional:       General: She is not in acute distress.     Appearance: Normal appearance. She is well-developed. She is not ill-appearing.   HENT:      Mouth/Throat:      Mouth: Mucous membranes are moist.   Eyes:      Extraocular Movements: Extraocular movements intact.      Conjunctiva/sclera: Conjunctivae normal.      Pupils: Pupils are equal, round, and reactive to light.   Neck:      Thyroid: No thyromegaly.      Vascular: No carotid bruit.   Cardiovascular:      Rate and Rhythm: Normal rate and regular rhythm.      Pulses: Normal pulses.      Heart sounds: Normal heart sounds. No murmur heard.  Pulmonary:      Effort: Pulmonary effort is normal. No respiratory distress.      Breath sounds: Normal breath sounds.   Chest:      Chest wall: No tenderness.   Abdominal:      General: Bowel sounds are normal. There is no distension.      " Palpations: Abdomen is soft.      Tenderness: There is no abdominal tenderness.   Musculoskeletal:      Cervical back: Normal range of motion and neck supple.   Lymphadenopathy:      Cervical: No cervical adenopathy.   Skin:     General: Skin is warm and dry.   Neurological:      General: No focal deficit present.      Mental Status: She is alert and oriented to person, place, and time. Mental status is at baseline.      Cranial Nerves: No cranial nerve deficit.      Deep Tendon Reflexes: Reflexes normal.   Psychiatric:         Mood and Affect: Mood normal.         Behavior: Behavior normal.           No results found for this or any previous visit (from the past 8736 hour(s)).    Laboratory Results: I have personally reviewed the pertinent laboratory results/reports     Radiology/Other Diagnostic Testing Results: I have personally reviewed pertinent reports.      Mammo screening bilateral w 3d & cad    Result Date: 5/17/2024  DIAGNOSIS: Encounter for screening mammogram for malignant neoplasm of breast TECHNIQUE: Digital screening mammography was performed. Computer Aided Detection (CAD) analyzed all applicable images. COMPARISONS: Prior breast imaging dated: 04/13/2023, 04/12/2022, 01/22/2021, 12/16/2019, 12/14/2018, 11/10/2017, 10/28/2016, 10/21/2015, and 09/09/2014 RELEVANT HISTORY: Family Breast Cancer History: History of breast cancer in Paternal Aunt. Family Medical History: Family medical history includes breast cancer in paternal aunt. Personal History: Hormone history includes birth control. Surgical history includes breast excisional biopsy. No known relevant medical history. The patient is scheduled in a reminder system for screening mammography. 8-10% of cancers will be missed on mammography. Management of a palpable abnormality must be based on clinical grounds.  Patients will be notified of their results via letter from our facility. Accredited by American College [employer] and FDA. RISK  ASSESSMENT: 5 Year Tyrer-Cuzick: 1.9% 10 Year Tyrer-Cuzick: 3.85% Lifetime Tyrer-Cuzick: 6.93% TISSUE DENSITY: There are scattered areas of fibroglandular density. INDICATION: Melony Donovan is a 68 y.o. female presenting for screening mammography. FINDINGS: There are no suspicious masses, grouped microcalcifications or areas of architectural distortion. The skin and nipple areolar complex are unremarkable.     No mammographic evidence of malignancy. No significant change ASSESSMENT/BI-RADS CATEGORY: Left: 1 - Negative Right: 1 - Negative Overall: 1 - Negative RECOMMENDATION:      - Routine screening mammogram in 1 year for both breasts. Workstation ID: [id number]     DXA bone density spine hip and pelvis    Result Date: 5/16/2024  CENTRAL  DXA SCAN CLINICAL HISTORY: 68 years postmenopausal female. OTHER RISK FACTORS: Prior fracture as a result of minor injury, parental history of hip fracture status post minor injury hip, alcohol use. PHARMACOLOGIC THERAPY FOR OSTEOPOROSIS:  None. TECHNIQUE: Bone densitometry was performed using a [employer] bone densitometer.  Regions of interest appear properly placed. COMPARISON: 4/12/2022. RESULTS: LUMBAR SPINE Level: L1, L2, L4  (L3 vertebra excluded from analysis due to local structural abnormalities or artifact): BMD: 0.903 gm/cm2 T-score: -2.3 LEFT TOTAL HIP: BMD: 0.840 gm/cm2 T-score: -1.3 LEFT FEMORAL NECK: BMD: 0.761 gm/cm2 T score: -2.0 RIGHT TOTAL HIP: BMD: 0.898 gm/cm2 T-score: -0.9 RIGHT FEMORAL NECK: BMD: 0.841 gm/cm2 T score: -1.4     1. Low bone mass (osteopenia). 2.  Since a DXA study from 4/12/2022, there has been: No statistically significant change in bone mineral density at any of the evaluated skeletal sites. 3.  The 10 year risk of hip fracture is 8.6% with the 10 year risk of major osteoporotic fracture being 35.5% as calculated by the [employer] fracture risk assessment tool (FRAX, which is based on data generated by the WHO  Collaborating [place] for Metabolic Bone Diseases). 4.  The current NOF guidelines recommend treating patients with a T-score of -2.5 or less in the lumbar spine or hips, or in post-menopausal women and men over the age of 50 with low bone mass (osteopenia) and a FRAX 10 year risk score of >3% for hip fracture and/or >20% for major osteoporotic fracture. 5.  The NOF recommends follow-up DXA in 1-2 years after initiating therapy for osteoporosis and every 2 years thereafter. More frequent evaluation is appropriate for patients with conditions associated with rapid bone loss, such as glucocorticoid therapy. The interval between DXA screenings may be longer for individuals without major risk factors and initial T-score in the normal or upper low bone mass range. The FRAX algorithm has certain limitations: -FRAX has not been validated in patients currently or previously treated with pharmacotherapy for osteoporosis.  In such patients, clinical judgment must be exercised in interpreting FRAX scores. -Prior hip, vertebral and humeral fragility fractures appear to confer greater risk of subsequent fracture than fractures at other sites (this is especially true for individuals with severe vertebral fractures), but quantification of this incremental risk is not possible with FRAX. -FRAX underestimates fracture risk in patients with history of multiple fragility fractures. -FRAX may underestimate fracture risk in patients with history of frequent falls. -It is not appropriate to use FRAX to monitor treatment response. WHO CLASSIFICATION: Normal (a T-score of -1.0 or higher) Low bone mineral density (a T-score of less than -1.0 but higher than -2.5) Osteoporosis (a T-score of -2.5 or less) Severe osteoporosis (a T-score of -2.5 or less with a fragility fracture) LEAST SIGNIFICANT CHANGE AT 95% C.I: Lumbar spine: 0.036 gm/cm2 (3.2%). Total hip: 0.018 gm/cm2 (2.0%). Forearm: 0.024 gm/cm2 (3.2%). Workstation performed: [id number]  "       Assessment/Plan:  1. Generalized anxiety disorder  Assessment & Plan:  Uses xanax prn  2. Essential hypertension  Assessment & Plan:  Well controlled on current therapy continue with current medications and will reassess next visit    3. Dyslipidemia  Assessment & Plan:  Labs ok on meds   4. Vitamin D deficiency  Assessment & Plan:  Cont otc supplement  5. Medicare annual wellness visit, subsequent  Assessment & Plan:  Reviewed and tests ordered  6. Arthritis of first metatarsophalangeal (MTP) joint of right foot  Assessment & Plan:  Reviewed podiatry note   7. Encounter for immunization  -     Pneumococcal Conjugate Vaccine 20-valent (Pcv20)  8. Newly recognized heart murmur  Assessment & Plan:  2/6 systolic will check  Echo  Orders:  -     Echo complete w/ contrast if indicated; Future; Expected date: 07/17/2024                   Read package inserts for all medications before starting a new medications, call me if you have any questions.    Patient was given opportunity to ask questions and all questions were answered.    Disclaimer: Portions of the record may have been created with voice recognition software. Occasional wrong word or \"sound a like\" substitutions may have occurred due to the inherent limitations of voice recognition software. Read the chart carefully and recognize, using context, where substitutions have occurred. I have used the Epic copy/forward function to compose this note. I have reviewed my current note to ensure it reflects the current patient status, exam, assessment and plan.  "

## 2024-07-17 NOTE — PATIENT INSTRUCTIONS
Medicare Preventive Visit Patient Instructions  Thank you for completing your Welcome to Medicare Visit or Medicare Annual Wellness Visit today. Your next wellness visit will be due in one year (7/18/2025).  The screening/preventive services that you may require over the next 5-10 years are detailed below. Some tests may not apply to you based off risk factors and/or age. Screening tests ordered at today's visit but not completed yet may show as past due. Also, please note that scanned in results may not display below.  Preventive Screenings:  Service Recommendations Previous Testing/Comments   Colorectal Cancer Screening  * Colonoscopy    * Fecal Occult Blood Test (FOBT)/Fecal Immunochemical Test (FIT)  * Fecal DNA/Cologuard Test  * Flexible Sigmoidoscopy Age: 45-75 years old   Colonoscopy: every 10 years (may be performed more frequently if at higher risk)  OR  FOBT/FIT: every 1 year  OR  Cologuard: every 3 years  OR  Sigmoidoscopy: every 5 years  Screening may be recommended earlier than age 45 if at higher risk for colorectal cancer. Also, an individualized decision between you and your healthcare provider will decide whether screening between the ages of 76-85 would be appropriate. Colonoscopy: 08/25/2021  FOBT/FIT: Not on file  Cologuard: Not on file  Sigmoidoscopy: Not on file    Screening Current     Breast Cancer Screening Age: 40+ years old  Frequency: every 1-2 years  Not required if history of left and right mastectomy Mammogram: 05/15/2024    Screening Current   Cervical Cancer Screening Between the ages of 21-29, pap smear recommended once every 3 years.   Between the ages of 30-65, can perform pap smear with HPV co-testing every 5 years.   Recommendations may differ for women with a history of total hysterectomy, cervical cancer, or abnormal pap smears in past. Pap Smear: Not on file    Screening Not Indicated   Hepatitis C Screening Once for adults born between 1945 and 1965  More frequently in  patients at high risk for Hepatitis C Hep C Antibody: 06/15/2020    Screening Current   Diabetes Screening 1-2 times per year if you're at risk for diabetes or have pre-diabetes Fasting glucose: 117 mg/dL (6/2/2023)  A1C: 4.7 % (7/6/2023)  Risks and Benefits Discussed  Due for Blood Glucose   Cholesterol Screening Once every 5 years if you don't have a lipid disorder. May order more often based on risk factors. Lipid panel: 06/02/2023    History Lipid Disorder  Risks and Benefits Discussed  Due for Lipid Panel     Other Preventive Screenings Covered by Medicare:  Abdominal Aortic Aneurysm (AAA) Screening: covered once if your at risk. You're considered to be at risk if you have a family history of AAA.  Lung Cancer Screening: covers low dose CT scan once per year if you meet all of the following conditions: (1) Age 55-77; (2) No signs or symptoms of lung cancer; (3) Current smoker or have quit smoking within the last 15 years; (4) You have a tobacco smoking history of at least 20 pack years (packs per day multiplied by number of years you smoked); (5) You get a written order from a healthcare provider.  Glaucoma Screening: covered annually if you're considered high risk: (1) You have diabetes OR (2) Family history of glaucoma OR (3)  aged 50 and older OR (4)  American aged 65 and older  Osteoporosis Screening: covered every 2 years if you meet one of the following conditions: (1) You're estrogen deficient and at risk for osteoporosis based off medical history and other findings; (2) Have a vertebral abnormality; (3) On glucocorticoid therapy for more than 3 months; (4) Have primary hyperparathyroidism; (5) On osteoporosis medications and need to assess response to drug therapy.   Last bone density test (DXA Scan): 05/15/2024.  HIV Screening: covered annually if you're between the age of 15-65. Also covered annually if you are younger than 15 and older than 65 with risk factors for HIV  infection. For pregnant patients, it is covered up to 3 times per pregnancy.    Immunizations:  Immunization Recommendations   Influenza Vaccine Annual influenza vaccination during flu season is recommended for all persons aged >= 6 months who do not have contraindications   Pneumococcal Vaccine   * Pneumococcal conjugate vaccine = PCV13 (Prevnar 13), PCV15 (Vaxneuvance), PCV20 (Prevnar 20)  * Pneumococcal polysaccharide vaccine = PPSV23 (Pneumovax) Adults 19-65 yo with certain risk factors or if 65+ yo  If never received any pneumonia vaccine: recommend Prevnar 20 (PCV20)  Give PCV20 if previously received 1 dose of PCV13 or PPSV23   Hepatitis B Vaccine 3 dose series if at intermediate or high risk (ex: diabetes, end stage renal disease, liver disease)   Respiratory syncytial virus (RSV) Vaccine - COVERED BY MEDICARE PART D  * RSVPreF3 (Arexvy) CDC recommends that adults 60 years of age and older may receive a single dose of RSV vaccine using shared clinical decision-making (SCDM)   Tetanus (Td) Vaccine - COST NOT COVERED BY MEDICARE PART B Following completion of primary series, a booster dose should be given every 10 years to maintain immunity against tetanus. Td may also be given as tetanus wound prophylaxis.   Tdap Vaccine - COST NOT COVERED BY MEDICARE PART B Recommended at least once for all adults. For pregnant patients, recommended with each pregnancy.   Shingles Vaccine (Shingrix) - COST NOT COVERED BY MEDICARE PART B  2 shot series recommended in those 19 years and older who have or will have weakened immune systems or those 50 years and older     Health Maintenance Due:      Topic Date Due   • Breast Cancer Screening: Mammogram  05/15/2025   • Colorectal Cancer Screening  08/23/2028   • Hepatitis C Screening  Completed     Immunizations Due:      Topic Date Due   • Pneumococcal Vaccine: 65+ Years (2 of 2 - PCV) 01/18/2022   • COVID-19 Vaccine (1 - 2023-24 season) Never done   • Influenza Vaccine (1)  09/01/2024     Advance Directives   What are advance directives?  Advance directives are legal documents that state your wishes and plans for medical care. These plans are made ahead of time in case you lose your ability to make decisions for yourself. Advance directives can apply to any medical decision, such as the treatments you want, and if you want to donate organs.   What are the types of advance directives?  There are many types of advance directives, and each state has rules about how to use them. You may choose a combination of any of the following:  Living will:  This is a written record of the treatment you want. You can also choose which treatments you do not want, which to limit, and which to stop at a certain time. This includes surgery, medicine, IV fluid, and tube feedings.   Durable power of  for healthcare (DPAHC):  This is a written record that states who you want to make healthcare choices for you when you are unable to make them for yourself. This person, called a proxy, is usually a family member or a friend. You may choose more than 1 proxy.  Do not resuscitate (DNR) order:  A DNR order is used in case your heart stops beating or you stop breathing. It is a request not to have certain forms of treatment, such as CPR. A DNR order may be included in other types of advance directives.  Medical directive:  This covers the care that you want if you are in a coma, near death, or unable to make decisions for yourself. You can list the treatments you want for each condition. Treatment may include pain medicine, surgery, blood transfusions, dialysis, IV or tube feedings, and a ventilator (breathing machine).  Values history:  This document has questions about your views, beliefs, and how you feel and think about life. This information can help others choose the care that you would choose.  Why are advance directives important?  An advance directive helps you control your care. Although spoken  wishes may be used, it is better to have your wishes written down. Spoken wishes can be misunderstood, or not followed. Treatments may be given even if you do not want them. An advance directive may make it easier for your family to make difficult choices about your care.   Weight Management   Why it is important to manage your weight:  Being overweight increases your risk of health conditions such as heart disease, high blood pressure, type 2 diabetes, and certain types of cancer. It can also increase your risk for osteoarthritis, sleep apnea, and other respiratory problems. Aim for a slow, steady weight loss. Even a small amount of weight loss can lower your risk of health problems.  How to lose weight safely:  A safe and healthy way to lose weight is to eat fewer calories and get regular exercise. You can lose up about 1 pound a week by decreasing the number of calories you eat by 500 calories each day.   Healthy meal plan for weight management:  A healthy meal plan includes a variety of foods, contains fewer calories, and helps you stay healthy. A healthy meal plan includes the following:  Eat whole-grain foods more often.  A healthy meal plan should contain fiber. Fiber is the part of grains, fruits, and vegetables that is not broken down by your body. Whole-grain foods are healthy and provide extra fiber in your diet. Some examples of whole-grain foods are whole-wheat breads and pastas, oatmeal, brown rice, and bulgur.  Eat a variety of vegetables every day.  Include dark, leafy greens such as spinach, kale, ruslan greens, and mustard greens. Eat yellow and orange vegetables such as carrots, sweet potatoes, and winter squash.   Eat a variety of fruits every day.  Choose fresh or canned fruit (canned in its own juice or light syrup) instead of juice. Fruit juice has very little or no fiber.  Eat low-fat dairy foods.  Drink fat-free (skim) milk or 1% milk. Eat fat-free yogurt and low-fat cottage cheese. Try  low-fat cheeses such as mozzarella and other reduced-fat cheeses.  Choose meat and other protein foods that are low in fat.  Choose beans or other legumes such as split peas or lentils. Choose fish, skinless poultry (chicken or turkey), or lean cuts of red meat (beef or pork). Before you cook meat or poultry, cut off any visible fat.   Use less fat and oil.  Try baking foods instead of frying them. Add less fat, such as margarine, sour cream, regular salad dressing and mayonnaise to foods. Eat fewer high-fat foods. Some examples of high-fat foods include french fries, doughnuts, ice cream, and cakes.  Eat fewer sweets.  Limit foods and drinks that are high in sugar. This includes candy, cookies, regular soda, and sweetened drinks.  Exercise:  Exercise at least 30 minutes per day on most days of the week. Some examples of exercise include walking, biking, dancing, and swimming. You can also fit in more physical activity by taking the stairs instead of the elevator or parking farther away from stores. Ask your healthcare provider about the best exercise plan for you.      © Copyright [employer] 2018 Information is for End User's use only and may not be sold, redistributed or otherwise used for commercial purposes. All illustrations and images included in CareNotes® are the copyrighted property of A.D.A[employer]M., Inc[employer] or [employer]

## 2024-08-01 ENCOUNTER — HOSPITAL ENCOUNTER (OUTPATIENT)
Dept: NON INVASIVE DIAGNOSTICS | Facility: HOSPITAL | Age: 69
Discharge: HOME | End: 2024-08-01
Payer: COMMERCIAL

## 2024-08-01 VITALS
BODY MASS INDEX: 25.1 KG/M2 | SYSTOLIC BLOOD PRESSURE: 120 MMHG | DIASTOLIC BLOOD PRESSURE: 78 MMHG | HEART RATE: 75 BPM | HEIGHT: 64 IN | WEIGHT: 147 LBS

## 2024-08-01 DIAGNOSIS — R01.1 NEWLY RECOGNIZED HEART MURMUR: ICD-10-CM

## 2024-08-01 LAB
AORTIC ROOT: 3 CM
APICAL FOUR CHAMBER EJECTION FRACTION: 69 %
ASCENDING AORTA: 3.3 CM
AV LVOT MEAN GRADIENT: 3 MMHG
AV LVOT PEAK GRADIENT: 6 MMHG
AV REGURGITATION PRESSURE HALF TIME: 471 MS
BSA FOR ECHO PROCEDURE: 1.72 M2
DOP CALC LVOT PEAK VEL VTI: 28.45 CM
DOP CALC LVOT PEAK VEL: 1.2 M/S
E WAVE DECELERATION TIME: 170 MS
E/A RATIO: 0.7
FRACTIONAL SHORTENING: 33 (ref 28–44)
INTERVENTRICULAR SEPTUM IN DIASTOLE (PARASTERNAL SHORT AXIS VIEW): 1 CM
INTERVENTRICULAR SEPTUM: 1 CM (ref 0.6–1.1)
LAAS-AP2: 14.7 CM2
LAAS-AP4: 10.7 CM2
LEFT ATRIUM AREA SYSTOLE SINGLE PLANE A4C: 10.5 CM2
LEFT ATRIUM SIZE: 2.9 CM
LEFT ATRIUM VOLUME (MOD BIPLANE): 30 ML
LEFT ATRIUM VOLUME INDEX (MOD BIPLANE): 17.4 ML/M2
LEFT INTERNAL DIMENSION IN SYSTOLE: 2.7 CM (ref 2.1–4)
LEFT VENTRICULAR INTERNAL DIMENSION IN DIASTOLE: 4 CM (ref 3.5–6)
LEFT VENTRICULAR POSTERIOR WALL IN END DIASTOLE: 0.9 CM
LEFT VENTRICULAR STROKE VOLUME: 43 ML
LVSV (TEICH): 43 ML
MITRAL REGURGITATION PEAK VELOCITY: 5.01 M/S
MITRAL VALVE MEAN INFLOW VELOCITY: 4.11 M/S
MITRAL VALVE REGURGITANT PEAK GRADIENT: 100 MMHG
MV E'TISSUE VEL-SEP: 9 CM/S
MV PEAK A VEL: 0.87 M/S
MV PEAK E VEL: 61 CM/S
MV STENOSIS PRESSURE HALF TIME: 49 MS
MV VALVE AREA P 1/2 METHOD: 4.49
RIGHT ATRIAL 2D VOLUME: 35 ML
RIGHT ATRIUM AREA SYSTOLE A4C: 13.1 CM2
RIGHT VENTRICLE ID DIMENSION: 3.4 CM
SL CV AV DECELERATION TIME RETROGRADE: 1623 MS
SL CV AV PEAK GRADIENT RETROGRADE: 54 MMHG
SL CV DOP CALC MV VTI RETROGRADE: 197.2 CM
SL CV LEFT ATRIUM LENGTH A2C: 4.9 CM
SL CV LV EF: 55
SL CV MV MEAN GRADIENT RETROGRADE: 72 MMHG
SL CV PED ECHO LEFT VENTRICLE DIASTOLIC VOLUME (MOD BIPLANE) 2D: 71 ML
SL CV PED ECHO LEFT VENTRICLE SYSTOLIC VOLUME (MOD BIPLANE) 2D: 28 ML
TR MAX PG: 27 MMHG
TR PEAK VELOCITY: 2.6 M/S
TRICUSPID ANNULAR PLANE SYSTOLIC EXCURSION: 2.5 CM
TRICUSPID VALVE PEAK REGURGITATION VELOCITY: 2.61 M/S

## 2024-08-01 PROCEDURE — 93306 TTE W/DOPPLER COMPLETE: CPT | Performed by: INTERNAL MEDICINE

## 2024-08-01 PROCEDURE — 93306 TTE W/DOPPLER COMPLETE: CPT

## 2024-08-08 ENCOUNTER — TELEPHONE (OUTPATIENT)
Dept: FAMILY MEDICINE CLINIC | Facility: CLINIC | Age: 69
End: 2024-08-08

## 2024-08-08 NOTE — TELEPHONE ENCOUNTER
----- Message from Iesha Duncan MD sent at 8/1/2024  2:00 PM EDT -----  There is calcium deposit on both aortic and mitral valve with mild leaking  causing murmur  no treatment needed heart function is very good

## 2024-08-16 PROBLEM — Z00.00 MEDICARE ANNUAL WELLNESS VISIT, SUBSEQUENT: Status: RESOLVED | Noted: 2020-08-13 | Resolved: 2024-08-16

## 2024-10-16 DIAGNOSIS — I10 ESSENTIAL HYPERTENSION: ICD-10-CM

## 2024-10-16 RX ORDER — LOSARTAN POTASSIUM AND HYDROCHLOROTHIAZIDE 12.5; 1 MG/1; MG/1
1 TABLET ORAL DAILY
Qty: 90 TABLET | Refills: 0 | Status: SHIPPED | OUTPATIENT
Start: 2024-10-16

## 2024-10-24 DIAGNOSIS — E78.5 HYPERLIPIDEMIA, UNSPECIFIED HYPERLIPIDEMIA TYPE: ICD-10-CM

## 2024-10-24 RX ORDER — ATORVASTATIN CALCIUM 10 MG/1
TABLET, FILM COATED ORAL
Qty: 30 TABLET | Refills: 0 | Status: SHIPPED | OUTPATIENT
Start: 2024-10-24

## 2024-11-16 DIAGNOSIS — E78.5 HYPERLIPIDEMIA, UNSPECIFIED HYPERLIPIDEMIA TYPE: ICD-10-CM

## 2024-11-18 DIAGNOSIS — B37.9 YEAST INFECTION: Primary | ICD-10-CM

## 2024-11-18 NOTE — TELEPHONE ENCOUNTER
Refill must be reviewed and completed by the office or provider. The refill is unable to be approved or denied by the medication management team.     Courtesy refill previously given.   Patient needs lipid bw

## 2024-11-18 NOTE — TELEPHONE ENCOUNTER
Patient calling stated that she has been having yeast inf since August after getting a ABX treatment. She stated its been on and off. Patient stated that she did the insert 1 day treatment OTC and its not working and she stated that it seem to be getting worse. Patient asking how soon can she use another OTC treatment for 7 days or any other recommendation the doctor may have.  Luda Crump

## 2024-11-19 RX ORDER — FLUCONAZOLE 150 MG/1
150 TABLET ORAL DAILY
Qty: 3 TABLET | Refills: 0 | Status: SHIPPED | OUTPATIENT
Start: 2024-11-19 | End: 2024-11-22

## 2024-11-19 RX ORDER — ATORVASTATIN CALCIUM 10 MG/1
10 TABLET, FILM COATED ORAL DAILY
Qty: 30 TABLET | Refills: 0 | Status: SHIPPED | OUTPATIENT
Start: 2024-11-19

## 2024-12-10 DIAGNOSIS — E78.5 HYPERLIPIDEMIA, UNSPECIFIED HYPERLIPIDEMIA TYPE: ICD-10-CM

## 2024-12-11 RX ORDER — ATORVASTATIN CALCIUM 10 MG/1
10 TABLET, FILM COATED ORAL DAILY
Qty: 30 TABLET | Refills: 11 | Status: SHIPPED | OUTPATIENT
Start: 2024-12-11

## 2024-12-11 NOTE — TELEPHONE ENCOUNTER
Courtesy refill previously given 11.18.2024. Patient need updated blood work.  Please review to see if the refill is appropriate.

## 2024-12-28 DIAGNOSIS — I10 ESSENTIAL HYPERTENSION: ICD-10-CM

## 2025-01-08 ENCOUNTER — OFFICE VISIT (OUTPATIENT)
Dept: FAMILY MEDICINE CLINIC | Facility: CLINIC | Age: 70
End: 2025-01-08
Payer: COMMERCIAL

## 2025-01-08 VITALS
DIASTOLIC BLOOD PRESSURE: 70 MMHG | OXYGEN SATURATION: 97 % | BODY MASS INDEX: 26.63 KG/M2 | HEIGHT: 64 IN | WEIGHT: 156 LBS | SYSTOLIC BLOOD PRESSURE: 136 MMHG | RESPIRATION RATE: 16 BRPM | HEART RATE: 68 BPM | TEMPERATURE: 97.5 F

## 2025-01-08 DIAGNOSIS — J06.9 ACUTE URI: ICD-10-CM

## 2025-01-08 DIAGNOSIS — I34.0 NONRHEUMATIC MITRAL VALVE REGURGITATION: ICD-10-CM

## 2025-01-08 DIAGNOSIS — I35.1 NONRHEUMATIC AORTIC VALVE INSUFFICIENCY: Primary | ICD-10-CM

## 2025-01-08 DIAGNOSIS — I10 ESSENTIAL HYPERTENSION: ICD-10-CM

## 2025-01-08 PROBLEM — R01.1 NEWLY RECOGNIZED HEART MURMUR: Status: RESOLVED | Noted: 2024-07-17 | Resolved: 2025-01-08

## 2025-01-08 PROCEDURE — 99213 OFFICE O/P EST LOW 20 MIN: CPT | Performed by: FAMILY MEDICINE

## 2025-01-08 NOTE — PROGRESS NOTES
"Name: Melony Donovan      : 1955      MRN: [id number]  Encounter Provider: Iesha Duncan MD  Encounter Date: 2025   Encounter department: [facility] MEDICINE  :  Assessment & Plan  Nonrheumatic aortic valve insufficiency  Mild recent echo with sclerosis followed by cardiologist       Nonrheumatic mitral valve regurgitation  Trace to mild        Essential hypertension  Well controlled on current therapy continue with current medications and will reassess next visit         Acute URI  Now resolved pt wanted lungs checked  nl exam               History of Present Illness      Pt had cold 2 weeks ago now symptoms resolved but wanted to have lungs checked to  make sure she had no \"wheezing\" pt with no cough no congestion feels ok  now  pt wanted echo results reviewed as well       Review of Systems   Constitutional:  Negative for chills and fever.   HENT:  Negative for congestion, ear pain, mouth sores, rhinorrhea, sinus pressure, sinus pain, sore throat and trouble swallowing.    Eyes:  Negative for discharge.   Respiratory:  Negative for cough, chest tightness and shortness of breath.    Cardiovascular:  Negative for chest pain.   Musculoskeletal:  Negative for arthralgias and myalgias.   Neurological:  Negative for headaches.       Objective   /70   Pulse 68   Temp 97.5 °F (36.4 °C) (Tympanic)   Resp 16   Ht 5' 4\" (1.626 m)   Wt 70.8 kg (156 lb)   SpO2 97%   BMI 26.78 kg/m²      Physical Exam  Vitals and nursing note reviewed.   Constitutional:       General: She is not in acute distress.     Appearance: Normal appearance. She is well-developed. She is not ill-appearing.   HENT:      Head: Normocephalic.      Right Ear: Tympanic membrane normal. There is no impacted cerumen.      Left Ear: Tympanic membrane normal.      Nose: No mucosal edema, congestion or rhinorrhea.      Right Sinus: No maxillary sinus tenderness or frontal sinus tenderness.      Left Sinus: No " maxillary sinus tenderness or frontal sinus tenderness.      Mouth/Throat:      Mouth: Mucous membranes are moist.      Pharynx: Oropharynx is clear. No oropharyngeal exudate or posterior oropharyngeal erythema.   Eyes:      Extraocular Movements: Extraocular movements intact.      Conjunctiva/sclera: Conjunctivae normal.      Pupils: Pupils are equal, round, and reactive to light.   Cardiovascular:      Rate and Rhythm: Normal rate and regular rhythm.      Heart sounds: Murmur (2/6 systolic) heard.   Pulmonary:      Effort: Pulmonary effort is normal. No respiratory distress.      Breath sounds: Normal breath sounds. No wheezing, rhonchi or rales.   Musculoskeletal:      Cervical back: Normal range of motion and neck supple.   Lymphadenopathy:      Cervical: No cervical adenopathy.   Neurological:      General: No focal deficit present.      Mental Status: She is alert and oriented to person, place, and time.   Psychiatric:         Mood and Affect: Mood normal.

## 2025-01-15 PROBLEM — M79.659 THIGH PAIN, MUSCULOSKELETAL: Status: RESOLVED | Noted: 2021-10-11 | Resolved: 2025-01-15

## 2025-01-15 PROBLEM — J06.9 ACUTE URI: Status: RESOLVED | Noted: 2025-01-08 | Resolved: 2025-01-15

## 2025-01-15 PROBLEM — I35.8 AORTIC VALVE SCLEROSIS: Status: ACTIVE | Noted: 2025-01-15

## 2025-01-15 NOTE — PROGRESS NOTES
Name: Melony Donovan      : 1955      MRN: [id number]  Encounter Provider: Iesha Duncan MD  Encounter Date: 2025   Encounter department: [facility]  :  Assessment & Plan  Essential hypertension  Well controlled on current therapy continue with current medications and will reassess next visit    Orders:  •  Comprehensive metabolic panel; Future    Dyslipidemia  Due for lipid  Orders:  •  Lipid panel; Future    Osteopenia of multiple sites  Vit d calcium and exercise       Generalized anxiety disorder  Ok on xanax prn       Nonrheumatic mitral valve regurgitation  Mild on echo        Fatty liver  Check lfts       Vitamin D deficiency  Cont  vit d supplement       Nonrheumatic aortic valve insufficiency  Mild on echo        Aortic valve sclerosis  Mild AI  echo        Anxiety state  Xanax as neededn  Orders:  •  ALPRAZolam (XANAX) 0.25 mg tablet; Take 1 tablet (0.25 mg total) by mouth daily at bedtime as needed for anxiety           History of Present Illness     Pt is here for interval visit and evaluation of multiple medical problems, review of medications, labs, Health Maintenance and any recent specialty consults        Review of Systems   Constitutional:  Negative for appetite change, chills, fatigue and fever.   Respiratory:  Negative for cough, chest tightness and shortness of breath.    Cardiovascular:  Negative for chest pain, palpitations and leg swelling.   Gastrointestinal:  Negative for abdominal pain, constipation, diarrhea, nausea and vomiting.   Genitourinary:  Negative for difficulty urinating and frequency.   Musculoskeletal:  Negative for arthralgias, back pain, gait problem and neck pain.   Skin:  Negative for rash.   Neurological:  Negative for dizziness, weakness, light-headedness, numbness and headaches.   Hematological:  Does not bruise/bleed easily.   Psychiatric/Behavioral:  Negative for dysphoric mood and sleep disturbance. The  "patient is not nervous/anxious.        Objective   /62   Pulse 70   Temp 98 °F (36.7 °C) (Tympanic)   Resp 16   Ht 5' 4\" (1.626 m)   Wt 71.7 kg (158 lb)   SpO2 97%   BMI 27.12 kg/m²      Physical Exam  Constitutional:       General: She is not in acute distress.     Appearance: Normal appearance. She is normal weight.   Neck:      Thyroid: No thyromegaly.      Vascular: No carotid bruit.   Cardiovascular:      Rate and Rhythm: Normal rate and regular rhythm.      Heart sounds: Murmur (2/6 systolic) heard.   Pulmonary:      Effort: Pulmonary effort is normal. No respiratory distress.      Breath sounds: Normal breath sounds. No wheezing, rhonchi or rales.   Abdominal:      Palpations: Abdomen is soft.      Tenderness: There is no abdominal tenderness.   Musculoskeletal:      Cervical back: Normal range of motion and neck supple.      Right lower leg: No edema.      Left lower leg: No edema.   Skin:     Findings: No rash.   Neurological:      General: No focal deficit present.      Mental Status: She is alert and oriented to person, place, and time. Mental status is at baseline.      Gait: Gait normal.   Psychiatric:         Mood and Affect: Mood normal.         Behavior: Behavior normal.         "

## 2025-01-21 ENCOUNTER — OFFICE VISIT (OUTPATIENT)
Dept: FAMILY MEDICINE CLINIC | Facility: CLINIC | Age: 70
End: 2025-01-21
Payer: COMMERCIAL

## 2025-01-21 VITALS
SYSTOLIC BLOOD PRESSURE: 130 MMHG | HEART RATE: 70 BPM | BODY MASS INDEX: 26.98 KG/M2 | WEIGHT: 158 LBS | OXYGEN SATURATION: 97 % | HEIGHT: 64 IN | TEMPERATURE: 98 F | RESPIRATION RATE: 16 BRPM | DIASTOLIC BLOOD PRESSURE: 62 MMHG

## 2025-01-21 DIAGNOSIS — M85.89 OSTEOPENIA OF MULTIPLE SITES: ICD-10-CM

## 2025-01-21 DIAGNOSIS — F41.1 ANXIETY STATE: ICD-10-CM

## 2025-01-21 DIAGNOSIS — I34.0 NONRHEUMATIC MITRAL VALVE REGURGITATION: ICD-10-CM

## 2025-01-21 DIAGNOSIS — I35.8 AORTIC VALVE SCLEROSIS: ICD-10-CM

## 2025-01-21 DIAGNOSIS — I10 ESSENTIAL HYPERTENSION: Primary | ICD-10-CM

## 2025-01-21 DIAGNOSIS — F41.1 GENERALIZED ANXIETY DISORDER: ICD-10-CM

## 2025-01-21 DIAGNOSIS — E55.9 VITAMIN D DEFICIENCY: ICD-10-CM

## 2025-01-21 DIAGNOSIS — I35.1 NONRHEUMATIC AORTIC VALVE INSUFFICIENCY: ICD-10-CM

## 2025-01-21 DIAGNOSIS — K76.0 FATTY LIVER: ICD-10-CM

## 2025-01-21 DIAGNOSIS — E78.5 DYSLIPIDEMIA: ICD-10-CM

## 2025-01-21 PROCEDURE — 99214 OFFICE O/P EST MOD 30 MIN: CPT | Performed by: FAMILY MEDICINE

## 2025-01-21 PROCEDURE — G2211 COMPLEX E/M VISIT ADD ON: HCPCS | Performed by: FAMILY MEDICINE

## 2025-01-21 RX ORDER — ALPRAZOLAM 0.25 MG/1
0.25 TABLET ORAL
Qty: 30 TABLET | Refills: 0 | Status: SHIPPED | OUTPATIENT
Start: 2025-01-21

## 2025-02-04 DIAGNOSIS — I10 ESSENTIAL HYPERTENSION: ICD-10-CM

## 2025-02-04 DIAGNOSIS — E78.5 HYPERLIPIDEMIA, UNSPECIFIED HYPERLIPIDEMIA TYPE: ICD-10-CM

## 2025-02-05 RX ORDER — CARVEDILOL 12.5 MG/1
12.5 TABLET ORAL 2 TIMES DAILY WITH MEALS
Qty: 180 TABLET | Refills: 1 | Status: SHIPPED | OUTPATIENT
Start: 2025-02-05 | End: 2025-02-06 | Stop reason: SDUPTHER

## 2025-02-06 DIAGNOSIS — I10 ESSENTIAL HYPERTENSION: ICD-10-CM

## 2025-02-06 RX ORDER — ATORVASTATIN CALCIUM 10 MG/1
10 TABLET, FILM COATED ORAL DAILY
Qty: 90 TABLET | Refills: 0 | Status: SHIPPED | OUTPATIENT
Start: 2025-02-06

## 2025-02-06 RX ORDER — LOSARTAN POTASSIUM AND HYDROCHLOROTHIAZIDE 12.5; 1 MG/1; MG/1
1 TABLET ORAL DAILY
Qty: 90 TABLET | Refills: 3 | Status: SHIPPED | OUTPATIENT
Start: 2025-02-06 | End: 2025-02-06 | Stop reason: SDUPTHER

## 2025-02-06 RX ORDER — CARVEDILOL 12.5 MG/1
12.5 TABLET ORAL 2 TIMES DAILY WITH MEALS
Qty: 180 TABLET | Refills: 0 | Status: SHIPPED | OUTPATIENT
Start: 2025-02-06

## 2025-02-06 NOTE — TELEPHONE ENCOUNTER
Reason for call:   [x] Refill   [] Prior Auth  [] Other: not a duplicate, was sent to the wrong pharmacy      Office:   [] PCP/Provider -   [x] Specialty/Provider - cardio    Medication: losartan- hydrochlorothiazide     Dose/Frequency: 100-12.5 mg take 1 tablet daily     Quantity: 90    Pharmacy: [facility] mail order    Does the patient have enough for 3 days?   [x] Yes   [] No - Send as HP to POD

## 2025-02-06 NOTE — TELEPHONE ENCOUNTER
Carvedilol was sent to incorrect pharmacy, patient would also like to add a medication to request as well.      Reason for call:   [x] Refill   [] Prior Auth  [] Other:     Office:   [x] PCP/Provider - Iesha Duncan MD   [] Specialty/Provider -     Medication:     atorvastatin (LIPITOR) 10 mg tablet       carvedilol (COREG) 12.5 mg tablet       Dose/Frequency:     10 mg, Oral, Daily       12.5 mg, Oral, 2 times daily with meals       Quantity:   90  180  Pharmacy: [facility] - [address]     Does the patient have enough for 3 days?   [x] Yes   [] No - Send as HP to POD

## 2025-02-07 RX ORDER — LOSARTAN POTASSIUM AND HYDROCHLOROTHIAZIDE 12.5; 1 MG/1; MG/1
1 TABLET ORAL DAILY
Qty: 90 TABLET | Refills: 1 | Status: SHIPPED | OUTPATIENT
Start: 2025-02-07

## 2025-03-01 ENCOUNTER — APPOINTMENT (EMERGENCY)
Dept: RADIOLOGY | Facility: HOSPITAL | Age: 70
End: 2025-03-01
Payer: COMMERCIAL

## 2025-03-01 ENCOUNTER — HOSPITAL ENCOUNTER (EMERGENCY)
Facility: HOSPITAL | Age: 70
Discharge: HOME | End: 2025-03-01
Attending: EMERGENCY MEDICINE | Admitting: EMERGENCY MEDICINE
Payer: COMMERCIAL

## 2025-03-01 VITALS
RESPIRATION RATE: 20 BRPM | HEART RATE: 65 BPM | OXYGEN SATURATION: 96 % | SYSTOLIC BLOOD PRESSURE: 161 MMHG | DIASTOLIC BLOOD PRESSURE: 76 MMHG | WEIGHT: 154 LBS | BODY MASS INDEX: 26.43 KG/M2 | TEMPERATURE: 98.4 F

## 2025-03-01 DIAGNOSIS — R07.9 CHEST PAIN: Primary | ICD-10-CM

## 2025-03-01 LAB
ALBUMIN SERPL BCG-MCNC: 4.5 G/DL (ref 3.5–5)
ALP SERPL-CCNC: 47 U/L (ref 34–104)
ALT SERPL W P-5'-P-CCNC: 20 U/L (ref 7–52)
ANION GAP SERPL CALCULATED.3IONS-SCNC: 11 MMOL/L (ref 4–13)
AST SERPL W P-5'-P-CCNC: 20 U/L (ref 13–39)
ATRIAL RATE: 67 BPM
ATRIAL RATE: 73 BPM
BASOPHILS # BLD AUTO: 0.03 THOUSANDS/ÂΜL (ref 0–0.1)
BASOPHILS NFR BLD AUTO: 1 % (ref 0–1)
BILIRUB SERPL-MCNC: 0.68 MG/DL (ref 0.2–1)
BUN SERPL-MCNC: 16 MG/DL (ref 5–25)
CALCIUM SERPL-MCNC: 9.5 MG/DL (ref 8.4–10.2)
CARDIAC TROPONIN I PNL SERPL HS: 3 NG/L (ref ?–50)
CHLORIDE SERPL-SCNC: 99 MMOL/L (ref 96–108)
CO2 SERPL-SCNC: 26 MMOL/L (ref 21–32)
CREAT SERPL-MCNC: 0.74 MG/DL (ref 0.6–1.3)
EOSINOPHIL # BLD AUTO: 0.12 THOUSAND/ÂΜL (ref 0–0.61)
EOSINOPHIL NFR BLD AUTO: 2 % (ref 0–6)
ERYTHROCYTE [DISTWIDTH] IN BLOOD BY AUTOMATED COUNT: 12.7 % (ref 11.6–15.1)
GFR SERPL CREATININE-BSD FRML MDRD: 82 ML/MIN/1.73SQ M
GLUCOSE SERPL-MCNC: 108 MG/DL (ref 65–140)
HCT VFR BLD AUTO: 42.8 % (ref 34.8–46.1)
HGB BLD-MCNC: 14.3 G/DL (ref 11.5–15.4)
IMM GRANULOCYTES # BLD AUTO: 0.01 THOUSAND/UL (ref 0–0.2)
IMM GRANULOCYTES NFR BLD AUTO: 0 % (ref 0–2)
LYMPHOCYTES # BLD AUTO: 1.91 THOUSANDS/ÂΜL (ref 0.6–4.47)
LYMPHOCYTES NFR BLD AUTO: 37 % (ref 14–44)
MCH RBC QN AUTO: 30.4 PG (ref 26.8–34.3)
MCHC RBC AUTO-ENTMCNC: 33.4 G/DL (ref 31.4–37.4)
MCV RBC AUTO: 91 FL (ref 82–98)
MONOCYTES # BLD AUTO: 0.32 THOUSAND/ÂΜL (ref 0.17–1.22)
MONOCYTES NFR BLD AUTO: 6 % (ref 4–12)
NEUTROPHILS # BLD AUTO: 2.82 THOUSANDS/ÂΜL (ref 1.85–7.62)
NEUTS SEG NFR BLD AUTO: 54 % (ref 43–75)
NRBC BLD AUTO-RTO: 0 /100 WBCS
P AXIS: 18 DEGREES
P AXIS: 57 DEGREES
PLATELET # BLD AUTO: 209 THOUSANDS/UL (ref 149–390)
PMV BLD AUTO: 9.4 FL (ref 8.9–12.7)
POTASSIUM SERPL-SCNC: 3.9 MMOL/L (ref 3.5–5.3)
PR INTERVAL: 204 MS
PR INTERVAL: 216 MS
PROT SERPL-MCNC: 7.5 G/DL (ref 6.4–8.4)
QRS AXIS: 1 DEGREES
QRS AXIS: 13 DEGREES
QRSD INTERVAL: 68 MS
QRSD INTERVAL: 70 MS
QT INTERVAL: 378 MS
QT INTERVAL: 384 MS
QTC INTERVAL: 399 MS
QTC INTERVAL: 423 MS
RBC # BLD AUTO: 4.7 MILLION/UL (ref 3.81–5.12)
SODIUM SERPL-SCNC: 136 MMOL/L (ref 135–147)
T WAVE AXIS: 20 DEGREES
T WAVE AXIS: 21 DEGREES
VENTRICULAR RATE: 67 BPM
VENTRICULAR RATE: 73 BPM
WBC # BLD AUTO: 5.21 THOUSAND/UL (ref 4.31–10.16)

## 2025-03-01 PROCEDURE — 36415 COLL VENOUS BLD VENIPUNCTURE: CPT | Performed by: EMERGENCY MEDICINE

## 2025-03-01 PROCEDURE — 84484 ASSAY OF TROPONIN QUANT: CPT | Performed by: EMERGENCY MEDICINE

## 2025-03-01 PROCEDURE — 71045 X-RAY EXAM CHEST 1 VIEW: CPT

## 2025-03-01 PROCEDURE — 85025 COMPLETE CBC W/AUTO DIFF WBC: CPT | Performed by: EMERGENCY MEDICINE

## 2025-03-01 PROCEDURE — 80053 COMPREHEN METABOLIC PANEL: CPT | Performed by: EMERGENCY MEDICINE

## 2025-03-01 PROCEDURE — 99285 EMERGENCY DEPT VISIT HI MDM: CPT

## 2025-03-01 PROCEDURE — 99285 EMERGENCY DEPT VISIT HI MDM: CPT | Performed by: EMERGENCY MEDICINE

## 2025-03-01 PROCEDURE — 93005 ELECTROCARDIOGRAM TRACING: CPT

## 2025-03-01 NOTE — ED PROVIDER NOTES
Time reflects when diagnosis was documented in both MDM as applicable and the Disposition within this note       Time User Action Codes Description Comment    3/1/2025  1:49 PM Pancho Taylor Add [R07.9] Chest pain           ED Disposition       ED Disposition   Discharge    Condition   Stable    Date/Time   Sat Mar 1, 2025  1:49 PM    Comment   Melony Donovan discharge to home/self care.                   Assessment & Plan       Medical Decision Making  Differential diagnosis includes but is not limited to musculoskeletal pain, ACS, GERD, pneumonia, pneumothorax.  I ordered and reviewed lab work including CBC, CMP, troponin.  I ordered and independently interpreted an EKG which demonstrates normal sinus rhythm rate of 67 with a first-degree AV block, T wave inversions in V2, V3,, lead III. I am unable to view any recent EKGs for comparison.  Patient well-appearing on reassessment.  Heart score of 5.  Provided with referral to cardiology, discharged with return precautions.    Amount and/or Complexity of Data Reviewed  Labs: ordered.  Radiology: ordered.             Medications - No data to display    ED Risk Strat Scores   HEART Risk Score      Flowsheet Row Most Recent Value   Heart Score Risk Calculator    History 0 Filed at: 03/01/2025 1348   ECG 1 Filed at: 03/01/2025 1348   Age 2 Filed at: 03/01/2025 1348   Risk Factors 2 Filed at: 03/01/2025 1348   Troponin 0 Filed at: 03/01/2025 1348   HEART Score 5 Filed at: 03/01/2025 1348          HEART Risk Score      Flowsheet Row Most Recent Value   Heart Score Risk Calculator    History 0 Filed at: 03/01/2025 1348   ECG 1 Filed at: 03/01/2025 1348   Age 2 Filed at: 03/01/2025 1348   Risk Factors 2 Filed at: 03/01/2025 1348   Troponin 0 Filed at: 03/01/2025 1348   HEART Score 5 Filed at: 03/01/2025 1348                                                  History of Present Illness       Chief Complaint   Patient presents with    Chest Pain     Patient c/o  intermittent spasms to her left upper chest over the past few weeks.  Denies at present.       Past Medical History:   Diagnosis Date    Anxiety     Diverticulitis of colon     Fatty liver 6/17/2021    Glaucoma     H/O cardiovascular stress test 08/16/2021    Dr. Echavarria    Hypercholesteremia     Hypertension     Osteopenia       Past Surgical History:   Procedure Laterality Date    BREAST CYST EXCISION Right     benign - 2000 approx.    COLONOSCOPY  2016    FOOT SURGERY Right     removal of cat tooth removed    MAMMO (HISTORICAL)  12/14/2018,11/10/17    TONSILLECTOMY      TOOTH EXTRACTION        Family History   Problem Relation Age of Onset    Hyperlipidemia Mother     Hypertension Mother     Other Mother         cerebrovascular accident    Stroke Mother     Lung cancer Father 54    No Known Problems Maternal Grandmother     Heart disease Maternal Grandfather     Early death Maternal Grandfather 38        massive MI-congenital defect?    No Known Problems Paternal Grandmother     No Known Problems Paternal Grandfather     No Known Problems Brother     No Known Problems Brother     No Known Problems Maternal Aunt     No Known Problems Maternal Aunt     Breast cancer Paternal Aunt         later in life    Bone cancer Cousin     ADD / ADHD Neg Hx     Anesthesia problems Neg Hx     Cancer Neg Hx     Clotting disorder Neg Hx     Collagen disease Neg Hx     Diabetes Neg Hx     Dislocations Neg Hx     Learning disabilities Neg Hx     Neurological problems Neg Hx     Osteoporosis Neg Hx     Rheumatologic disease Neg Hx     Scoliosis Neg Hx     Vascular Disease Neg Hx       Social History     Tobacco Use    Smoking status: Never    Smokeless tobacco: Never   Vaping Use    Vaping status: Never Used   Substance Use Topics    Alcohol use: Yes    Drug use: Never      E-Cigarette/Vaping    E-Cigarette Use Never User       E-Cigarette/Vaping Substances    Nicotine No     THC No     CBD No     Flavoring No     Other No     Unknown  No       I have reviewed and agree with the history as documented.     Patient is a 69-year-old female history of hypertension, hyperlipidemia, anxiety presenting for evaluation of pressure in the left side of the chest.  Patient states symptoms intermittent over the course of the last few weeks, feels like a pressure, typically last for few minutes.  Patient states that she notices the symptoms more when she is lying in the bed.  Patient denies exertional component.  Patient denies associated shortness of breath.  Patient states over the last few days she has had some rhinorrhea, low-grade fever, and a nonproductive cough but states that the chest pressure well preceded this.  Patient states that she had a similar episode a few years ago with a negative workup.        Review of Systems   Constitutional:  Negative for chills, fatigue and fever.   Respiratory:  Negative for cough and shortness of breath.    Cardiovascular:  Positive for chest pain.   Gastrointestinal:  Negative for diarrhea, nausea and vomiting.   Musculoskeletal:  Negative for arthralgias and myalgias.   Neurological:  Negative for headaches.   All other systems reviewed and are negative.          Objective       ED Triage Vitals [03/01/25 1151]   Temperature Pulse Blood Pressure Respirations SpO2 Patient Position - Orthostatic VS   98.4 °F (36.9 °C) 77 (!) 192/81 17 96 % Sitting      Temp Source Heart Rate Source BP Location FiO2 (%) Pain Score    Tympanic Monitor Right arm -- No Pain      Vitals      Date and Time Temp Pulse SpO2 Resp BP Pain Score FACES Pain Rating User   03/01/25 1245 -- 68 97 % 20 -- -- --    03/01/25 1230 -- 67 95 % 18 164/82 -- --    03/01/25 1215 -- 73 96 % 20 -- -- --    03/01/25 1151 98.4 °F (36.9 °C) 77 96 % 17 192/81 No Pain -- AC            Physical Exam  Vitals and nursing note reviewed.   Constitutional:       General: She is not in acute distress.     Appearance: Normal appearance. She is not ill-appearing,  toxic-appearing or diaphoretic.      Comments: Well-appearing, nontoxic nondistressed   HENT:      Head: Normocephalic and atraumatic.      Comments: Moist mucous membranes     Right Ear: External ear normal.      Left Ear: External ear normal.   Eyes:      General:         Right eye: No discharge.         Left eye: No discharge.   Cardiovascular:      Comments: Regular rate and rhythm, no murmurs rubs or gallops  Pulmonary:      Effort: No respiratory distress.      Comments: No increased work of breathing.  Speaking in complete sentences.  Lungs clear to auscultation bilaterally without wheezes, rales, rhonchi.  Satting 96% on room air indicating adequate oxygenation  Chest:      Comments: Normal-appearing chest wall.  Chest nontender.  Abdominal:      General: There is no distension.   Musculoskeletal:         General: No deformity.      Cervical back: Normal range of motion.   Skin:     Findings: No lesion or rash.   Neurological:      Mental Status: She is alert and oriented to person, place, and time. Mental status is at baseline.      Comments: Awake, alert, pleasant, interactive   Psychiatric:         Mood and Affect: Mood and affect normal.         Results Reviewed       Procedure Component Value Units Date/Time    HS Troponin 0hr (reflex protocol) [[id number]]  (Normal) Collected: 03/01/25 1254    Lab Status: Final result Specimen: Blood from Arm, Right Updated: 03/01/25 1330     hs TnI 0hr 3 ng/L     Comprehensive metabolic panel [[id number]] Collected: 03/01/25 1254    Lab Status: Final result Specimen: Blood from Arm, Right Updated: 03/01/25 1322     Sodium 136 mmol/L      Potassium 3.9 mmol/L      Chloride 99 mmol/L      CO2 26 mmol/L      ANION GAP 11 mmol/L      BUN 16 mg/dL      Creatinine 0.74 mg/dL      Glucose 108 mg/dL      Calcium 9.5 mg/dL      AST 20 U/L      ALT 20 U/L      Alkaline Phosphatase 47 U/L      Total Protein 7.5 g/dL      Albumin 4.5 g/dL      Total Bilirubin 0.68 mg/dL      eGFR  82 ml/min/1.73sq m     Narrative:      [employer] guidelines for Chronic Kidney Disease (CKD):     Stage 1 with normal or high GFR (GFR > 90 mL/min/1.73 square meters)    Stage 2 Mild CKD (GFR = 60-89 mL/min/1.73 square meters)    Stage 3A Moderate CKD (GFR = 45-59 mL/min/1.73 square meters)    Stage 3B Moderate CKD (GFR = 30-44 mL/min/1.73 square meters)    Stage 4 Severe CKD (GFR = 15-29 mL/min/1.73 square meters)    Stage 5 End Stage CKD (GFR <15 mL/min/1.73 square meters)  Note: GFR calculation is accurate only with a steady state creatinine    CBC and differential [[id number]] Collected: 03/01/25 1254    Lab Status: Final result Specimen: Blood from Arm, Right Updated: 03/01/25 1302     WBC 5.21 Thousand/uL      RBC 4.70 Million/uL      Hemoglobin 14.3 g/dL      Hematocrit 42.8 %      MCV 91 fL      MCH 30.4 pg      MCHC 33.4 g/dL      RDW 12.7 %      MPV 9.4 fL      Platelets 209 Thousands/uL      nRBC 0 /100 WBCs      Segmented % 54 %      Immature Grans % 0 %      Lymphocytes % 37 %      Monocytes % 6 %      Eosinophils Relative 2 %      Basophils Relative 1 %      Absolute Neutrophils 2.82 Thousands/µL      Absolute Immature Grans 0.01 Thousand/uL      Absolute Lymphocytes 1.91 Thousands/µL      Absolute Monocytes 0.32 Thousand/µL      Eosinophils Absolute 0.12 Thousand/µL      Basophils Absolute 0.03 Thousands/µL             XR chest 1 view portable    (Results Pending)       Procedures    ED Medication and Procedure Management   Prior to Admission Medications   Prescriptions Last Dose Informant Patient Reported? Taking?   ALPRAZolam (XANAX) 0.25 mg tablet   No No   Sig: Take 1 tablet (0.25 mg total) by mouth daily at bedtime as needed for anxiety   Ascorbic Acid (vitamin C) 1000 MG tablet  Self Yes No   Sig: Take 1,000 mg by mouth every morning   B Complex-C (B-complex with vitamin C) tablet  Self Yes No   Sig: Take 1 tablet by mouth every morning   BIOTIN PO  Self Yes No   Sig:  Take by mouth   Calcium Carb-Cholecalciferol (CALTRATE 600+D3 PO)  Self Yes No   Sig: Take by mouth every morning     Cholecalciferol (Vitamin D) 50 MCG (2000 UT) CAPS  Self Yes No   Sig: Take by mouth every morning   Glucosamine-Chondroitin-MSM (GLUCOSAMINE CHONDROIT MSM DS PO)  Self Yes No   Sig: Take by mouth every morning 2 tabs   Lactobacillus-Inulin (CULTURELLE DIGESTIVE DAILY PO)  Self Yes No   Sig: Take by mouth every morning   atorvastatin (LIPITOR) 10 mg tablet   No No   Sig: Take 1 tablet (10 mg total) by mouth daily   carvedilol (COREG) 12.5 mg tablet   No No   Sig: Take 1 tablet (12.5 mg total) by mouth 2 (two) times a day with meals   loratadine (CLARITIN) 10 mg tablet  Self Yes No   Sig: Take 10 mg by mouth daily   losartan-hydrochlorothiazide (HYZAAR) 100-12.5 MG per tablet   No No   Sig: Take 1 tablet by mouth daily   multivitamin (THERAGRAN) TABS  Self Yes No   Sig: Take 1 tablet by mouth every morning   timolol (BETIMOL) 0.5 % ophthalmic solution  Self Yes No   Sig: Administer 1 drop to both eyes every morning   zinc gluconate 50 mg tablet  Self Yes No   Sig: Take 50 mg by mouth daily      Facility-Administered Medications: None     Patient's Medications   Discharge Prescriptions    No medications on file     No discharge procedures on file.  ED SEPSIS DOCUMENTATION   Time reflects when diagnosis was documented in both MDM as applicable and the Disposition within this note       Time User Action Codes Description Comment    3/1/2025  1:49 PM Pancho Taylor Add [R07.9] Chest pain                  Pancho Taylor MD  03/01/25 7297

## 2025-03-01 NOTE — DISCHARGE INSTRUCTIONS
We have performed an evaluation of your chest pain in the emergency department and determined that you can be safely discharged home. We have provided you with general information about chest pain in adults.  We have provided a referral to cardiology.  They should call you within the next few days about follow-up to be seen within the next week. If your chest pain changes, worsens, if you have significant associated shortness of breath, palpitations, diaphoresis, persistent nausea and vomiting, or if you pass out, return to the emergency department immediately.

## 2025-03-05 ENCOUNTER — HOSPITAL ENCOUNTER (OUTPATIENT)
Dept: RADIOLOGY | Facility: HOSPITAL | Age: 70
Discharge: HOME | End: 2025-03-05
Payer: COMMERCIAL

## 2025-03-05 ENCOUNTER — OFFICE VISIT (OUTPATIENT)
Dept: FAMILY MEDICINE CLINIC | Facility: CLINIC | Age: 70
End: 2025-03-05
Payer: COMMERCIAL

## 2025-03-05 VITALS
HEIGHT: 64 IN | BODY MASS INDEX: 26.12 KG/M2 | DIASTOLIC BLOOD PRESSURE: 70 MMHG | OXYGEN SATURATION: 97 % | WEIGHT: 153 LBS | SYSTOLIC BLOOD PRESSURE: 120 MMHG | HEART RATE: 79 BPM

## 2025-03-05 DIAGNOSIS — M25.512 LEFT SHOULDER PAIN, UNSPECIFIED CHRONICITY: ICD-10-CM

## 2025-03-05 DIAGNOSIS — I10 ESSENTIAL HYPERTENSION: ICD-10-CM

## 2025-03-05 DIAGNOSIS — M54.50 LOW BACK PAIN, UNSPECIFIED BACK PAIN LATERALITY, UNSPECIFIED CHRONICITY, UNSPECIFIED WHETHER SCIATICA PRESENT: Primary | ICD-10-CM

## 2025-03-05 DIAGNOSIS — H93.8X1 CLOGGED EAR, RIGHT: ICD-10-CM

## 2025-03-05 DIAGNOSIS — R09.81 NASAL CONGESTION: ICD-10-CM

## 2025-03-05 LAB
ATRIAL RATE: 67 BPM
ATRIAL RATE: 73 BPM
P AXIS: 18 DEGREES
P AXIS: 57 DEGREES
PR INTERVAL: 204 MS
PR INTERVAL: 216 MS
QRS AXIS: 1 DEGREES
QRS AXIS: 13 DEGREES
QRSD INTERVAL: 68 MS
QRSD INTERVAL: 70 MS
QT INTERVAL: 378 MS
QT INTERVAL: 384 MS
QTC INTERVAL: 399 MS
QTC INTERVAL: 423 MS
T WAVE AXIS: 20 DEGREES
T WAVE AXIS: 21 DEGREES
VENTRICULAR RATE: 67 BPM
VENTRICULAR RATE: 73 BPM

## 2025-03-05 PROCEDURE — G2211 COMPLEX E/M VISIT ADD ON: HCPCS | Performed by: INTERNAL MEDICINE

## 2025-03-05 PROCEDURE — 93010 ELECTROCARDIOGRAM REPORT: CPT | Performed by: INTERNAL MEDICINE

## 2025-03-05 PROCEDURE — 99214 OFFICE O/P EST MOD 30 MIN: CPT | Performed by: INTERNAL MEDICINE

## 2025-03-05 PROCEDURE — 73030 X-RAY EXAM OF SHOULDER: CPT

## 2025-03-05 RX ORDER — PSEUDOEPHEDRINE HCL 30 MG/1
60 TABLET, FILM COATED ORAL 2 TIMES DAILY PRN
Qty: 30 TABLET | Refills: 0 | Status: SHIPPED | OUTPATIENT
Start: 2025-03-05

## 2025-03-05 NOTE — PROGRESS NOTES
Assessment/Plan:BP initially elevated but better upon retake-continue meds for that; seems like she has some leftover eustachian tube dysfunction right ear after cold symptoms in Dec-recommend flonase, sudafed, claritin and ear pop maneuvers as well as time-I think the left chest wall pain /twinging is more musculoskeletal-I will order a shoulder Xray and advised continuing tylenol as needed and some topical voltaren gel-she actually has an appt with Cardiology on Wed to see if any more definitive testing is necessary like coronary calcium CT or stress test         Problem List Items Addressed This Visit       Essential hypertension     Other Visit Diagnoses         Low back pain, unspecified back pain laterality, unspecified chronicity, unspecified whether sciatica present    -  Primary      Left shoulder pain, unspecified chronicity        Relevant Orders    XR shoulder 2+ vw left      Nasal congestion        Relevant Medications    pseudoephedrine (SUDAFED) 30 mg tablet      Clogged ear, right                  Subjective:      Patient ID: Melony Donovan is a 69 y.o. female.    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 so could be a shoulder issue too    Earache       The following portions of the patient's history were reviewed and updated as appropriate:   Past Medical History:  She has a past medical history of Anxiety, Diverticulitis of colon, Fatty liver (6/17/2021), Glaucoma, H/O cardiovascular stress test (08/16/2021), Hypercholesteremia, Hypertension, and  Osteopenia.,  _______________________________________________________________________  Medical Problems:  does not have any pertinent problems on file.,  _______________________________________________________________________  Past Surgical History:   has a past surgical history that includes Tonsillectomy; Mammo (historical) (12/14/2018,11/10/17); Breast cyst excision (Right); Colonoscopy (2016); Foot surgery (Right); and Tooth extraction.,  _______________________________________________________________________  Family History:  family history includes Bone cancer in her cousin; Breast cancer in her paternal aunt; Early death (age of onset: 38) in her maternal grandfather; Heart disease in her maternal grandfather; Hyperlipidemia in her mother; Hypertension in her mother; Lung cancer (age of onset: 54) in her father; No Known Problems in her brother, brother, maternal aunt, maternal aunt, maternal grandmother, paternal grandfather, and paternal grandmother; Other in her mother; Stroke in her mother.,  _______________________________________________________________________  Social History:   reports that she has never smoked. She has never used smokeless tobacco. She reports current alcohol use. She reports that she does not use drugs.,  _______________________________________________________________________  Allergies:  is allergic to bee venom..  _______________________________________________________________________  Current Outpatient Medications   Medication Sig Dispense Refill    ALPRAZolam (XANAX) 0.25 mg tablet Take 1 tablet (0.25 mg total) by mouth daily at bedtime as needed for anxiety 30 tablet 0    Ascorbic Acid (vitamin C) 1000 MG tablet Take 1,000 mg by mouth every morning      atorvastatin (LIPITOR) 10 mg tablet Take 1 tablet (10 mg total) by mouth daily 90 tablet 0    B Complex-C (B-complex with vitamin C) tablet Take 1 tablet by mouth every morning      BIOTIN PO Take by mouth      Calcium  "Carb-Cholecalciferol (CALTRATE 600+D3 PO) Take by mouth every morning        carvedilol (COREG) 12.5 mg tablet Take 1 tablet (12.5 mg total) by mouth 2 (two) times a day with meals 180 tablet 0    Cholecalciferol (Vitamin D) 50 MCG (2000 UT) CAPS Take by mouth every morning      Glucosamine-Chondroitin-MSM (GLUCOSAMINE CHONDROIT MSM DS PO) Take by mouth every morning 2 tabs      Lactobacillus-Inulin (CULTURELLE DIGESTIVE DAILY PO) Take by mouth every morning      loratadine (CLARITIN) 10 mg tablet Take 10 mg by mouth daily      losartan-hydrochlorothiazide (HYZAAR) 100-12.5 MG per tablet Take 1 tablet by mouth daily 90 tablet 1    multivitamin (THERAGRAN) TABS Take 1 tablet by mouth every morning      pseudoephedrine (SUDAFED) 30 mg tablet Take 2 tablets (60 mg total) by mouth 2 (two) times a day as needed for congestion 30 tablet 0    timolol (BETIMOL) 0.5 % ophthalmic solution Administer 1 drop to both eyes every morning      zinc gluconate 50 mg tablet Take 50 mg by mouth daily       No current facility-administered medications for this visit.     _______________________________________________________________________  Review of Systems   Constitutional:  Positive for fever. Negative for fatigue.   HENT:  Positive for ear pain.    Respiratory:  Negative for shortness of breath.    Cardiovascular:  Positive for chest pain.   Musculoskeletal:  Positive for myalgias.         Objective:  Vitals:    03/05/25 1044 03/05/25 1111   BP: 158/84 120/70   BP Location: Left arm    Patient Position: Sitting    Cuff Size: Standard    Pulse: 79    SpO2: 97%    Weight: 69.4 kg (153 lb)    Height: 5' 4\" (1.626 m)      Body mass index is 26.26 kg/m².     Physical Exam  Constitutional:       Appearance: Normal appearance.   HENT:      Head: Normocephalic and atraumatic.      Right Ear: Tympanic membrane, ear canal and external ear normal.      Left Ear: Tympanic membrane, ear canal and external ear normal.      Nose: Nose normal.    "   Mouth/Throat:      Mouth: Mucous membranes are moist.   Eyes:      Extraocular Movements: Extraocular movements intact.      Pupils: Pupils are equal, round, and reactive to light.   Cardiovascular:      Rate and Rhythm: Normal rate and regular rhythm.      Heart sounds: Normal heart sounds.   Pulmonary:      Effort: Pulmonary effort is normal.      Breath sounds: Normal breath sounds.   Musculoskeletal:         General: No tenderness. Normal range of motion.      Cervical back: Normal range of motion.   Skin:     General: Skin is warm.   Neurological:      General: No focal deficit present.      Mental Status: She is alert and oriented to person, place, and time.   Psychiatric:         Mood and Affect: Mood normal.         Thought Content: Thought content normal.         Judgment: Judgment normal.

## 2025-03-06 ENCOUNTER — RESULTS FOLLOW-UP (OUTPATIENT)
Dept: FAMILY MEDICINE CLINIC | Facility: CLINIC | Age: 70
End: 2025-03-06

## 2025-03-12 ENCOUNTER — OFFICE VISIT (OUTPATIENT)
Dept: CARDIOLOGY CLINIC | Facility: CLINIC | Age: 70
End: 2025-03-12
Payer: COMMERCIAL

## 2025-03-12 VITALS
OXYGEN SATURATION: 97 % | WEIGHT: 154 LBS | HEART RATE: 70 BPM | SYSTOLIC BLOOD PRESSURE: 132 MMHG | DIASTOLIC BLOOD PRESSURE: 80 MMHG | HEIGHT: 64 IN | BODY MASS INDEX: 26.29 KG/M2

## 2025-03-12 DIAGNOSIS — R07.89 OTHER CHEST PAIN: Primary | ICD-10-CM

## 2025-03-12 DIAGNOSIS — I10 ESSENTIAL HYPERTENSION: ICD-10-CM

## 2025-03-12 DIAGNOSIS — E78.5 DYSLIPIDEMIA: ICD-10-CM

## 2025-03-12 DIAGNOSIS — F41.1 GENERALIZED ANXIETY DISORDER: ICD-10-CM

## 2025-03-12 DIAGNOSIS — I35.1 NONRHEUMATIC AORTIC VALVE INSUFFICIENCY: ICD-10-CM

## 2025-03-12 DIAGNOSIS — I34.0 NONRHEUMATIC MITRAL VALVE REGURGITATION: ICD-10-CM

## 2025-03-12 PROCEDURE — 99214 OFFICE O/P EST MOD 30 MIN: CPT

## 2025-03-12 NOTE — ASSESSMENT & PLAN NOTE
- BP controlled  - Currently on Coreg 12.5 mg twice daily and losartan-hydrochlorothiazide 100-12.5 mg daily.  Continue current medication regimen.

## 2025-03-12 NOTE — ASSESSMENT & PLAN NOTE
- Continue Lipitor 10 mg daily.  - 6/02/23 lipid panel: Cholesterol 164, triglycerides 63, HDL 87, LDL 64.

## 2025-03-12 NOTE — PROGRESS NOTES
[facility]  General Cardiology Note  Melony Donovan  1955  [id number]      Referring Provider - No ref. provider found  Chief Complaint   Patient presents with    Follow-up     Left sided pinching in the chest. All started when she had a Mammogram.       Assessment & Plan  Other chest pain  Twinge over left side of the chest, closer to the axillary area. Very sporadic. No other associated symptoms. Also c/o of occasional left shoulder pain. Recent shoulder xray negative. She is f/u with PCP  -  8/1/24 echo normal lvef. mild valvular abnornalities  - 8/2021 treadmill stress test negative  Chest discomfort less likely cardiac origin. More likely pinched nerve or musculoskeletal  We will chest a treadmill stress test for reassurance  Essential hypertension  - BP controlled  - Currently on Coreg 12.5 mg twice daily and losartan-hydrochlorothiazide 100-12.5 mg daily.  Continue current medication regimen.    Nonrheumatic aortic valve insufficiency  Recent echo There is mild regurgitation. There is aortic valve sclerosis.   Nonrheumatic mitral valve regurgitation  Recent echo There is mild annular calcification. There is mild regurgitation.   Generalized anxiety disorder  Care per PCP.   Dyslipidemia  - Continue Lipitor 10 mg daily.  - 6/02/23 lipid panel: Cholesterol 164, triglycerides 63, HDL 87, LDL 64.    Will RTO in 6 months or sooner if necessary  Patient / Caretaker was advised and educated to call our office  immediately if  patient has any new symptoms of chest pain/shortness of breath, near-syncope, syncope, light headedness sustained palpitations  or any other cardiovascular symptoms before their scheduled follow-up appointment.  ED precautions reviewed    Interval History: 69 y.o.  female  with PMH as below is here for HFU  Patient of Dr. Echavarria  Seen in the ED on 3/1/25 with c/o intermittent spasms to her left upper chest over the past few weeks. No other associated symptoms.  Cardiac work up negative.    Patient states that the pinching sensation started about 3 years ago after a mammogram. she works in [employer] and does some heavy lifting at times.    She saw her PCP who ordered left shoulder xray, which came back negative  - 24 echo normal lvef. mild valvular abnornalities  - 2021 treadmill stress test negative  - family history maternal grandfather  at 38 from heart attack    Patient states she is worried that she is having a heart attack and would like further testing    Patient Active Problem List    Diagnosis Date Noted    Aortic valve sclerosis 01/15/2025    Aortic insufficiency 2025    Nonrheumatic mitral valve regurgitation 2025    Arthritis of first metatarsophalangeal (MTP) joint of right foot 2024    Tenderness of neck 10/24/2023    Vitamin D deficiency 2022    Generalized anxiety disorder 2021    Family history of myocardial infarction 2021    Fatty liver 2021    Diverticulosis 2021    Dyslipidemia 2020    Osteopenia of multiple sites 2020    Plantar fasciitis, bilateral 2020    Essential hypertension 2020    Other specified glaucoma 2020     Past Medical History:   Diagnosis Date    Anxiety     Diverticulitis of colon     Fatty liver 2021    Glaucoma     H/O cardiovascular stress test 2021    Dr. Echavarria    Hypercholesteremia     Hypertension     Osteopenia      Social History     Tobacco Use    Smoking status: Never    Smokeless tobacco: Never   Vaping Use    Vaping status: Never Used   Substance Use Topics    Alcohol use: Yes    Drug use: Never      Family History   Problem Relation Age of Onset    Hyperlipidemia Mother     Hypertension Mother     Other Mother         cerebrovascular accident    Stroke Mother     Lung cancer Father 54    No Known Problems Maternal Grandmother     Heart disease Maternal Grandfather     Early death Maternal Grandfather 38        massive  MI-congenital defect?    No Known Problems Paternal Grandmother     No Known Problems Paternal Grandfather     No Known Problems Brother     No Known Problems Brother     No Known Problems Maternal Aunt     No Known Problems Maternal Aunt     Breast cancer Paternal Aunt         later in life    Bone cancer Cousin     ADD / ADHD Neg Hx     Anesthesia problems Neg Hx     Cancer Neg Hx     Clotting disorder Neg Hx     Collagen disease Neg Hx     Diabetes Neg Hx     Dislocations Neg Hx     Learning disabilities Neg Hx     Neurological problems Neg Hx     Osteoporosis Neg Hx     Rheumatologic disease Neg Hx     Scoliosis Neg Hx     Vascular Disease Neg Hx      Past Surgical History:   Procedure Laterality Date    BREAST CYST EXCISION Right     benign - 2000 approx.    COLONOSCOPY  2016    FOOT SURGERY Right     removal of cat tooth removed    MAMMO (HISTORICAL)  12/14/2018,11/10/17    TONSILLECTOMY      TOOTH EXTRACTION         Current Outpatient Medications:     ALPRAZolam (XANAX) 0.25 mg tablet, Take 1 tablet (0.25 mg total) by mouth daily at bedtime as needed for anxiety, Disp: 30 tablet, Rfl: 0    Ascorbic Acid (vitamin C) 1000 MG tablet, Take 1,000 mg by mouth every morning, Disp: , Rfl:     atorvastatin (LIPITOR) 10 mg tablet, Take 1 tablet (10 mg total) by mouth daily, Disp: 90 tablet, Rfl: 0    B Complex-C (B-complex with vitamin C) tablet, Take 1 tablet by mouth every morning, Disp: , Rfl:     BIOTIN PO, Take by mouth, Disp: , Rfl:     Calcium Carb-Cholecalciferol (CALTRATE 600+D3 PO), Take by mouth every morning  , Disp: , Rfl:     carvedilol (COREG) 12.5 mg tablet, Take 1 tablet (12.5 mg total) by mouth 2 (two) times a day with meals, Disp: 180 tablet, Rfl: 0    Cholecalciferol (Vitamin D) 50 MCG (2000 UT) CAPS, Take by mouth every morning, Disp: , Rfl:     Glucosamine-Chondroitin-MSM (GLUCOSAMINE CHONDROIT MSM DS PO), Take by mouth every morning 2 tabs, Disp: , Rfl:     Lactobacillus-Inulin (CULTURELLE  "DIGESTIVE DAILY PO), Take by mouth every morning, Disp: , Rfl:     loratadine (CLARITIN) 10 mg tablet, Take 10 mg by mouth daily, Disp: , Rfl:     losartan-hydrochlorothiazide (HYZAAR) 100-12.5 MG per tablet, Take 1 tablet by mouth daily, Disp: 90 tablet, Rfl: 1    multivitamin (THERAGRAN) TABS, Take 1 tablet by mouth every morning, Disp: , Rfl:     pseudoephedrine (SUDAFED) 30 mg tablet, Take 2 tablets (60 mg total) by mouth 2 (two) times a day as needed for congestion, Disp: 30 tablet, Rfl: 0    timolol (BETIMOL) 0.5 % ophthalmic solution, Administer 1 drop to both eyes every morning, Disp: , Rfl:     zinc gluconate 50 mg tablet, Take 50 mg by mouth daily, Disp: , Rfl:     Allergies   Allergen Reactions    Bee Venom Swelling       Vitals:    03/12/25 0904   BP: 132/80   BP Location: Right arm   Patient Position: Sitting   Cuff Size: Standard   Pulse: 70   SpO2: 97%   Weight: 69.9 kg (154 lb)   Height: 5' 4\" (1.626 m)        Vitals:    03/12/25 0904   Weight: 69.9 kg (154 lb)      Height: 5' 4\" (162.6 cm)   Body mass index is 26.43 kg/m².    Labs:   Lab Results   Component Value Date/Time    CHOLESTEROL 168 06/02/2023 08:44 AM    CHOLESTEROL 164 06/02/2023 08:44 AM    TRIG 63 06/02/2023 08:44 AM    TRIG 48 10/30/2020 10:54 AM    HDL 84 06/02/2023 08:44 AM    HDL 87 06/02/2023 08:44 AM    HDL 72 10/30/2020 10:54 AM    LDLCALC 70 06/02/2023 08:44 AM    LDLCALC 64 06/02/2023 08:44 AM    LDLCALC 73 10/30/2020 10:54 AM      Lab Results   Component Value Date    SODIUM 136 03/01/2025    K 3.9 03/01/2025    CL 99 03/01/2025    CREATININE 0.74 03/01/2025    EGFR 82 03/01/2025    BUN 16 03/01/2025    CO2 26 03/01/2025    ALT 20 03/01/2025    AST 20 03/01/2025    INR 1.01 02/21/2021    GLUF 117 (H) 06/02/2023    HGBA1C 4.7 07/06/2023    WBC 5.21 03/01/2025    HGB 14.3 03/01/2025    HCT 42.8 03/01/2025     03/01/2025         Imaging: XR shoulder 2+ vw left  Result Date: 3/6/2025  Narrative: XR SHOULDER 2+ VW LEFT " INDICATION: M25.512: Pain in left shoulder. COMPARISON: None FINDINGS: No acute fracture or dislocation. No significant degenerative changes. No lytic or blastic osseous lesion. Unremarkable soft tissues.     Impression: No acute osseous abnormality. Workstation performed: [id number]     XR chest 1 view portable  Result Date: 3/1/2025  Narrative: XR CHEST PORTABLE INDICATION: left sided chest pain. COMPARISON: 2/21/2021 FINDINGS: Clear lungs. No pneumothorax or pleural effusion. Normal cardiomediastinal silhouette. Bones are unremarkable for age. Normal upper abdomen.     Impression: No acute cardiopulmonary disease. Workstation performed: [id number]       ECG:     Reviewed by ILENE Burnett      Review of Systems   Cardiovascular:         Chest discomfort   All other systems reviewed and are negative.    Except as noted in HPI, is otherwise reviewed in detail and a 12 point review of systems is negative.    Physical Exam  Vitals reviewed.   Constitutional:       General: She is not in acute distress.     Appearance: Normal appearance. She is not diaphoretic.   HENT:      Head: Normocephalic and atraumatic.   Eyes:      General: No scleral icterus.  Cardiovascular:      Rate and Rhythm: Normal rate and regular rhythm.      Pulses: Normal pulses.           Radial pulses are 2+ on the right side and 2+ on the left side.      Heart sounds: Normal heart sounds, S1 normal and S2 normal.   Pulmonary:      Effort: Pulmonary effort is normal. No tachypnea or respiratory distress.      Breath sounds: Normal breath sounds. No wheezing or rales.   Abdominal:      General: Bowel sounds are normal. There is no distension.      Palpations: Abdomen is soft.   Musculoskeletal:      Right lower leg: No edema.      Left lower leg: No edema.   Skin:     General: Skin is warm and dry.      Capillary Refill: Capillary refill takes less than 2 seconds.   Neurological:      Mental Status: She is alert and oriented to person, place,  and time.   Psychiatric:         Mood and Affect: Mood normal.         Behavior: Behavior normal.          **Please Note: This note may have been constructed using a voice recognition system**     Thank you for the opportunity to participate in the care of this patient.   ILENE Burnett

## 2025-04-17 DIAGNOSIS — I10 ESSENTIAL HYPERTENSION: ICD-10-CM

## 2025-04-17 RX ORDER — LOSARTAN POTASSIUM AND HYDROCHLOROTHIAZIDE 12.5; 1 MG/1; MG/1
1 TABLET ORAL DAILY
Qty: 100 TABLET | Refills: 1 | Status: SHIPPED | OUTPATIENT
Start: 2025-04-17

## 2025-05-16 NOTE — TELEPHONE ENCOUNTER
----- Message from Iesha Duncan MD sent at 5/16/2024  2:59 PM EDT -----  Osteopenia vit d 2000 units qd and calcium rich foods and weight bearing exercise  
Left message advising pt to call office back  
Patient advised  
Patient returned call, transferred to clinical at practice.  
Would a calcium supplement help.  Did osteoporosis get worse  
Yes slightly worse still not osteoporosis but osteopenia vit d 2000 units calcium rich foods and can take calcium pills if wishes  
tc  
oral

## 2025-05-25 DIAGNOSIS — I10 ESSENTIAL HYPERTENSION: ICD-10-CM

## 2025-05-26 RX ORDER — CARVEDILOL 12.5 MG/1
12.5 TABLET ORAL 2 TIMES DAILY WITH MEALS
Qty: 180 TABLET | Refills: 1 | Status: SHIPPED | OUTPATIENT
Start: 2025-05-26

## 2025-05-29 ENCOUNTER — TELEPHONE (OUTPATIENT)
Age: 70
End: 2025-05-29

## 2025-05-29 NOTE — TELEPHONE ENCOUNTER
Pt called stating she needs a new mammogram order for this year.   She is scheduled in a couple of weeks to have it done.  Please advise

## 2025-06-06 ENCOUNTER — OFFICE VISIT (OUTPATIENT)
Dept: URGENT CARE | Facility: CLINIC | Age: 70
End: 2025-06-06
Payer: COMMERCIAL

## 2025-06-06 VITALS
HEART RATE: 70 BPM | TEMPERATURE: 96.9 F | RESPIRATION RATE: 18 BRPM | SYSTOLIC BLOOD PRESSURE: 130 MMHG | DIASTOLIC BLOOD PRESSURE: 80 MMHG | WEIGHT: 152 LBS | HEIGHT: 65 IN | BODY MASS INDEX: 25.33 KG/M2 | OXYGEN SATURATION: 97 %

## 2025-06-06 DIAGNOSIS — S86.912A KNEE STRAIN, LEFT, INITIAL ENCOUNTER: Primary | ICD-10-CM

## 2025-06-06 PROCEDURE — 99213 OFFICE O/P EST LOW 20 MIN: CPT | Performed by: PREVENTIVE MEDICINE

## 2025-06-06 NOTE — PROGRESS NOTES
"  [facility]        NAME: Melony Donovan is a 69 y.o. female  : 1955    MRN: [id number]  DATE: 2025  TIME: 12:09 PM    Assessment and Plan   Knee strain, left, initial encounter [S86.431E]  1. Knee strain, left, initial encounter              Patient Instructions       Follow up with PCP in 3-5 days.  Proceed to  ER if symptoms worsen.    If tests have been performed at [facility], our office will contact you with results if changes need to be made to the care plan discussed with you at the visit.  You can review your full results on [facility].    Chief Complaint     Chief Complaint   Patient presents with    Knee Pain     Awoke today with pain behind L knee. Some pain with ambulation. No swelling. No trauma/injury. Was scrubbing floor several days ago.Used heat and Tylenol XS at 5:30 am.          History of Present Illness       She was scrubbing floors a day ago.  Today woke up with some pain behind her left knee.    Knee Pain         Review of Systems   Review of Systems   Musculoskeletal:  Positive for gait problem and myalgias.         Current Medications     Current Medications[1]    Current Allergies     Allergies as of 2025 - Reviewed 2025   Allergen Reaction Noted    Bee venom Swelling 2021            The following portions of the patient's history were reviewed and updated as appropriate: allergies, current medications, past family history, past medical history, past social history, past surgical history and problem list.     Past Medical History[2]    Past Surgical History[3]    Family History[4]      Medications have been verified.        Objective   /80   Pulse 70   Temp (!) 96.9 °F (36.1 °C)   Resp 18   Ht 5' 4.5\" (1.638 m)   Wt 68.9 kg (152 lb)   SpO2 97%   BMI 25.69 kg/m²   No LMP recorded. Patient is postmenopausal.       Physical Exam     Physical Exam    Musculoskeletal:      Comments: The left knee is not warm red or swollen.  " The medial and lateral menisci are intact.  Medial lateral collateral ligament intact.  ACL is intact.  Mild tenderness to palpation where the calf muscle inserts behind the knee.                        [1]   Current Outpatient Medications:     ALPRAZolam (XANAX) 0.25 mg tablet, Take 1 tablet (0.25 mg total) by mouth daily at bedtime as needed for anxiety, Disp: 30 tablet, Rfl: 0    Ascorbic Acid (vitamin C) 1000 MG tablet, Take 1,000 mg by mouth every morning, Disp: , Rfl:     atorvastatin (LIPITOR) 10 mg tablet, Take 1 tablet (10 mg total) by mouth daily, Disp: 90 tablet, Rfl: 0    B Complex-C (B-complex with vitamin C) tablet, Take 1 tablet by mouth every morning, Disp: , Rfl:     BIOTIN PO, Take by mouth, Disp: , Rfl:     Calcium Carb-Cholecalciferol (CALTRATE 600+D3 PO), Take by mouth every morning, Disp: , Rfl:     carvedilol (COREG) 12.5 mg tablet, TAKE 1 TABLET BY MOUTH TWICE  DAILY WITH MEALS, Disp: 180 tablet, Rfl: 1    Cholecalciferol (Vitamin D) 50 MCG (2000 UT) CAPS, Take by mouth every morning, Disp: , Rfl:     Glucosamine-Chondroitin-MSM (GLUCOSAMINE CHONDROIT MSM DS PO), Take by mouth every morning 2 tabs, Disp: , Rfl:     loratadine (CLARITIN) 10 mg tablet, Take 10 mg by mouth in the morning., Disp: , Rfl:     losartan-hydrochlorothiazide (HYZAAR) 100-12.5 MG per tablet, TAKE 1 TABLET BY MOUTH DAILY, Disp: 100 tablet, Rfl: 1    multivitamin (THERAGRAN) TABS, Take 1 tablet by mouth every morning, Disp: , Rfl:     timolol (BETIMOL) 0.5 % ophthalmic solution, Administer 1 drop to both eyes every morning, Disp: , Rfl:     zinc gluconate 50 mg tablet, Take 50 mg by mouth in the morning., Disp: , Rfl:   [2]   Past Medical History:  Diagnosis Date    Anxiety     Diverticulitis of colon     Fatty liver 6/17/2021    Glaucoma     H/O cardiovascular stress test 08/16/2021    Dr. Echavarria    Hypercholesteremia     Hypertension     Osteopenia    [3]   Past Surgical History:  Procedure Laterality Date    BREAST  CYST EXCISION Right     benign - 2000 approx.    COLONOSCOPY  2016    FOOT SURGERY Right     removal of cat tooth removed    MAMMO (HISTORICAL)  12/14/2018,11/10/17    TONSILLECTOMY      TOOTH EXTRACTION     [4]   Family History  Problem Relation Name Age of Onset    Hyperlipidemia Mother      Hypertension Mother      Other Mother          cerebrovascular accident    Stroke Mother      Lung cancer Father  54    No Known Problems Maternal Grandmother      Heart disease Maternal Grandfather      Early death Maternal Grandfather  38        massive MI-congenital defect?    No Known Problems Paternal Grandmother      No Known Problems Paternal Grandfather      No Known Problems Brother      No Known Problems Brother      No Known Problems Maternal Aunt      No Known Problems Maternal Aunt      Breast cancer Paternal Aunt          later in life    Bone cancer Cousin maternal     ADD / ADHD Neg Hx      Anesthesia problems Neg Hx      Cancer Neg Hx      Clotting disorder Neg Hx      Collagen disease Neg Hx      Diabetes Neg Hx      Dislocations Neg Hx      Learning disabilities Neg Hx      Neurological problems Neg Hx      Osteoporosis Neg Hx      Rheumatologic disease Neg Hx      Scoliosis Neg Hx      Vascular Disease Neg Hx

## 2025-06-06 NOTE — PATIENT INSTRUCTIONS
I think you have a calf muscle strain.  Area for 5 times a day.  Ibuprofen 2 tablets 4 times a day.  Use Voltaren cream 2 or 3 times a day.  If no improvement in 7 to 10 days consider orthopedic opinion

## 2025-06-07 ENCOUNTER — APPOINTMENT (OUTPATIENT)
Dept: RADIOLOGY | Facility: HOSPITAL | Age: 70
End: 2025-06-07
Payer: COMMERCIAL

## 2025-06-07 ENCOUNTER — HOSPITAL ENCOUNTER (OUTPATIENT)
Dept: RADIOLOGY | Facility: HOSPITAL | Age: 70
Discharge: HOME | End: 2025-06-07
Attending: FAMILY MEDICINE
Payer: COMMERCIAL

## 2025-06-07 DIAGNOSIS — Z12.31 SCREENING MAMMOGRAM, ENCOUNTER FOR: ICD-10-CM

## 2025-06-07 PROCEDURE — 77067 SCR MAMMO BI INCL CAD: CPT

## 2025-06-07 PROCEDURE — 77063 BREAST TOMOSYNTHESIS BI: CPT

## 2025-06-16 DIAGNOSIS — E78.5 HYPERLIPIDEMIA, UNSPECIFIED HYPERLIPIDEMIA TYPE: ICD-10-CM

## 2025-06-16 RX ORDER — ATORVASTATIN CALCIUM 10 MG/1
10 TABLET, FILM COATED ORAL DAILY
Qty: 90 TABLET | Refills: 0 | Status: SHIPPED | OUTPATIENT
Start: 2025-06-16

## 2025-07-21 ENCOUNTER — TELEPHONE (OUTPATIENT)
Age: 70
End: 2025-07-21

## 2025-07-21 NOTE — TELEPHONE ENCOUNTER
Caller: Melony Donovan    Doctor: Dr. Echavarria    Reason for call: She has  a stress test tomorrow and would like to know if she needs to hold any mediation.  Please call her    Thank you    Call back#: [phone]

## 2025-07-22 ENCOUNTER — HOSPITAL ENCOUNTER (OUTPATIENT)
Dept: NON INVASIVE DIAGNOSTICS | Facility: HOSPITAL | Age: 70
Discharge: HOME | End: 2025-07-22
Payer: COMMERCIAL

## 2025-07-22 VITALS
DIASTOLIC BLOOD PRESSURE: 70 MMHG | RESPIRATION RATE: 20 BRPM | HEART RATE: 77 BPM | SYSTOLIC BLOOD PRESSURE: 124 MMHG | OXYGEN SATURATION: 97 %

## 2025-07-22 DIAGNOSIS — I10 ESSENTIAL HYPERTENSION: ICD-10-CM

## 2025-07-22 DIAGNOSIS — R07.89 OTHER CHEST PAIN: ICD-10-CM

## 2025-07-22 LAB
CHEST PAIN STATEMENT: NORMAL
MAX DIASTOLIC BP: 80 MMHG
MAX PREDICTED HEART RATE: 151 BPM
PROTOCOL NAME: NORMAL
REASON FOR TERMINATION: NORMAL
STRESS POST EXERCISE DUR MIN: 7 MIN
STRESS POST EXERCISE DUR SEC: 28 SEC
STRESS POST PEAK HR: 129 BPM
STRESS POST PEAK SYSTOLIC BP: 160 MMHG
TARGET HR FORMULA: NORMAL
TEST INDICATION: NORMAL

## 2025-07-22 PROCEDURE — 93016 CV STRESS TEST SUPVJ ONLY: CPT | Performed by: INTERNAL MEDICINE

## 2025-07-22 PROCEDURE — 93018 CV STRESS TEST I&R ONLY: CPT | Performed by: INTERNAL MEDICINE

## 2025-07-22 PROCEDURE — 93017 CV STRESS TEST TRACING ONLY: CPT

## 2025-07-23 LAB
MAX HR PERCENT: 85 %
MAX HR: 129 BPM
RATE PRESSURE PRODUCT: NORMAL
SL CV STRESS RECOVERY BP: NORMAL MMHG
SL CV STRESS RECOVERY HR: 96 BPM
SL CV STRESS RECOVERY O2 SAT: 96 %
SL CV STRESS STAGE REACHED: 3
STRESS ANGINA INDEX: 0
STRESS BASELINE BP: NORMAL MMHG
STRESS BASELINE HR: 77 BPM
STRESS O2 SAT REST: 97 %
STRESS PEAK HR: 129 BPM
STRESS POST ESTIMATED WORKLOAD: 10.1 METS
STRESS POST EXERCISE DUR MIN: 7 MIN
STRESS POST EXERCISE DUR SEC: 28 SEC
STRESS POST O2 SAT PEAK: 96 %
STRESS POST PEAK BP: 160 MMHG

## 2025-07-25 ENCOUNTER — APPOINTMENT (OUTPATIENT)
Dept: LAB | Facility: CLINIC | Age: 70
End: 2025-07-25
Attending: FAMILY MEDICINE
Payer: COMMERCIAL

## 2025-07-25 DIAGNOSIS — I10 ESSENTIAL HYPERTENSION: ICD-10-CM

## 2025-07-25 DIAGNOSIS — E78.5 DYSLIPIDEMIA: ICD-10-CM

## 2025-07-25 LAB
ALBUMIN SERPL BCG-MCNC: 4.2 G/DL (ref 3.5–5)
ALP SERPL-CCNC: 45 U/L (ref 34–104)
ALT SERPL W P-5'-P-CCNC: 26 U/L (ref 7–52)
ANION GAP SERPL CALCULATED.3IONS-SCNC: 4 MMOL/L (ref 4–13)
AST SERPL W P-5'-P-CCNC: 20 U/L (ref 13–39)
BILIRUB SERPL-MCNC: 0.51 MG/DL (ref 0.2–1)
BUN SERPL-MCNC: 20 MG/DL (ref 5–25)
CALCIUM SERPL-MCNC: 9.1 MG/DL (ref 8.4–10.2)
CHLORIDE SERPL-SCNC: 101 MMOL/L (ref 96–108)
CHOLEST SERPL-MCNC: 161 MG/DL (ref ?–200)
CO2 SERPL-SCNC: 34 MMOL/L (ref 21–32)
CREAT SERPL-MCNC: 0.7 MG/DL (ref 0.6–1.3)
GFR SERPL CREATININE-BSD FRML MDRD: 88 ML/MIN/1.73SQ M
GLUCOSE P FAST SERPL-MCNC: 106 MG/DL (ref 65–99)
HDLC SERPL-MCNC: 59 MG/DL
LDLC SERPL CALC-MCNC: 89 MG/DL (ref 0–100)
NONHDLC SERPL-MCNC: 102 MG/DL
POTASSIUM SERPL-SCNC: 4.2 MMOL/L (ref 3.5–5.3)
PROT SERPL-MCNC: 7 G/DL (ref 6.4–8.4)
SODIUM SERPL-SCNC: 139 MMOL/L (ref 135–147)
TRIGL SERPL-MCNC: 67 MG/DL (ref ?–150)

## 2025-07-25 PROCEDURE — 80061 LIPID PANEL: CPT

## 2025-07-25 PROCEDURE — 36415 COLL VENOUS BLD VENIPUNCTURE: CPT

## 2025-07-25 PROCEDURE — 80053 COMPREHEN METABOLIC PANEL: CPT

## 2025-07-29 ENCOUNTER — OFFICE VISIT (OUTPATIENT)
Dept: FAMILY MEDICINE CLINIC | Facility: CLINIC | Age: 70
End: 2025-07-29
Payer: COMMERCIAL

## 2025-07-29 VITALS
OXYGEN SATURATION: 98 % | RESPIRATION RATE: 16 BRPM | SYSTOLIC BLOOD PRESSURE: 122 MMHG | BODY MASS INDEX: 25.66 KG/M2 | HEART RATE: 80 BPM | WEIGHT: 154 LBS | HEIGHT: 65 IN | DIASTOLIC BLOOD PRESSURE: 78 MMHG | TEMPERATURE: 97 F

## 2025-07-29 DIAGNOSIS — I35.1 NONRHEUMATIC AORTIC VALVE INSUFFICIENCY: ICD-10-CM

## 2025-07-29 DIAGNOSIS — E78.5 DYSLIPIDEMIA: Primary | ICD-10-CM

## 2025-07-29 DIAGNOSIS — Z00.00 MEDICARE ANNUAL WELLNESS VISIT, SUBSEQUENT: ICD-10-CM

## 2025-07-29 DIAGNOSIS — K76.0 FATTY LIVER: ICD-10-CM

## 2025-07-29 DIAGNOSIS — F41.1 ANXIETY STATE: ICD-10-CM

## 2025-07-29 DIAGNOSIS — E55.9 VITAMIN D DEFICIENCY: ICD-10-CM

## 2025-07-29 DIAGNOSIS — F41.1 GENERALIZED ANXIETY DISORDER: ICD-10-CM

## 2025-07-29 DIAGNOSIS — I10 ESSENTIAL HYPERTENSION: ICD-10-CM

## 2025-07-29 DIAGNOSIS — M85.89 OSTEOPENIA OF MULTIPLE SITES: ICD-10-CM

## 2025-07-29 PROCEDURE — G2211 COMPLEX E/M VISIT ADD ON: HCPCS | Performed by: FAMILY MEDICINE

## 2025-07-29 PROCEDURE — G0439 PPPS, SUBSEQ VISIT: HCPCS | Performed by: FAMILY MEDICINE

## 2025-07-29 PROCEDURE — 99214 OFFICE O/P EST MOD 30 MIN: CPT | Performed by: FAMILY MEDICINE

## 2025-07-29 RX ORDER — ALPRAZOLAM 0.25 MG
0.25 TABLET ORAL
Qty: 30 TABLET | Refills: 0 | Status: SHIPPED | OUTPATIENT
Start: 2025-07-29

## 2025-08-13 ENCOUNTER — TELEPHONE (OUTPATIENT)
Age: 70
End: 2025-08-13

## 2025-08-15 ENCOUNTER — OFFICE VISIT (OUTPATIENT)
Dept: FAMILY MEDICINE CLINIC | Facility: CLINIC | Age: 70
End: 2025-08-15
Payer: COMMERCIAL

## 2025-08-19 DIAGNOSIS — E78.5 HYPERLIPIDEMIA, UNSPECIFIED HYPERLIPIDEMIA TYPE: ICD-10-CM

## 2025-08-20 RX ORDER — ATORVASTATIN CALCIUM 10 MG/1
10 TABLET, FILM COATED ORAL DAILY
Qty: 90 TABLET | Refills: 1 | Status: SHIPPED | OUTPATIENT
Start: 2025-08-20

## 2025-08-23 PROBLEM — Z00.00 MEDICARE ANNUAL WELLNESS VISIT, SUBSEQUENT: Status: RESOLVED | Noted: 2020-08-13 | Resolved: 2025-08-23
